# Patient Record
Sex: FEMALE | Race: WHITE | Employment: FULL TIME | URBAN - METROPOLITAN AREA
[De-identification: names, ages, dates, MRNs, and addresses within clinical notes are randomized per-mention and may not be internally consistent; named-entity substitution may affect disease eponyms.]

---

## 2017-01-10 ENCOUNTER — TELEPHONE (OUTPATIENT)
Dept: OBGYN CLINIC | Age: 42
End: 2017-01-10

## 2017-01-10 NOTE — TELEPHONE ENCOUNTER
Patient notified and verbalized understanding    Patient is interested in the LEEP and IUD procedure. She was under the impression it was outpatient. I advised we normally do it in the office. Pt voiced understanding. Will call back once she is bale to schedule.

## 2017-01-13 NOTE — TELEPHONE ENCOUNTER
Patient called back and was advised that LEEP and IUD insertion need to be done in the OR. Patient states she has to check her schedule and will contact the surgery scheduler Ronald Nunez to schedule the surgery.  Patient verbalized understanding

## 2017-01-30 DIAGNOSIS — N93.9 ABNORMAL UTERINE BLEEDING: ICD-10-CM

## 2017-01-30 DIAGNOSIS — N87.1 CERVICAL INTRAEPITHELIAL NEOPLASIA II: Primary | ICD-10-CM

## 2017-02-16 ENCOUNTER — OFFICE VISIT (OUTPATIENT)
Dept: OBGYN CLINIC | Age: 42
End: 2017-02-16

## 2017-02-16 ENCOUNTER — HOSPITAL ENCOUNTER (OUTPATIENT)
Dept: PREADMISSION TESTING | Age: 42
Discharge: HOME | End: 2017-02-16
Payer: COMMERCIAL

## 2017-02-16 VITALS
HEART RATE: 81 BPM | OXYGEN SATURATION: 96 % | BODY MASS INDEX: 43.4 KG/M2 | TEMPERATURE: 97.9 F | WEIGHT: 293 LBS | HEIGHT: 69 IN | RESPIRATION RATE: 18 BRPM | SYSTOLIC BLOOD PRESSURE: 120 MMHG | DIASTOLIC BLOOD PRESSURE: 57 MMHG

## 2017-02-16 VITALS
DIASTOLIC BLOOD PRESSURE: 62 MMHG | SYSTOLIC BLOOD PRESSURE: 120 MMHG | HEIGHT: 69 IN | WEIGHT: 293 LBS | BODY MASS INDEX: 43.4 KG/M2

## 2017-02-16 DIAGNOSIS — N93.9 ABNORMAL UTERINE BLEEDING: ICD-10-CM

## 2017-02-16 DIAGNOSIS — N87.1 CERVICAL INTRAEPITHELIAL NEOPLASIA II: ICD-10-CM

## 2017-02-16 DIAGNOSIS — N93.9 ABNORMAL UTERINE BLEEDING (AUB): ICD-10-CM

## 2017-02-16 DIAGNOSIS — D06.9 SEVERE DYSPLASIA OF CERVIX (CIN III): Primary | ICD-10-CM

## 2017-02-16 LAB
ABO + RH BLD: NORMAL
ANION GAP BLD CALC-SCNC: 8 MMOL/L (ref 5–15)
ATRIAL RATE: 69 BPM
BLOOD GROUP ANTIBODIES SERPL: NORMAL
BUN SERPL-MCNC: 13 MG/DL (ref 6–20)
BUN/CREAT SERPL: 18 (ref 12–20)
CALCIUM SERPL-MCNC: 8.3 MG/DL (ref 8.5–10.1)
CALCULATED P AXIS, ECG09: 37 DEGREES
CALCULATED R AXIS, ECG10: 35 DEGREES
CALCULATED T AXIS, ECG11: 95 DEGREES
CHLORIDE SERPL-SCNC: 107 MMOL/L (ref 97–108)
CO2 SERPL-SCNC: 28 MMOL/L (ref 21–32)
CREAT SERPL-MCNC: 0.72 MG/DL (ref 0.55–1.02)
DIAGNOSIS, 93000: NORMAL
ERYTHROCYTE [DISTWIDTH] IN BLOOD BY AUTOMATED COUNT: 16.1 % (ref 11.5–14.5)
GLUCOSE SERPL-MCNC: 96 MG/DL (ref 65–100)
HCG SERPL QL: NEGATIVE
HCT VFR BLD AUTO: 40.2 % (ref 35–47)
HGB BLD-MCNC: 12.3 G/DL (ref 11.5–16)
MCH RBC QN AUTO: 25.6 PG (ref 26–34)
MCHC RBC AUTO-ENTMCNC: 30.6 G/DL (ref 30–36.5)
MCV RBC AUTO: 83.6 FL (ref 80–99)
P-R INTERVAL, ECG05: 134 MS
PLATELET # BLD AUTO: 247 K/UL (ref 150–400)
POTASSIUM SERPL-SCNC: 4.1 MMOL/L (ref 3.5–5.1)
Q-T INTERVAL, ECG07: 446 MS
QRS DURATION, ECG06: 94 MS
QTC CALCULATION (BEZET), ECG08: 477 MS
RBC # BLD AUTO: 4.81 M/UL (ref 3.8–5.2)
SODIUM SERPL-SCNC: 143 MMOL/L (ref 136–145)
SPECIMEN EXP DATE BLD: NORMAL
VENTRICULAR RATE, ECG03: 69 BPM
WBC # BLD AUTO: 7.5 K/UL (ref 3.6–11)

## 2017-02-16 PROCEDURE — 93005 ELECTROCARDIOGRAM TRACING: CPT

## 2017-02-16 PROCEDURE — 84703 CHORIONIC GONADOTROPIN ASSAY: CPT | Performed by: OBSTETRICS & GYNECOLOGY

## 2017-02-16 PROCEDURE — 86900 BLOOD TYPING SEROLOGIC ABO: CPT | Performed by: OBSTETRICS & GYNECOLOGY

## 2017-02-16 PROCEDURE — 80048 BASIC METABOLIC PNL TOTAL CA: CPT | Performed by: OBSTETRICS & GYNECOLOGY

## 2017-02-16 PROCEDURE — 85027 COMPLETE CBC AUTOMATED: CPT | Performed by: OBSTETRICS & GYNECOLOGY

## 2017-02-16 PROCEDURE — 36415 COLL VENOUS BLD VENIPUNCTURE: CPT | Performed by: OBSTETRICS & GYNECOLOGY

## 2017-02-16 RX ORDER — NAPROXEN SODIUM 220 MG
220 TABLET ORAL AS NEEDED
COMMUNITY
End: 2018-01-24

## 2017-02-16 NOTE — PERIOP NOTES
[facility]  PREOPERATIVE INSTRUCTIONS    Surgery Date: Wednesday, 2/22/17  Surgery arrival time given by surgeon: NO   If no,[facility] [address] staff will call you between 4 PM- 8 PM the day before surgery with your arrival time. Please call [phone] after 8 PM if you did not receive your arrival time. 1. Please report at the designated time to the [address] [place] [address] [facility]. Bring your insurance card, photo identification, and any copayment ( if applicable). 2. You must have a responsible adult to drive you home. You need to have a responsible adult to stay with you the first 24 hours after surgery if you are going home the same day of your surgery and you should not drive a car for 24 hours following your surgery. 3. Nothing to eat or drink after midnight the night before surgery. This includes no water, gum, mints, coffee, juice, etc.  Please note special instructions, if applicable, below for medications. 4. MEDICATIONS TO TAKE THE MORNING OF SURGERY WITH A SIP OF WATER: NONE  5. No alcoholic beverages 24 hours before or after your surgery. 6. If you are being admitted to the hospital,please leave personal belongings/luggage in your car until you have an assigned hospital room number. 7. Stop Aspirin and/or any non-steroidal anti-inflammatory drugs (i.e. Ibuprofen, Naproxen, Advil, Aleve) as directed by your surgeon. You may take Tylenol. Stop herbal supplements 1 week prior to  surgery. 8. If you are currently taking Plavix, Coumadin,or any other blood-thinning/anticoagulant medication contact your surgeon for instructions. 9. Please wear comfortable clothes. Wear your glasses instead of contacts. We ask that all money, jewelry and valuables be left at home. Wear no make up, particularly mascara, the day of surgery. 10.  All body piercings, rings,and jewelry need to be removed and left at home. Please wear your hair loose or down. Please no pony-tails, buns, or any metal hair accessories.  If you shower the morning of surgery, please do not apply any lotions, powders, or deodorants afterwards. Do not shave any body area within 24 hours of your surgery. 11. Please follow all instructions to avoid any potential surgical cancellation. 12.  Should your physical condition change, (i.e. fever, cold, flu, etc.) please notify your surgeon as soon as possible. 13. It is important to be on time. If a situation occurs where you may be delayed, please call:  [phone] / [phone] on the day of surgery. 14. The [facility] staff can be reached at [phone]. .  15. Special instructions: pain scale, free  parking 7-5 pm, BRING medication list with last dose taken    The patient was contacted  in person. She  verbalize  understanding of all instructions does not  need reinforcement.
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
## 2017-02-16 NOTE — PATIENT INSTRUCTIONS
Intrauterine Device (IUD) Insertion: Care Instructions  Your Care Instructions    The intrauterine device (IUD) is a very effective method of birth control. It is a small, plastic, T-shaped device that contains copper or hormones. The doctor inserts the IUD into your uterus. A plastic string tied to the end of the IUD hangs down through the cervix into the vagina. There are two types of IUDs. The copper IUD is effective for up to 10 years. The hormonal IUD is effective for either 3 years or 5 years, depending on which IUD is used. The hormonal IUD also reduces menstrual bleeding and cramping. Both types of IUD damage or kill the man's sperm. This means that the woman's egg does not join with the sperm. IUDs also change the lining of the uterus so that the egg does not lodge there. The IUD is most likely to work well for women who have been pregnant before. Some women who have never been pregnant have more trouble keeping the IUD in the uterus. They also may have more pain and cramping after insertion. Follow-up care is a key part of your treatment and safety. Be sure to make and go to all appointments, and call your doctor if you are having problems. It's also a good idea to know your test results and keep a list of the medicines you take. How can you care for yourself at home? · You may experience some mild cramping and light bleeding (spotting) for 1 or 2 days. Use a hot water bottle or a heating pad set on low on your belly for pain. · Take an over-the-counter pain medicine, such as acetaminophen (Tylenol), ibuprofen (Advil, Motrin), and naproxen (Aleve) if needed. Read and follow all instructions on the label. · Do not take two or more pain medicines at the same time unless the doctor told you to. Many pain medicines have acetaminophen, which is Tylenol. Too much acetaminophen (Tylenol) can be harmful. · Check the string of your IUD after every period.  To do this, insert a finger into your vagina and feel for the cervix, which is at the top of the vagina and feels harder than the rest of your vagina. You should be able to feel the thin, plastic string coming out of the opening of your cervix. If you cannot feel the string, use another form of birth control and make an appointment with your doctor to have the string checked. · If the IUD comes out, save it and call your doctor. Be sure to use another form of birth control while the IUD is out. · Use latex condoms to protect against sexually transmitted infections (STIs), such as gonorrhea and chlamydia. An IUD does not protect you from STIs. Having one sex partner (who does not have STIs and does not have sex with anyone else) is a good way to avoid STIs. When should you call for help? Call 911 anytime you think you may need emergency care. For example, call if:  · You passed out (lost consciousness). · You have sudden, severe pain in your belly or pelvis. Call your doctor now or seek immediate medical care if:  · You have new belly or pelvic pain. · You have severe vaginal bleeding. This means that you are soaking through your usual pads or tampons each hour for 2 or more hours. · You are dizzy or lightheaded, or you feel like you may faint. · You have a fever and pelvic pain or vaginal discharge. · You have pelvic pain that is getting worse. Watch closely for changes in your health, and be sure to contact your doctor if:  · You cannot feel the string, or the IUD comes out. · You feel sick to your stomach, or you vomit. · You think you may be pregnant. Where can you learn more? Go to http://arsh-gosia.info/. Enter W199 in the search box to learn more about \"Intrauterine Device (IUD) Insertion: Care Instructions. \"  Current as of: May 30, 2016  Content Version: 11.1  © 2512-8266 [employer].  Care instructions adapted under license by [employer] (which disclaims liability or warranty for this information). If you have questions about a medical condition or this instruction, always ask your healthcare professional. [address] any warranty or liability for your use of this information. Loop Electrosurgical Excision Procedure (LEEP): Before Your Procedure  What is LEEP? Loop electrosurgical excision procedure (LEEP) removes tissue from the cervix. You may have this done if you've had a Pap test that shows tissue that isn't normal.  During LEEP, your doctor will put a tool called a speculum into your vagina. It gently spreads apart the sides of your vagina. This lets your doctor see the cervix and inside the vagina. A special fluid is sometimes put on your cervix to make certain areas easier to see. You may get a shot of medicine to numb the cervix. You may feel a cramp when you have the shot. You may also get pain medicine. Your doctor will put a device with a fine wire loop into your vagina. The doctor uses the heated wire to cut out tissue. You may have mild cramps for several hours after LEEP. A dark brown discharge during the first week is normal. You may have some spotting for about 3 weeks. You should be able to go back to your normal routine in 1 to 3 days. How long it takes you to recover will depend on how much was done. Follow-up care is a key part of your treatment and safety. Be sure to make and go to all appointments, and call your doctor if you are having problems. It's also a good idea to know your test results and keep a list of the medicines you take. What happens before the procedure? Procedures can be stressful. This information will help you understand what you can expect. And it will help you safely prepare for your procedure. Preparing for the procedure  · Tell your doctor if:  Niru Solorzano are having your menstrual period. ¨ You are or might be pregnant. A blood or urine test may be done to see if you are pregnant.   · Do not douche, use tampons, have sexual intercourse, or use vaginal medicines for 24 hours before the test.  · Understand exactly what procedure is planned, along with the risks, benefits, and other options. · Tell your doctors ALL the medicines, vitamins, supplements, and herbal remedies you take. Some of these can increase the risk of bleeding or interact with anesthesia. · If you take blood thinners, such as warfarin (Coumadin), clopidogrel (Plavix), or aspirin, be sure to talk to your doctor. He or she will tell you if you should stop taking these medicines before your procedure. Make sure that you understand exactly what your doctor wants you to do. · Your doctor will tell you which medicines to take or stop before your procedure. You may need to stop taking certain medicines a week or more before the procedure. So talk to your doctor as soon as you can. What happens on the day of the procedure? · You may eat or drink as you normally do. · Take a bath or shower before you come in for your procedure. Do not apply lotions, perfumes, deodorants, or nail polish. · You may want to take a pain reliever, such as ibuprofen (Advil or Motrin), 30 to 60 minutes before you have the procedure. This can help reduce any cramping pain. · Take off all jewelry and piercings. And take out contact lenses, if you wear them. At the [address] [place] [address] or [facility]  · Bring a picture ID. · You will be kept comfortable and safe by your anesthesia provider. You may get medicine that relaxes you or puts you in a light sleep. The area being worked on will be numb. · The procedure will take about 15 to 30 minutes. Going home  · You will be given more specific instructions about recovering from your procedure. They will cover things like diet, wound care, follow-up care, driving, and getting back to your normal routine. When should you call your doctor? · You have questions or concerns. · You don't understand how to prepare for your procedure.   · You become ill before the procedure (such as fever, flu, or a cold). · You need to reschedule or have changed your mind about having the procedure. Where can you learn more? Go to http://arsh-gosia.info/. Enter U254 in the search box to learn more about \"Loop Electrosurgical Excision Procedure (LEEP): Before Your Procedure. \"  Current as of: July 26, 2016  Content Version: 11.1  © 4112-3976 [employer]. Care instructions adapted under license by [employer] (which disclaims liability or warranty for this information). If you have questions about a medical condition or this instruction, always ask your healthcare professional. [place] [address] any warranty or liability for your use of this information.

## 2017-02-16 NOTE — MR AVS SNAPSHOT
Visit Information Date & Time Provider Department Dept. Phone Encounter #  
 2/16/2017  2:20 PM Zachery S Karsten Guzmán, [facility] [phone] [id number] Your Appointments 2/22/2017 10:00 AM  
PROCEDURE with Zachery [address], MD  
Martin Lopez ([address]) Appt Note: LEEP in OR/Mirena insertion [address] [facility] [address] [facility] [id number]  
[facility] [address] Upcoming Health Maintenance Date Due INFLUENZA AGE 9 TO ADULT 8/1/2016 PAP AKA CERVICAL CYTOLOGY 9/28/2019 Allergies as of 2/16/2017  Review Complete On: 2/16/2017 By: Carlos Nicholas No Known Allergies Current Immunizations  Never Reviewed Name Date  
 TD Vaccine 6/8/2011 10:48 PM  
  
 Not reviewed this visit Vitals BP Height(growth percentile) Weight(growth percentile) LMP BMI OB Status 120/62 5' 9\" (1.753 m) (!) 364 lb (165.1 kg) 02/03/2017 53.75 kg/m2 Having regular periods Smoking Status Never Smoker BMI and BSA Data Body Mass Index Body Surface Area 53.75 kg/m 2 2.84 m 2 Preferred Pharmacy Pharmacy Name Phone [facility] [address] [phone] Your Updated Medication List  
  
   
This list is accurate as of: 2/16/17  2:25 PM.  Always use your most recent med list.  
  
  
  
  
 ALEVE 220 mg tablet Generic drug:  naproxen sodium Take 220 mg by mouth as needed. citalopram 40 mg tablet Commonly known as:  CELEXA  
TAKE ONE TABLET BY MOUTH DAILY  
  
 metFORMIN  mg tablet Commonly known as:  GLUCOPHAGE XR  
TAKE ONE TABLET BY MOUTH DAILY WITH DINNER  
  
 traZODone 100 mg tablet Commonly known as:  DESYREL  
TAKE ONE TABLET BY MOUTH ONCE NIGHTLY Patient Instructions Intrauterine Device (IUD) Insertion: Care Instructions Your Care Instructions The intrauterine device (IUD) is a very effective method of birth control. It is a small, plastic, T-shaped device that contains copper or hormones. The doctor inserts the IUD into your uterus. A plastic string tied to the end of the IUD hangs down through the cervix into the vagina. There are two types of IUDs. The copper IUD is effective for up to 10 years. The hormonal IUD is effective for either 3 years or 5 years, depending on which IUD is used. The hormonal IUD also reduces menstrual bleeding and cramping. Both types of IUD damage or kill the man's sperm. This means that the woman's egg does not join with the sperm. IUDs also change the lining of the uterus so that the egg does not lodge there. The IUD is most likely to work well for women who have been pregnant before. Some women who have never been pregnant have more trouble keeping the IUD in the uterus. They also may have more pain and cramping after insertion. Follow-up care is a key part of your treatment and safety. Be sure to make and go to all appointments, and call your doctor if you are having problems. It's also a good idea to know your test results and keep a list of the medicines you take. How can you care for yourself at home? · You may experience some mild cramping and light bleeding (spotting) for 1 or 2 days. Use a hot water bottle or a heating pad set on low on your belly for pain. · Take an over-the-counter pain medicine, such as acetaminophen (Tylenol), ibuprofen (Advil, Motrin), and naproxen (Aleve) if needed. Read and follow all instructions on the label. · Do not take two or more pain medicines at the same time unless the doctor told you to. Many pain medicines have acetaminophen, which is Tylenol. Too much acetaminophen (Tylenol) can be harmful. · Check the string of your IUD after every period.  To do this, insert a finger into your vagina and feel for the cervix, which is at the top of the vagina and feels harder than the rest of your vagina. You should be able to feel the thin, plastic string coming out of the opening of your cervix. If you cannot feel the string, use another form of birth control and make an appointment with your doctor to have the string checked. · If the IUD comes out, save it and call your doctor. Be sure to use another form of birth control while the IUD is out. · Use latex condoms to protect against sexually transmitted infections (STIs), such as gonorrhea and chlamydia. An IUD does not protect you from STIs. Having one sex partner (who does not have STIs and does not have sex with anyone else) is a good way to avoid STIs. When should you call for help? Call 911 anytime you think you may need emergency care. For example, call if: 
· You passed out (lost consciousness). · You have sudden, severe pain in your belly or pelvis. Call your doctor now or seek immediate medical care if: 
· You have new belly or pelvic pain. · You have severe vaginal bleeding. This means that you are soaking through your usual pads or tampons each hour for 2 or more hours. · You are dizzy or lightheaded, or you feel like you may faint. · You have a fever and pelvic pain or vaginal discharge. · You have pelvic pain that is getting worse. Watch closely for changes in your health, and be sure to contact your doctor if: 
· You cannot feel the string, or the IUD comes out. · You feel sick to your stomach, or you vomit. · You think you may be pregnant. Where can you learn more? Go to http://arsh-gosia.info/. Enter U552 in the search box to learn more about \"Intrauterine Device (IUD) Insertion: Care Instructions. \" Current as of: May 30, 2016 Content Version: 11.1 © 5366-9799 [employer].  Care instructions adapted under license by [employer] (which disclaims liability or warranty for this information). If you have questions about a medical condition or this instruction, always ask your healthcare professional. [address] any warranty or liability for your use of this information. Loop Electrosurgical Excision Procedure (LEEP): Before Your Procedure What is LEEP? Loop electrosurgical excision procedure (LEEP) removes tissue from the cervix. You may have this done if you've had a Pap test that shows tissue that isn't normal. 
During LEEP, your doctor will put a tool called a speculum into your vagina. It gently spreads apart the sides of your vagina. This lets your doctor see the cervix and inside the vagina. A special fluid is sometimes put on your cervix to make certain areas easier to see. You may get a shot of medicine to numb the cervix. You may feel a cramp when you have the shot. You may also get pain medicine. Your doctor will put a device with a fine wire loop into your vagina. The doctor uses the heated wire to cut out tissue. You may have mild cramps for several hours after LEEP. A dark brown discharge during the first week is normal. You may have some spotting for about 3 weeks. You should be able to go back to your normal routine in 1 to 3 days. How long it takes you to recover will depend on how much was done. Follow-up care is a key part of your treatment and safety. Be sure to make and go to all appointments, and call your doctor if you are having problems. It's also a good idea to know your test results and keep a list of the medicines you take. What happens before the procedure? Procedures can be stressful. This information will help you understand what you can expect. And it will help you safely prepare for your procedure. Preparing for the procedure · Tell your doctor if: 
Meg Ware are having your menstrual period. ¨ You are or might be pregnant. A blood or urine test may be done to see if you are pregnant. · Do not douche, use tampons, have sexual intercourse, or use vaginal medicines for 24 hours before the test. 
· Understand exactly what procedure is planned, along with the risks, benefits, and other options. · Tell your doctors ALL the medicines, vitamins, supplements, and herbal remedies you take. Some of these can increase the risk of bleeding or interact with anesthesia. · If you take blood thinners, such as warfarin (Coumadin), clopidogrel (Plavix), or aspirin, be sure to talk to your doctor. He or she will tell you if you should stop taking these medicines before your procedure. Make sure that you understand exactly what your doctor wants you to do. · Your doctor will tell you which medicines to take or stop before your procedure. You may need to stop taking certain medicines a week or more before the procedure. So talk to your doctor as soon as you can. What happens on the day of the procedure? · You may eat or drink as you normally do. · Take a bath or shower before you come in for your procedure. Do not apply lotions, perfumes, deodorants, or nail polish. · You may want to take a pain reliever, such as ibuprofen (Advil or Motrin), 30 to 60 minutes before you have the procedure. This can help reduce any cramping pain. · Take off all jewelry and piercings. And take out contact lenses, if you wear them. At the [address] [place] [address] or hospital 
· Bring a picture ID. · You will be kept comfortable and safe by your anesthesia provider. You may get medicine that relaxes you or puts you in a light sleep. The area being worked on will be numb. · The procedure will take about 15 to 30 minutes. Going home · You will be given more specific instructions about recovering from your procedure. They will cover things like diet, wound care, follow-up care, driving, and getting back to your normal routine. When should you call your doctor? · You have questions or concerns. · You don't understand how to prepare for your procedure. · You become ill before the procedure (such as fever, flu, or a cold). · You need to reschedule or have changed your mind about having the procedure. Where can you learn more? Go to http://arsh-gosia.info/. Enter B197 in the search box to learn more about \"Loop Electrosurgical Excision Procedure (LEEP): Before Your Procedure. \" Current as of: July 26, 2016 Content Version: 11.1 © 1166-7834 [employer]. Care instructions adapted under license by [employer] (which disclaims liability or warranty for this information). If you have questions about a medical condition or this instruction, always ask your healthcare professional. [address] any warranty or liability for your use of this information. Please provide this summary of care documentation to your next provider. Your primary care clinician is listed as Abiel Henry. If you have any questions after today's visit, please call [phone].

## 2017-02-21 ENCOUNTER — ANESTHESIA EVENT (OUTPATIENT)
Dept: SURGERY | Age: 42
End: 2017-02-21
Payer: COMMERCIAL
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
## 2017-02-22 ENCOUNTER — ANESTHESIA (OUTPATIENT)
Dept: SURGERY | Age: 42
End: 2017-02-22
Payer: COMMERCIAL

## 2017-02-22 ENCOUNTER — HOSPITAL ENCOUNTER (OUTPATIENT)
Age: 42
Setting detail: OUTPATIENT SURGERY
Discharge: HOME | End: 2017-02-22
Attending: OBSTETRICS & GYNECOLOGY | Admitting: OBSTETRICS & GYNECOLOGY
Payer: COMMERCIAL

## 2017-02-22 VITALS
HEART RATE: 81 BPM | SYSTOLIC BLOOD PRESSURE: 134 MMHG | OXYGEN SATURATION: 92 % | RESPIRATION RATE: 14 BRPM | TEMPERATURE: 97.5 F | DIASTOLIC BLOOD PRESSURE: 75 MMHG

## 2017-02-22 DIAGNOSIS — N87.1 CERVICAL INTRAEPITHELIAL NEOPLASIA II: ICD-10-CM

## 2017-02-22 DIAGNOSIS — N93.9 ABNORMAL UTERINE BLEEDING: ICD-10-CM

## 2017-02-22 LAB
GLUCOSE BLD STRIP.AUTO-MCNC: 113 MG/DL (ref 65–100)
GLUCOSE BLD STRIP.AUTO-MCNC: 138 MG/DL (ref 65–100)
HCG UR QL: NEGATIVE
SERVICE CMNT-IMP: ABNORMAL
SERVICE CMNT-IMP: ABNORMAL

## 2017-02-22 PROCEDURE — 74011250636 HC RX REV CODE- 250/636: Performed by: ANESTHESIOLOGY

## 2017-02-22 PROCEDURE — 82962 GLUCOSE BLOOD TEST: CPT

## 2017-02-22 PROCEDURE — 77030003666 HC NDL SPINAL BD -A: Performed by: OBSTETRICS & GYNECOLOGY

## 2017-02-22 PROCEDURE — 76060000032 HC ANESTHESIA 0.5 TO 1 HR: Performed by: OBSTETRICS & GYNECOLOGY

## 2017-02-22 PROCEDURE — 77030026438 HC STYL ET INTUB CARD -A: Performed by: ANESTHESIOLOGY

## 2017-02-22 PROCEDURE — 77030011640 HC PAD GRND REM COVD -A: Performed by: OBSTETRICS & GYNECOLOGY

## 2017-02-22 PROCEDURE — 74011250636 HC RX REV CODE- 250/636

## 2017-02-22 PROCEDURE — 88307 TISSUE EXAM BY PATHOLOGIST: CPT | Performed by: OBSTETRICS & GYNECOLOGY

## 2017-02-22 PROCEDURE — 74011000250 HC RX REV CODE- 250

## 2017-02-22 PROCEDURE — 76010000138 HC OR TIME 0.5 TO 1 HR: Performed by: OBSTETRICS & GYNECOLOGY

## 2017-02-22 PROCEDURE — 77030007304 HC ELECTRD LP RND MEGA -A: Performed by: OBSTETRICS & GYNECOLOGY

## 2017-02-22 PROCEDURE — 77030018722 HC ELCTRD BALL LEEP UTMD -B: Performed by: OBSTETRICS & GYNECOLOGY

## 2017-02-22 PROCEDURE — 81025 URINE PREGNANCY TEST: CPT

## 2017-02-22 PROCEDURE — 88305 TISSUE EXAM BY PATHOLOGIST: CPT | Performed by: OBSTETRICS & GYNECOLOGY

## 2017-02-22 PROCEDURE — 76210000016 HC OR PH I REC 1 TO 1.5 HR: Performed by: OBSTETRICS & GYNECOLOGY

## 2017-02-22 PROCEDURE — 77030008684 HC TU ET CUF COVD -B: Performed by: ANESTHESIOLOGY

## 2017-02-22 PROCEDURE — 77030020782 HC GWN BAIR PAWS FLX 3M -B

## 2017-02-22 PROCEDURE — 77030002996 HC SUT SLK J&J -A: Performed by: OBSTETRICS & GYNECOLOGY

## 2017-02-22 PROCEDURE — 74011250637 HC RX REV CODE- 250/637: Performed by: OBSTETRICS & GYNECOLOGY

## 2017-02-22 PROCEDURE — 76210000021 HC REC RM PH II 0.5 TO 1 HR: Performed by: OBSTETRICS & GYNECOLOGY

## 2017-02-22 PROCEDURE — 74011000250 HC RX REV CODE- 250: Performed by: ANESTHESIOLOGY

## 2017-02-22 RX ORDER — ONDANSETRON 2 MG/ML
INJECTION INTRAMUSCULAR; INTRAVENOUS AS NEEDED
Status: DISCONTINUED | OUTPATIENT
Start: 2017-02-22 | End: 2017-02-22 | Stop reason: HOSPADM

## 2017-02-22 RX ORDER — PROPOFOL 10 MG/ML
INJECTION, EMULSION INTRAVENOUS AS NEEDED
Status: DISCONTINUED | OUTPATIENT
Start: 2017-02-22 | End: 2017-02-22 | Stop reason: HOSPADM

## 2017-02-22 RX ORDER — MIDAZOLAM HYDROCHLORIDE 1 MG/ML
INJECTION, SOLUTION INTRAMUSCULAR; INTRAVENOUS AS NEEDED
Status: DISCONTINUED | OUTPATIENT
Start: 2017-02-22 | End: 2017-02-22 | Stop reason: HOSPADM

## 2017-02-22 RX ORDER — SODIUM CHLORIDE 0.9 % (FLUSH) 0.9 %
5-10 SYRINGE (ML) INJECTION AS NEEDED
Status: DISCONTINUED | OUTPATIENT
Start: 2017-02-22 | End: 2017-02-22 | Stop reason: HOSPADM

## 2017-02-22 RX ORDER — LIDOCAINE HYDROCHLORIDE 20 MG/ML
INJECTION, SOLUTION EPIDURAL; INFILTRATION; INTRACAUDAL; PERINEURAL AS NEEDED
Status: DISCONTINUED | OUTPATIENT
Start: 2017-02-22 | End: 2017-02-22 | Stop reason: HOSPADM

## 2017-02-22 RX ORDER — FERRIC SUBSULFATE 20-22G/100
SOLUTION, NON-ORAL MISCELLANEOUS ONCE
Status: DISCONTINUED | OUTPATIENT
Start: 2017-02-22 | End: 2017-02-22 | Stop reason: HOSPADM

## 2017-02-22 RX ORDER — SODIUM CHLORIDE 0.9 % (FLUSH) 0.9 %
5-10 SYRINGE (ML) INJECTION EVERY 8 HOURS
Status: DISCONTINUED | OUTPATIENT
Start: 2017-02-22 | End: 2017-02-22 | Stop reason: HOSPADM

## 2017-02-22 RX ORDER — ACETIC ACID 5 %
30 LIQUID (ML) MISCELLANEOUS ONCE
Status: COMPLETED | OUTPATIENT
Start: 2017-02-22 | End: 2017-02-22

## 2017-02-22 RX ORDER — DOXYCYCLINE HYCLATE 100 MG
100 TABLET ORAL
Status: COMPLETED | OUTPATIENT
Start: 2017-02-22 | End: 2017-02-22

## 2017-02-22 RX ORDER — FENTANYL CITRATE 50 UG/ML
INJECTION, SOLUTION INTRAMUSCULAR; INTRAVENOUS AS NEEDED
Status: DISCONTINUED | OUTPATIENT
Start: 2017-02-22 | End: 2017-02-22 | Stop reason: HOSPADM

## 2017-02-22 RX ORDER — OXYCODONE AND ACETAMINOPHEN 5; 325 MG/1; MG/1
1-2 TABLET ORAL
Qty: 10 TAB | Refills: 0 | Status: SHIPPED | OUTPATIENT
Start: 2017-02-22 | End: 2017-10-05

## 2017-02-22 RX ORDER — SODIUM CHLORIDE, SODIUM LACTATE, POTASSIUM CHLORIDE, CALCIUM CHLORIDE 600; 310; 30; 20 MG/100ML; MG/100ML; MG/100ML; MG/100ML
125 INJECTION, SOLUTION INTRAVENOUS CONTINUOUS
Status: DISCONTINUED | OUTPATIENT
Start: 2017-02-22 | End: 2017-02-22 | Stop reason: HOSPADM

## 2017-02-22 RX ORDER — SODIUM CHLORIDE, SODIUM LACTATE, POTASSIUM CHLORIDE, CALCIUM CHLORIDE 600; 310; 30; 20 MG/100ML; MG/100ML; MG/100ML; MG/100ML
100 INJECTION, SOLUTION INTRAVENOUS CONTINUOUS
Status: DISCONTINUED | OUTPATIENT
Start: 2017-02-22 | End: 2017-02-22 | Stop reason: HOSPADM

## 2017-02-22 RX ORDER — HYDROMORPHONE HYDROCHLORIDE 1 MG/ML
.25-1 INJECTION, SOLUTION INTRAMUSCULAR; INTRAVENOUS; SUBCUTANEOUS
Status: DISCONTINUED | OUTPATIENT
Start: 2017-02-22 | End: 2017-02-22 | Stop reason: HOSPADM

## 2017-02-22 RX ORDER — LIDOCAINE HYDROCHLORIDE 10 MG/ML
0.1 INJECTION, SOLUTION EPIDURAL; INFILTRATION; INTRACAUDAL; PERINEURAL AS NEEDED
Status: DISCONTINUED | OUTPATIENT
Start: 2017-02-22 | End: 2017-02-22 | Stop reason: HOSPADM

## 2017-02-22 RX ORDER — ROCURONIUM BROMIDE 10 MG/ML
INJECTION, SOLUTION INTRAVENOUS AS NEEDED
Status: DISCONTINUED | OUTPATIENT
Start: 2017-02-22 | End: 2017-02-22 | Stop reason: HOSPADM

## 2017-02-22 RX ORDER — DEXAMETHASONE SODIUM PHOSPHATE 4 MG/ML
INJECTION, SOLUTION INTRA-ARTICULAR; INTRALESIONAL; INTRAMUSCULAR; INTRAVENOUS; SOFT TISSUE AS NEEDED
Status: DISCONTINUED | OUTPATIENT
Start: 2017-02-22 | End: 2017-02-22 | Stop reason: HOSPADM

## 2017-02-22 RX ORDER — SUCCINYLCHOLINE CHLORIDE 20 MG/ML
INJECTION INTRAMUSCULAR; INTRAVENOUS AS NEEDED
Status: DISCONTINUED | OUTPATIENT
Start: 2017-02-22 | End: 2017-02-22 | Stop reason: HOSPADM

## 2017-02-22 RX ORDER — KETOROLAC TROMETHAMINE 30 MG/ML
15 INJECTION, SOLUTION INTRAMUSCULAR; INTRAVENOUS
Status: COMPLETED | OUTPATIENT
Start: 2017-02-22 | End: 2017-02-22

## 2017-02-22 RX ORDER — OXYCODONE AND ACETAMINOPHEN 5; 325 MG/1; MG/1
1 TABLET ORAL
Status: DISCONTINUED | OUTPATIENT
Start: 2017-02-22 | End: 2017-02-22 | Stop reason: HOSPADM

## 2017-02-22 RX ORDER — DOXYCYCLINE 100 MG/1
100 CAPSULE ORAL 2 TIMES DAILY
Qty: 10 CAP | Refills: 0 | Status: SHIPPED | OUTPATIENT
Start: 2017-02-22 | End: 2017-02-27

## 2017-02-22 RX ADMIN — DEXAMETHASONE SODIUM PHOSPHATE 8 MG: 4 INJECTION, SOLUTION INTRA-ARTICULAR; INTRALESIONAL; INTRAMUSCULAR; INTRAVENOUS; SOFT TISSUE at 10:13

## 2017-02-22 RX ADMIN — SODIUM CHLORIDE, SODIUM LACTATE, POTASSIUM CHLORIDE, AND CALCIUM CHLORIDE 100 ML/HR: 600; 310; 30; 20 INJECTION, SOLUTION INTRAVENOUS at 08:53

## 2017-02-22 RX ADMIN — PROPOFOL 230 MG: 10 INJECTION, EMULSION INTRAVENOUS at 10:23

## 2017-02-22 RX ADMIN — ROCURONIUM BROMIDE 10 MG: 10 INJECTION, SOLUTION INTRAVENOUS at 10:23

## 2017-02-22 RX ADMIN — ONDANSETRON 4 MG: 2 INJECTION INTRAMUSCULAR; INTRAVENOUS at 10:13

## 2017-02-22 RX ADMIN — MIDAZOLAM HYDROCHLORIDE 2 MG: 1 INJECTION, SOLUTION INTRAMUSCULAR; INTRAVENOUS at 10:13

## 2017-02-22 RX ADMIN — DOXYCYCLINE HYCLATE 100 MG: 100 TABLET, COATED ORAL at 12:52

## 2017-02-22 RX ADMIN — SUCCINYLCHOLINE CHLORIDE 140 MG: 20 INJECTION INTRAMUSCULAR; INTRAVENOUS at 10:23

## 2017-02-22 RX ADMIN — SODIUM CHLORIDE 6.25 MG: 9 INJECTION INTRAMUSCULAR; INTRAVENOUS; SUBCUTANEOUS at 12:15

## 2017-02-22 RX ADMIN — FENTANYL CITRATE 100 MCG: 50 INJECTION, SOLUTION INTRAMUSCULAR; INTRAVENOUS at 10:23

## 2017-02-22 RX ADMIN — KETOROLAC TROMETHAMINE 15 MG: 30 INJECTION, SOLUTION INTRAMUSCULAR at 11:45

## 2017-02-22 RX ADMIN — LIDOCAINE HYDROCHLORIDE 100 MG: 20 INJECTION, SOLUTION EPIDURAL; INFILTRATION; INTRACAUDAL; PERINEURAL at 10:23

## 2017-02-22 NOTE — IP AVS SNAPSHOT
Current Discharge Medication List  
  
Take these medications at their scheduled times Dose & Instructions Dispensing Information Comments Morning Noon Evening Bedtime  
 doxycycline 100 mg capsule Commonly known as:  Merilee Sides Your next dose is: Today, Tomorrow Other:  ____________ Dose:  100 mg Take 1 Cap by mouth two (2) times a day for 5 days. Quantity:  10 Cap Refills:  0 Take these medications as needed Dose & Instructions Dispensing Information Comments Morning Noon Evening Bedtime ALEVE 220 mg tablet Generic drug:  naproxen sodium Your next dose is: Today, Tomorrow Other:  ____________ Dose:  220 mg Take 220 mg by mouth as needed. Refills:  0  
     
   
   
   
  
 oxyCODONE-acetaminophen 5-325 mg per tablet Commonly known as:  PERCOCET Your next dose is: Today, Tomorrow Other:  ____________ Dose:  1-2 Tab Take 1-2 Tabs by mouth every four (4) hours as needed for Pain. Max Daily Amount: 12 Tabs. Quantity:  10 Tab Refills:  0 Take these medications as directed Dose & Instructions Dispensing Information Comments Morning Noon Evening Bedtime  
 citalopram 40 mg tablet Commonly known as:  [place] Your next dose is: Today, Tomorrow Other:  ____________ TAKE ONE TABLET BY MOUTH DAILY Quantity:  30 Tab Refills:  5  
     
   
   
   
  
 metFORMIN  mg tablet Commonly known as:  GLUCOPHAGE XR Your next dose is: Today, Tomorrow Other:  ____________ TAKE ONE TABLET BY MOUTH DAILY WITH DINNER Quantity:  30 Tab Refills:  5  
     
   
   
   
  
 traZODone 100 mg tablet Commonly known as:  Orma Paddy Your next dose is: Today, Tomorrow Other:  ____________ TAKE ONE TABLET BY MOUTH ONCE NIGHTLY Quantity:  30 Tab Refills:  11  
     
   
   
 Where to Get Your Medications These medications were sent to [address], [address] [place] [address] Phone:  [phone]  
  doxycycline 100 mg capsule Information about where to get these medications is not yet available ! Ask your nurse or doctor about these medications  
  oxyCODONE-acetaminophen 5-325 mg per tablet

## 2017-02-22 NOTE — PROGRESS NOTES
S- no new c/o  O - lungs CTAB        CV RRR  A&P - Pt seen and examined. There has been no interval change from H&P. Patient is ready for surgery today as planned.

## 2017-02-22 NOTE — IP AVS SNAPSHOT
Dominguez Arevalo 
 
 
 [address] [facility] [address] [facility] 
[phone] Patient: Alfonso Hoang MRN: [id number] You are allergic to the following No active allergies Recent Documentation OB Status Having regular periods Emergency Contacts Name Discharge Info Relation Home Work Mobile Austen Malloy DISCHARGE CAREGIVER [3] Boyfriend [17] [phone] About your hospitalization You were admitted on:  February 22, 2017 You last received care in the:  [facility] PACU You were discharged on:  February 22, 2017 Unit phone number:  [phone] Why you were hospitalized Your primary diagnosis was:  Not on File Providers Seen During Your Hospitalizations Provider Role Specialty Primary office phone Zachery S Karsten Guzmán MD Attending Provider Obstetrics & Gynecology [phone] Your Primary Care Physician (PCP) Primary Care Physician Office Phone Office Fax 140 Proof [phone] Follow-up Information Follow up With Details Comments Contact Info Molly Ladd MD   [address] [place] [address] [address] [place] [address] 
[phone] Your Appointments Friday March 24, 2017  8:30 AM EDT UL[facility] [address] [place] [address] [address] [place] [address]  
[phone] Friday March 24, 2017  9:00 AM EDT  
ESTABLISHED PATIENT with [address], MD  
[facility] [address] [place] [address] [address] [place] [address]  
[phone] Current Discharge Medication List  
  
START taking these medications Dose & Instructions Dispensing Information Comments Morning Noon Evening Bedtime  
 doxycycline 100 mg capsule Commonly known as:  Maeve Polanco Your next dose is: Today, Tomorrow Other:  _________ Dose:  100 mg Take 1 Cap by mouth two (2) times a day for 5 days. Quantity:  10 Cap Refills:  0  
     
   
   
   
  
 oxyCODONE-acetaminophen 5-325 mg per tablet Commonly known as:  PERCOCET Your next dose is: Today, Tomorrow Other:  _________ Dose:  1-2 Tab Take 1-2 Tabs by mouth every four (4) hours as needed for Pain. Max Daily Amount: 12 Tabs. Quantity:  10 Tab Refills:  0 CONTINUE these medications which have CHANGED Dose & Instructions Dispensing Information Comments Morning Noon Evening Bedtime  
 citalopram 40 mg tablet Commonly known as:  Rashad Ladd What changed:  See the new instructions. Your next dose is: Today, Tomorrow Other:  _________ TAKE ONE TABLET BY MOUTH DAILY Quantity:  30 Tab Refills:  5 CONTINUE these medications which have NOT CHANGED Dose & Instructions Dispensing Information Comments Morning Noon Evening Bedtime ALEVE 220 mg tablet Generic drug:  naproxen sodium Your next dose is: Today, Tomorrow Other:  _________ Dose:  220 mg Take 220 mg by mouth as needed. Refills:  0  
     
   
   
   
  
 metFORMIN  mg tablet Commonly known as:  GLUCOPHAGE XR Your next dose is: Today, Tomorrow Other:  _________ TAKE ONE TABLET BY MOUTH DAILY WITH DINNER Quantity:  30 Tab Refills:  5  
     
   
   
   
  
 traZODone 100 mg tablet Commonly known as:  Saint Rumps Your next dose is: Today, Tomorrow Other:  _________ TAKE ONE TABLET BY MOUTH ONCE NIGHTLY Quantity:  30 Tab Refills:  11 Where to Get Your Medications These medications were sent to [address] [place], [address] [facility] [address] Phone:  [phone]  
  doxycycline 100 mg capsule Information on where to get these meds will be given to you by the nurse or doctor. ! Ask your nurse or doctor about these medications  
  oxyCODONE-acetaminophen 5-325 mg per tablet Discharge Instructions POSTOPERATIVE INSTRUCTION 
FOR CERVICAL LEEP Upon discharge from the hospital, there are a few things to consider: 1. Absolutely no intercourse, douching or tampon use until cleared by your doctor. This precaution helps reduce complication of infection. 2. If you have a vaginal pack in, please remove this the morning after surgery. 3. You may expect some bleeding and bloody vaginal discharge for 2-4 weeks  
following surgery. This will gradually turn yellow. 4. You may return to normal everyday activities in 1-3 days. No strenuous activities, such as high impact exercise or heavy lifting until cleared by your doctor. 5. Notify us if you experience:  
  a.  heavy vaginal bleeding (soaking one pad per hour) b.  temperature of 101°  or greater    
  c.  severe pelvic or vaginal pain 
  d.  foul odor 6. Please call the office today at [phone] to schedule your checkup appointment   
 in 4 weeks. Above all, please notify us of a problem if it arises before we see you again. In an emergency, you may contact a doctor 24 hours a day at [phone]. [address] [facility] [place] [address] Phone [phone] Fax [phone] 
 
     [email or web address] DISCHARGE SUMMARY from your Nurse PATIENT INSTRUCTIONS: 
 
After general anesthesia or intravenous sedation, for 24 hours or while taking prescription Narcotics: · Limit your activities · Do not drive and operate hazardous machinery · Do not make important personal or business decisions · Do  not drink alcoholic beverages · If you have not urinated within 8 hours after discharge, please contact your surgeon on call. Report the following to your surgeon: 
· Excessive pain, swelling, redness or odor of or around the surgical area · Temperature over 100.5 · Nausea and vomiting lasting longer than 4 hours or if unable to take medications · Any signs of decreased circulation or nerve impairment to extremity: change in color, persistent  numbness, tingling, coldness or increase pain · Any questions [address] Breathing deep and coughing are very important exercises to do after surgery. Deep breathing and coughing open the little air tubes and air sacks in your lungs. You take deep breaths every day. You may not even notice - it is just something you do when you sigh or yawn. It is a natural exercise you do to keep these air passages open. After surgery, take deep breaths and cough, on purpose. Coughing and deep breathing help prevent bronchitis and pneumonia after surgery. If you had chest or belly surgery, use a pillow as a \"hug buddy\" and hold it tightly to your chest or belly when you cough. DIRECTIONS: 
6. Take 10 to 15 slow deep breaths every hour while awake. 7. Breathe in deeply, and hold it for 2 seconds. 8. Exhale slowly through puckered lips, like blowing up a balloon. 9. After every 4th or 5th deep breath, hug your pillow to your chest or belly and give a hard, deep cough. Yes, it will probably hurt. But doing this exercise is very important part of healing after surgery. Take your pain medicine to help you do this exercise without too much pain. IF YOU HAVE BEEN DIAGNOSED WITH SLEEP APNEA, PLEASE USE YOUR SLEEIFP APNEA DEVICE OR CPAP MACHINE WHEN YOU INTEND TO NAP AFTER TAKING PAIN MEDICATION. Ankle Pumps Ankle pumps increase the circulation of oxygenated blood to your lower extremities and decrease your risk for circulation problems such as blood clots. They also stretch the muscles, tendons and ligaments in your foot and ankle, and prevent joint contracture in the ankle and foot, especially after surgeries on the legs. It is important to do ankle pump exercises regularly after surgery because immobility increases your risk for developing a blood clot. Your doctor may also have you take an Aspirin for the next few days as well. If your doctor did not ask you to take an Aspirin, consult with him before starting Aspirin therapy on your own. Slowly point your foot forward, feeling the muscles on the top of your lower leg stretch, and hold this position for 5 seconds. Next, pull your foot back toward you as far as possible, stretching the calf muscles, and hold that position for 5 seconds. Repeat with the other foot. Perform 10 repetitions every hour while awake for both ankles if possible (down and then up with the foot once is one repetition). You should feel gentle stretching of the muscles in your lower leg when doing this exercise. If you feel pain, or your range of motion is limited, don't  Push too hard. Only go the limit your joint and muscles will let you go. If you have increasing pain, progressively worsening leg warmth or swelling, STOP the exercise and call your doctor. These are general instructions for a healthy lifestyle: *  Please give a list of your current medications to your Primary Care Provider. *  Please update this list whenever your medications are discontinued, doses are 
    changed, or new medications (including over-the-counter products) are added. *  Please carry medication information at all times in case of emergency situations. About Smoking No smoking / No tobacco products / Avoid exposure to second hand smoke Surgeon General's Warning:  Quitting smoking now greatly reduces serious risk to your health. Obesity, smoking, and sedentary lifestyle greatly increases your risk for illness and disease. A healthy diet, regular physical exercise & weight monitoring are important for maintaining a healthy lifestyle. Congestive Heart Failure You may be retaining fluid if you have a history of heart failure or if you experience any of the following symptoms:  Weight gain of 3 pounds or more overnight or 5 pounds in a week, increased swelling in our hands or feet or shortness of breath while lying flat in bed. Please call your doctor as soon as you notice any of these symptoms; do not wait until your next office visit. Recognize signs and symptoms of STROKE: 
F - face looks uneven A - arms unable to move or move even S - speech slurred or non-existent T - time-call 911 as soon as signs and symptoms begin-DO NOT go Back to bed or wait to see if you get better-TIME IS BRAIN. Warning signs of HEART ATTACK Call 911 if you have these symptoms · Chest discomfort. Most heart attacks involve discomfort in the center of the chest that lasts more than a few minutes, or that goes away and comes back. It can feel like uncomfortable pressure, squeezing, fullness, or pain. · Discomfort in other areas of the upper body. Symptoms can include pain or discomfort in one or both Arms, the back, neck, jaw, or stomach. ·  Shortness of breath with or without chest discomfort. · Other signs may include breaking out in a cold sweat, nausea, or lightheadedness Don't wait more than five minutes to call 911 - MINUTES MATTER! Fast action can save your life. Calling 911 is almost always the fastest way to get lifesaving treatment. Emergency Medical Services staff can begin treatment when they arrive - up to an hour sooner than if someone gets to the hospital by car. ÁNGELA [facility] MEDICATION AND SIDE EFFECT GUIDE The [address] [facility] EFFECT GUIDE was provided to the PATIENT AND CARE PROVIDER. Information provided includes instruction about drug purpose and common side effects for the following medications: · Discharge Orders None General Information Please provide this summary of care documentation to your next provider. Patient Signature:  ____________________________________________________________ Date:  ____________________________________________________________  
  
Hu Naas Provider Signature:  ____________________________________________________________ Date:  ____________________________________________________________

## 2017-02-22 NOTE — ANESTHESIA PREPROCEDURE EVALUATION
Anesthetic History   No history of anesthetic complications  PONV          Review of Systems / Medical History  Patient summary reviewed, nursing notes reviewed and pertinent labs reviewed    Pulmonary  Within defined limits                 Neuro/Psych   Within defined limits           Cardiovascular  Within defined limits          Dysrhythmias : PVC           GI/Hepatic/Renal  Within defined limits   GERD           Endo/Other  Within defined limits      Morbid obesity     Other Findings              Physical Exam    Airway  Mallampati: II  TM Distance: 4 - 6 cm  Neck ROM: normal range of motion   Mouth opening: Normal     Cardiovascular    Rhythm: regular  Rate: normal         Dental  No notable dental hx       Pulmonary  Breath sounds clear to auscultation               Abdominal         Other Findings            Anesthetic Plan    ASA: 2  Anesthesia type: general            Anesthetic plan and risks discussed with: Patient

## 2017-02-22 NOTE — DISCHARGE INSTRUCTIONS
POSTOPERATIVE INSTRUCTION  FOR CERVICAL LEEP    Upon discharge from the hospital, there are a few things to consider:       1. Absolutely no intercourse, douching or tampon use until cleared by your doctor. This precaution helps reduce complication of infection. 2. If you have a vaginal pack in, please remove this the morning after surgery. 3. You may expect some bleeding and bloody vaginal discharge for 2-4 weeks   following surgery. This will gradually turn yellow. 4. You may return to normal everyday activities in 1-3 days. No strenuous activities, such as high impact exercise or heavy lifting until cleared by your doctor. 5. Notify us if you experience:     a.  heavy vaginal bleeding (soaking one pad per hour)    b.  temperature of 101°  or greater       c.  severe pelvic or vaginal pain    d.  foul odor      6. Please call the office today at [phone] to schedule your checkup appointment     in 4 weeks. Above all, please notify us of a problem if it arises before we see you again. In an emergency, you may contact a doctor 24 hours a day at [phone]                    Phone [phone]                        Fax [phone]         [email or web address]         DISCHARGE SUMMARY from your Nurse    PATIENT INSTRUCTIONS:    After general anesthesia or intravenous sedation, for 24 hours or while taking prescription Narcotics:  · Limit your activities  · Do not drive and operate hazardous machinery  · Do not make important personal or business decisions  · Do  not drink alcoholic beverages  · If you have not urinated within 8 hours after discharge, please contact your surgeon on call.     Report the following to your surgeon:  · Excessive pain, swelling, redness or odor of or around the surgical area  · Temperature over 100.5  · Nausea and vomiting lasting longer than 4 hours or if unable to take medications  · Any signs of decreased circulation or nerve impairment to extremity: change in color, persistent  numbness, tingling, coldness or increase pain  · Any questions    COUGH AND DEEP BREATHE    Breathing deep and coughing are very important exercises to do after surgery. Deep breathing and coughing open the little air tubes and air sacks in your lungs. You take deep breaths every day. You may not even notice - it is just something you do when you sigh or yawn. It is a natural exercise you do to keep these air passages open. After surgery, take deep breaths and cough, on purpose. Coughing and deep breathing help prevent bronchitis and pneumonia after surgery. If you had chest or belly surgery, use a pillow as a \"hug julia\" and hold it tightly to your chest or belly when you cough. DIRECTIONS:  6. Take 10 to 15 slow deep breaths every hour while awake. 7. Breathe in deeply, and hold it for 2 seconds. 8. Exhale slowly through puckered lips, like blowing up a balloon. 9. After every 4th or 5th deep breath, hug your pillow to your chest or belly and give a hard, deep cough. Yes, it will probably hurt. But doing this exercise is very important part of healing after surgery. Take your pain medicine to help you do this exercise without too much pain. IF YOU HAVE BEEN DIAGNOSED WITH SLEEP APNEA, PLEASE USE YOUR SLEEIFP APNEA DEVICE OR CPAP MACHINE WHEN YOU INTEND TO NAP AFTER TAKING PAIN MEDICATION. Ankle Pumps    Ankle pumps increase the circulation of oxygenated blood to your lower extremities and decrease your risk for circulation problems such as blood clots. They also stretch the muscles, tendons and ligaments in your foot and ankle, and prevent joint contracture in the ankle and foot, especially after surgeries on the legs.     It is important to do ankle pump exercises regularly after surgery because immobility increases your risk for developing a blood clot. Your doctor may also have you take an Aspirin for the next few days as well. If your doctor did not ask you to take an Aspirin, consult with him before starting Aspirin therapy on your own. Slowly point your foot forward, feeling the muscles on the top of your lower leg stretch, and hold this position for 5 seconds. Next, pull your foot back toward you as far as possible, stretching the calf muscles, and hold that position for 5 seconds. Repeat with the other foot. Perform 10 repetitions every hour while awake for both ankles if possible (down and then up with the foot once is one repetition). You should feel gentle stretching of the muscles in your lower leg when doing this exercise. If you feel pain, or your range of motion is limited, don't  Push too hard. Only go the limit your joint and muscles will let you go. If you have increasing pain, progressively worsening leg warmth or swelling, STOP the exercise and call your doctor. These are general instructions for a healthy lifestyle:    *  Please give a list of your current medications to your Primary Care Provider. *  Please update this list whenever your medications are discontinued, doses are      changed, or new medications (including over-the-counter products) are added. *  Please carry medication information at all times in case of emergency situations. About Smoking  No smoking / No tobacco products / Avoid exposure to second hand smoke    Surgeon General's Warning:  Quitting smoking now greatly reduces serious risk to your health. Obesity, smoking, and sedentary lifestyle greatly increases your risk for illness and disease. A healthy diet, regular physical exercise & weight monitoring are important for maintaining a healthy lifestyle.     Congestive Heart Failure  You may be retaining fluid if you have a history of heart failure or if you experience any of the following symptoms:  Weight gain of 3 pounds or more overnight or 5 pounds in a week, increased swelling in our hands or feet or shortness of breath while lying flat in bed. Please call your doctor as soon as you notice any of these symptoms; do not wait until your next office visit. Recognize signs and symptoms of STROKE:  F - face looks uneven  A - arms unable to move or move even  S - speech slurred or non-existent  T - time-call 911 as soon as signs and symptoms begin-DO NOT go         Back to bed or wait to see if you get better-TIME IS BRAIN. Warning signs of HEART ATTACK  Call 911 if you have these symptoms    · Chest discomfort. Most heart attacks involve discomfort in the center of the chest that lasts more than a few minutes, or that goes away and comes back. It can feel like uncomfortable pressure, squeezing, fullness, or pain. · Discomfort in other areas of the upper body. Symptoms can include pain or discomfort in one or both        Arms, the back, neck, jaw, or stomach. ·  Shortness of breath with or without chest discomfort. · Other signs may include breaking out in a cold sweat, nausea, or lightheadedness    Don't wait more than five minutes to call 911 - MINUTES MATTER! Fast action can save your life. Calling 911 is almost always the fastest way to get lifesaving treatment. Emergency Medical Services staff can begin treatment when they arrive - up to an hour sooner than if someone gets to the hospital by car. ÁNGELA YOO MEDICATION AND SIDE EFFECT GUIDE    The [facility] MEDICATION AND SIDE EFFECT GUIDE was provided to the PATIENT AND CARE PROVIDER.   Information provided includes instruction about drug purpose and common side effects for the following medications:    ·

## 2017-02-22 NOTE — BRIEF OP NOTE
BRIEF OPERATIVE NOTE    Date of Procedure: 2/22/2017   Preoperative Diagnosis: CARMEN II-III, ABNORMAL UTERINE BLEEDING  Postoperative Diagnosis: CARMEN II-III, ABNORMAL UTERINE BLEEDING    Procedure(s):  LOOP ELECTRODE EXCISION PROCEDURE OF CERVIX LEEP, Scottie Lank IUD INSERTION  Surgeon(s) and Role:     * Char Canchola MD - Primary            Surgical Staff:  Circ-1: Vero Saxena RN  Circ-Intern: Katarzyna Ricks RN  Scrub Tech-1: [facility]  Event Time In   Incision Start 1041   Incision Close 1106     Anesthesia: General   Estimated Blood Loss: 50cc  Specimens:   ID Type Source Tests Collected by Time Destination   1 : ECTOCERVIX Preservative Cervix  Char Canchola MD 2/22/2017 1044 Pathology   2 : Barnetta Needles Cervix  Char Canchola MD 2/22/2017 1048 Pathology      Findings: AWE @ 6:00; uterus sounds to 7cm   Complications: none  Implants: * No implants in log *

## 2017-02-22 NOTE — ANESTHESIA POSTPROCEDURE EVALUATION
Post-Anesthesia Evaluation and Assessment    Patient: Jamaal Garcia MRN: [id number]  SSN: [id number]    YOB: 1975  Age: 46 y.o. Sex: female       Cardiovascular Function/Vital Signs  Visit Vitals    /79    Pulse 85    Temp 36.2 °C (97.2 °F)    Resp 18    SpO2 92%       Patient is status post general anesthesia for Procedure(s):  LOOP ELECTRODE EXCISION PROCEDURE OF CERVIX LEEP, MIRENA IUD INSERTION. Nausea/Vomiting: None    Postoperative hydration reviewed and adequate. Pain:  Pain Scale 1: Numeric (0 - 10) (02/22/17 1145)  Pain Intensity 1: 3 (02/22/17 1145)   Managed    Neurological Status:   Neuro (WDL): Within Defined Limits (02/22/17 1120)   At baseline    Mental Status and Level of Consciousness: Arousable    Pulmonary Status:   O2 Device: Nasal cannula (02/22/17 1120)   Adequate oxygenation and airway patent    Complications related to anesthesia: None    Post-anesthesia assessment completed.  No concerns    Signed By: Thomas Coates MD     February 22, 2017

## 2017-02-24 NOTE — OP NOTES
Brennon Alvarado [facility] [address]   [address] [facility], [address]   OP NOTE       Name:  Tessy Griffith   MR#:  [id number]   :  1975   Account #:  [de-identified]    Surgery Date:  2017   Date of Adm:  2017       PREOPERATIVE DIAGNOSES   1. Moderate to severe cervical dysplasia. (CARMEN 2-3). 2. Abnormal uterine bleeding with intermittent oligomenorrhea and   menometrorrhagia, need for endometrial protection. POSTOPERATIVE DIAGNOSES   1. Moderate to severe cervical dysplasia. (CARMEN 2-3). 2. Abnormal uterine bleeding with intermittent oligomenorrhea and   menometrorrhagia, need for endometrial protection. PROCEDURES PERFORMED   1. Loop electrocautery excision procedure. 2. Insertion of a Kyleena intrauterine device. SURGEON: Reena Erickson MD    ANESTHESIA: General endotracheal.    ESTIMATED BLOOD LOSS: 50 mL. FINDINGS: Acetowhite epithelium at 6 o'clock. Uterus sounds to 7 cm. SPECIMENS REMOVED   1. LEEP ectocervical specimen. 2. LEEP endocervical \"hat\". COMPLICATIONS: None. DRAINS: None. COUNTS: Correct x3. DISPOSITION: To recovery room in stable condition. OPERATIVE INDICATIONS: This is a 71-year-old female with a history   of cervical dysplasia. Colposcopy with biopsies revealed CARMEN 2-3 at the   6 o'clock position. LEEP was recommended for treatment. Because of   the patient's habitus decision was made to proceed with this in the   operating room to provide adequate visualization. The patient also has   a history of abnormal uterine bleeding with alternating oligomenorrhea   and menometrorrhagia. She is morbidly obese and would benefit from   endometrial protection with a progestin containing IUD. Description,   risks, benefits, alternatives were discussed with the patient and   informed consent obtained. DESCRIPTION OF PROCEDURE: The patient was identified in the   holding area and again in the operating room.  General endotracheal anesthesia was established. She was placed in the dorsal lithotomy   position in [address] [place] [address] and prepped and draped in the usual sterile   fashion with perineal prep and an in and out catheterization of the   bladder. A surgical time-out was performed. LEEP coated speculum was used to visualize the cervix. Acetic acid   was applied with acetowhite epithelium noted. The LEEP was then   performed using a 20 x 10 mm loop to obtain an ectocervical   specimen, this was tagged at 12 o'clock with a suture. An endocervical   hat was then performed with a 10 x 10 loop. Bovie cautery was used to obtain hemostasis of the LEEP bed. The   cervix was then grasped with a single tooth tenaculum and the uterus   sounded to 7 cm. The Jnaet Shock was placed without difficulty and the   strings trimmed to approximately 4 cm. The tenaculum was removed. Monsel solution was applied to the LEEP bed. All sponge, needle and   instrument counts were correct. The patient tolerated the procedure   well, was awakened in the operating room and went to the recovery   room in stable condition.         MD KOBI Hernandes / OLE   D:  02/23/2017   16:31   T:  02/23/2017   22:08   Job #:  [id number]

## 2017-03-21 ENCOUNTER — OFFICE VISIT (OUTPATIENT)
Dept: OBGYN CLINIC | Age: 42
End: 2017-03-21

## 2017-03-21 VITALS
HEIGHT: 69 IN | SYSTOLIC BLOOD PRESSURE: 116 MMHG | WEIGHT: 293 LBS | BODY MASS INDEX: 43.4 KG/M2 | DIASTOLIC BLOOD PRESSURE: 68 MMHG | HEART RATE: 72 BPM

## 2017-03-21 DIAGNOSIS — Z09 POSTOP CHECK: Primary | ICD-10-CM

## 2017-03-21 DIAGNOSIS — Z30.431 SURVEILLANCE OF (INTRAUTERINE) CONTRACEPTIVE DEVICE: ICD-10-CM

## 2017-03-21 NOTE — PATIENT INSTRUCTIONS
Learning About Birth Control: Intrauterine Device (IUD)  What is an intrauterine device (IUD)? The intrauterine device (IUD) is used to prevent pregnancy. It's a small, plastic, T-shaped device. Your doctor places the IUD in your uterus. You have a choice between a hormonal IUD and a copper IUD. The hormonal IUD prevents pregnancy by damaging or killing sperm. It also releases a type of the hormone progestin. Progestin prevents pregnancy in these ways: It thickens the mucus in the cervix. This makes it hard for sperm to travel into the uterus. It also thins the lining of the uterus, which makes it harder for a fertilized egg to attach to the uterus. Progestin can sometimes stop the ovaries from releasing an egg each month (ovulation). Hormonal IUDs prevent pregnancy for 3 to 5 years, depending on which IUD is used. Once you have it, you don't have to do anything else to prevent pregnancy. The copper IUD is wrapped in copper wire. Copper IUDs prevent pregnancy by making the uterus and fallopian tubes produce a fluid that kills sperm. The copper IUD prevents pregnancy for 10 years. Once you have it, you don't have to do anything else to prevent pregnancy. A string tied to the end of the IUD hangs down through the opening of the uterus (called the cervix) into the vagina. You can check that the IUD is in place by feeling for the string. The IUD usually stays in the uterus until your doctor removes it. How well does it work? In the first year of use:  · When the hormonal IUD is used exactly as directed, fewer than 1 woman out of 100 has an unplanned pregnancy. · When the copper IUD is used exactly as directed, fewer than 1 woman out of 100 has an unplanned pregnancy. Be sure to tell your doctor about any health problems you have or medicines you take. He or she can help you choose the birth control method that is right for you. What are the advantages of an IUD?   · An IUD is one of the most effective methods of birth control. · It prevents pregnancy for 3 to 10 years, depending on the type. You don't have to worry about birth control during this time. · It's safe to use while breastfeeding. · IUDs don't contain estrogen. So you can use an IUD if you don't want to take estrogen or can't take estrogen because you have certain health problems or concerns. · An IUD is convenient. It is always providing birth control. You don't need to remember to take a pill or get a shot. You don't have to interrupt sex to protect against pregnancy. · A hormonal IUD may reduce heavy bleeding and cramping. What are the disadvantages of an IUD? · An IUD doesn't protect against sexually transmitted infections (STIs), such as herpes or HIV/AIDS. If you aren't sure if your sex partner might have an STI, use a condom to protect against disease. · A copper IUD may cause periods with more bleeding and cramping. · You have to see a doctor to have an IUD inserted and removed. · You have to check to see if the string is in place. Where can you learn more? Go to http://arsh-gosia.info/. Enter X978 in the search box to learn more about \"Learning About Birth Control: Intrauterine Device (IUD). \"  Current as of: May 30, 2016  Content Version: 11.1  © 9766-7506 [employer]. Care instructions adapted under license by [employer] (which disclaims liability or warranty for this information). If you have questions about a medical condition or this instruction, always ask your healthcare professional. [place] [address] any warranty or liability for your use of this information.
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
## 2017-08-24 ENCOUNTER — TELEPHONE (OUTPATIENT)
Dept: OBGYN CLINIC | Age: 42
End: 2017-08-24

## 2017-08-24 NOTE — TELEPHONE ENCOUNTER
Patient calling stating that she was seen at [facility] last night for abdominal pain and was advised based on CT scan - 4.5 cm fluid mass around uterus/ovary/pelvis and the IUD was not seen. Patient states she was advised to admitted for surgery. PAtient wanted to know should she stay with [facility] or come to [facility]. Spoke with HW - advised to choose which hospital she would like to go to and follow their direction to be treated. Patient advised on MD recommendation.

## 2017-10-03 ENCOUNTER — TELEPHONE (OUTPATIENT)
Dept: OBGYN CLINIC | Age: 42
End: 2017-10-03

## 2017-10-03 NOTE — TELEPHONE ENCOUNTER
Definitely will want records. If pain/fever is severe then can return to ER. Per telephone call 8/24:  \"Patient calling stating that she was seen at [facility] last night for abdominal pain and was advised based on CT scan - 4.5 cm fluid mass around uterus/ovary/pelvis and the IUD was not seen. Patient states she was advised to admitted for surgery. \"    Did she have surgery? Any other imaging? IUD found? Will probably need US with appt (not sure since no previous records available).

## 2017-10-03 NOTE — TELEPHONE ENCOUNTER
Call received at 12:23PM      38year old patient last seen in the office on 3/21/17 for post op check . Patient calling to say that she was seen in at [facility] back in late August 2017( had CT scan) and then at [facility] that same day for PID. Patient reports she was given 5 different antibiotics for and abcess. Patient calling to say that starting on Jose Ramon 10/1/17 on and off fever, today fever 100.7 and abdominal pain that comes and goes starting today . Patient reports the pain is a 4-5 on the pain scale. Patient reports urinary frequency and feels spasms after urination, denies burning and reports symptoms starting on Monday. This nurse advised patient [facility]          ? Ov ?ultrasound    Please advise

## 2017-10-03 NOTE — TELEPHONE ENCOUNTER
This nurse contacted the patient. Patient states she will get us the records, fax number provided. Patient reports she only had a CT scan and vaginal exam and the IUD was not found. .    Patient did not have surgery was only treated with antibiotics    ?  Shall I scheduled ultrasound with follow up    Please advise

## 2017-10-04 NOTE — TELEPHONE ENCOUNTER
Patient called and stated she had a temperature of 103 and was still in severe pain. Advised patient to go to the ER. Patient stated she could not get up to go to the ER. Advised patient to call 911. Patient verbalized understanding. Advised patient to try to go to [address] [place] [address] so we could see the records in [address] [place] [address]. If she could not go to a [facility] advised patient to get a copy of her notes/records from the ER visit to bring with her. She did not schedule an appt with us at this time.

## 2017-10-04 NOTE — TELEPHONE ENCOUNTER
Patient called back and said her fever has now broke. Advised patient there is no US availability today but we could get her an appt for tomorrow 10/05/17. Advised patient that if her fever was 103 less than a hour ago and still in severe pain she should still go to the ER. Patient verbalized understanding and will go to ER.

## 2017-10-05 ENCOUNTER — APPOINTMENT (OUTPATIENT)
Dept: CT IMAGING | Age: 42
End: 2017-10-05
Attending: EMERGENCY MEDICINE
Payer: COMMERCIAL

## 2017-10-05 ENCOUNTER — HOSPITAL ENCOUNTER (EMERGENCY)
Age: 42
Discharge: HOME | End: 2017-10-05
Attending: EMERGENCY MEDICINE
Payer: COMMERCIAL

## 2017-10-05 ENCOUNTER — APPOINTMENT (OUTPATIENT)
Dept: GENERAL RADIOLOGY | Age: 42
End: 2017-10-05
Attending: EMERGENCY MEDICINE
Payer: COMMERCIAL

## 2017-10-05 VITALS
DIASTOLIC BLOOD PRESSURE: 80 MMHG | TEMPERATURE: 98.6 F | HEIGHT: 69 IN | OXYGEN SATURATION: 93 % | SYSTOLIC BLOOD PRESSURE: 123 MMHG | BODY MASS INDEX: 43.4 KG/M2 | WEIGHT: 293 LBS | RESPIRATION RATE: 19 BRPM | HEART RATE: 120 BPM

## 2017-10-05 DIAGNOSIS — K57.32 DIVERTICULITIS OF LARGE INTESTINE WITHOUT PERFORATION OR ABSCESS WITHOUT BLEEDING: Primary | ICD-10-CM

## 2017-10-05 DIAGNOSIS — N30.00 ACUTE CYSTITIS WITHOUT HEMATURIA: ICD-10-CM

## 2017-10-05 LAB
ALBUMIN SERPL-MCNC: 3.5 G/DL (ref 3.5–5)
ALBUMIN/GLOB SERPL: 0.8 {RATIO} (ref 1.1–2.2)
ALP SERPL-CCNC: 85 U/L (ref 45–117)
ALT SERPL-CCNC: 32 U/L (ref 12–78)
ANION GAP SERPL CALC-SCNC: 10 MMOL/L (ref 5–15)
APPEARANCE UR: CLEAR
AST SERPL-CCNC: 25 U/L (ref 15–37)
BACTERIA URNS QL MICRO: ABNORMAL /HPF
BASOPHILS # BLD: 0 K/UL
BASOPHILS NFR BLD: 0 %
BILIRUB SERPL-MCNC: 1 MG/DL (ref 0.2–1)
BILIRUB UR QL: NEGATIVE
BUN SERPL-MCNC: 14 MG/DL (ref 6–20)
BUN/CREAT SERPL: 13 (ref 12–20)
CALCIUM SERPL-MCNC: 8.8 MG/DL (ref 8.5–10.1)
CHLORIDE SERPL-SCNC: 102 MMOL/L (ref 97–108)
CO2 SERPL-SCNC: 26 MMOL/L (ref 21–32)
COLOR UR: ABNORMAL
CREAT SERPL-MCNC: 1.07 MG/DL (ref 0.55–1.02)
DIFFERENTIAL METHOD BLD: ABNORMAL
EOSINOPHIL # BLD: 0.2 K/UL
EOSINOPHIL NFR BLD: 1 %
EPITH CASTS URNS QL MICRO: ABNORMAL /LPF
ERYTHROCYTE [DISTWIDTH] IN BLOOD BY AUTOMATED COUNT: 14.8 % (ref 11.5–14.5)
GLOBULIN SER CALC-MCNC: 4.4 G/DL (ref 2–4)
GLUCOSE SERPL-MCNC: 154 MG/DL (ref 65–100)
GLUCOSE UR STRIP.AUTO-MCNC: NEGATIVE MG/DL
HCG SERPL QL: NEGATIVE
HCT VFR BLD AUTO: 40.8 % (ref 35–47)
HGB BLD-MCNC: 13.3 G/DL (ref 11.5–16)
HGB UR QL STRIP: ABNORMAL
KETONES UR QL STRIP.AUTO: NEGATIVE MG/DL
LACTATE SERPL-SCNC: 1.8 MMOL/L (ref 0.4–2)
LEUKOCYTE ESTERASE UR QL STRIP.AUTO: ABNORMAL
LYMPHOCYTES # BLD: 0.9 K/UL
LYMPHOCYTES NFR BLD: 6 %
MCH RBC QN AUTO: 27.3 PG (ref 26–34)
MCHC RBC AUTO-ENTMCNC: 32.6 G/DL (ref 30–36.5)
MCV RBC AUTO: 83.8 FL (ref 80–99)
MONOCYTES # BLD: 0.9 K/UL
MONOCYTES NFR BLD: 6 %
MUCOUS THREADS URNS QL MICRO: ABNORMAL /LPF
NEUTS SEG # BLD: 13.5 K/UL
NEUTS SEG NFR BLD: 87 %
NITRITE UR QL STRIP.AUTO: POSITIVE
PH UR STRIP: 5.5 [PH] (ref 5–8)
PLATELET # BLD AUTO: 220 K/UL (ref 150–400)
POTASSIUM SERPL-SCNC: 3.5 MMOL/L (ref 3.5–5.1)
PROT SERPL-MCNC: 7.9 G/DL (ref 6.4–8.2)
PROT UR STRIP-MCNC: 30 MG/DL
RBC # BLD AUTO: 4.87 M/UL (ref 3.8–5.2)
RBC #/AREA URNS HPF: ABNORMAL /HPF (ref 0–5)
RBC MORPH BLD: ABNORMAL
SODIUM SERPL-SCNC: 138 MMOL/L (ref 136–145)
SP GR UR REFRACTOMETRY: 1.01 (ref 1–1.03)
UROBILINOGEN UR QL STRIP.AUTO: 1 EU/DL (ref 0.2–1)
WBC # BLD AUTO: 15.5 K/UL (ref 3.6–11)
WBC URNS QL MICRO: ABNORMAL /HPF (ref 0–4)

## 2017-10-05 PROCEDURE — 85025 COMPLETE CBC W/AUTO DIFF WBC: CPT | Performed by: EMERGENCY MEDICINE

## 2017-10-05 PROCEDURE — 99285 EMERGENCY DEPT VISIT HI MDM: CPT

## 2017-10-05 PROCEDURE — 36415 COLL VENOUS BLD VENIPUNCTURE: CPT | Performed by: EMERGENCY MEDICINE

## 2017-10-05 PROCEDURE — 71020 XR CHEST PA LAT: CPT

## 2017-10-05 PROCEDURE — 80053 COMPREHEN METABOLIC PANEL: CPT | Performed by: EMERGENCY MEDICINE

## 2017-10-05 PROCEDURE — 74011636320 HC RX REV CODE- 636/320: Performed by: EMERGENCY MEDICINE

## 2017-10-05 PROCEDURE — 74011250636 HC RX REV CODE- 250/636: Performed by: EMERGENCY MEDICINE

## 2017-10-05 PROCEDURE — 81001 URINALYSIS AUTO W/SCOPE: CPT | Performed by: EMERGENCY MEDICINE

## 2017-10-05 PROCEDURE — 83605 ASSAY OF LACTIC ACID: CPT | Performed by: EMERGENCY MEDICINE

## 2017-10-05 PROCEDURE — 87040 BLOOD CULTURE FOR BACTERIA: CPT | Performed by: EMERGENCY MEDICINE

## 2017-10-05 PROCEDURE — 96374 THER/PROPH/DIAG INJ IV PUSH: CPT

## 2017-10-05 PROCEDURE — 84703 CHORIONIC GONADOTROPIN ASSAY: CPT | Performed by: EMERGENCY MEDICINE

## 2017-10-05 PROCEDURE — 74011250637 HC RX REV CODE- 250/637: Performed by: EMERGENCY MEDICINE

## 2017-10-05 PROCEDURE — 74177 CT ABD & PELVIS W/CONTRAST: CPT

## 2017-10-05 PROCEDURE — 87186 SC STD MICRODIL/AGAR DIL: CPT | Performed by: EMERGENCY MEDICINE

## 2017-10-05 PROCEDURE — 87077 CULTURE AEROBIC IDENTIFY: CPT | Performed by: EMERGENCY MEDICINE

## 2017-10-05 RX ORDER — SODIUM CHLORIDE 0.9 % (FLUSH) 0.9 %
10 SYRINGE (ML) INJECTION
Status: COMPLETED | OUTPATIENT
Start: 2017-10-05 | End: 2017-10-05

## 2017-10-05 RX ORDER — METRONIDAZOLE 500 MG/1
500 TABLET ORAL 2 TIMES DAILY
Qty: 14 TAB | Refills: 0 | Status: SHIPPED | OUTPATIENT
Start: 2017-10-05 | End: 2017-10-12

## 2017-10-05 RX ORDER — ONDANSETRON 2 MG/ML
4 INJECTION INTRAMUSCULAR; INTRAVENOUS
Status: COMPLETED | OUTPATIENT
Start: 2017-10-05 | End: 2017-10-05

## 2017-10-05 RX ORDER — METRONIDAZOLE 250 MG/1
500 TABLET ORAL
Status: COMPLETED | OUTPATIENT
Start: 2017-10-05 | End: 2017-10-05

## 2017-10-05 RX ORDER — SODIUM CHLORIDE 9 MG/ML
50 INJECTION, SOLUTION INTRAVENOUS
Status: COMPLETED | OUTPATIENT
Start: 2017-10-05 | End: 2017-10-05

## 2017-10-05 RX ORDER — SODIUM CHLORIDE 0.9 % (FLUSH) 0.9 %
5-10 SYRINGE (ML) INJECTION AS NEEDED
Status: DISCONTINUED | OUTPATIENT
Start: 2017-10-05 | End: 2017-10-05 | Stop reason: HOSPADM

## 2017-10-05 RX ORDER — OXYCODONE AND ACETAMINOPHEN 5; 325 MG/1; MG/1
1 TABLET ORAL
Qty: 20 TAB | Refills: 0 | Status: SHIPPED | OUTPATIENT
Start: 2017-10-05 | End: 2018-01-24

## 2017-10-05 RX ORDER — CIPROFLOXACIN 500 MG/1
500 TABLET ORAL 2 TIMES DAILY
Qty: 20 TAB | Refills: 0 | Status: SHIPPED | OUTPATIENT
Start: 2017-10-05 | End: 2017-10-15

## 2017-10-05 RX ORDER — CIPROFLOXACIN 500 MG/1
500 TABLET ORAL
Status: COMPLETED | OUTPATIENT
Start: 2017-10-05 | End: 2017-10-05

## 2017-10-05 RX ADMIN — Medication 10 ML: at 09:08

## 2017-10-05 RX ADMIN — SODIUM CHLORIDE 50 ML/HR: 900 INJECTION, SOLUTION INTRAVENOUS at 07:25

## 2017-10-05 RX ADMIN — CIPROFLOXACIN HYDROCHLORIDE 500 MG: 500 TABLET, FILM COATED ORAL at 11:21

## 2017-10-05 RX ADMIN — METRONIDAZOLE 500 MG: 250 TABLET ORAL at 11:21

## 2017-10-05 RX ADMIN — Medication 10 ML: at 07:29

## 2017-10-05 RX ADMIN — ONDANSETRON 4 MG: 2 INJECTION INTRAMUSCULAR; INTRAVENOUS at 07:30

## 2017-10-05 RX ADMIN — IOPAMIDOL 100 ML: 755 INJECTION, SOLUTION INTRAVENOUS at 09:08

## 2017-10-05 NOTE — ED NOTES
Dr. Beto Bro reviewed discharge instructions with the patient. The patient verbalized understanding. Patient ready for discharge. Waiting for her ride.

## 2017-10-05 NOTE — ED NOTES
Pt will not allow staff to attempt IV access in certain places.   Pt states that she has been stuck 15 times in the past.

## 2017-10-05 NOTE — DISCHARGE INSTRUCTIONS
Diverticulitis: Care Instructions  Your Care Instructions    Diverticulitis occurs when pouches form in the wall of the colon and become inflamed or infected. It can be very painful. Doctors aren't sure what causes diverticulitis. There is no proof that foods such as nuts, seeds, or berries cause it or make it worse. A low-fiber diet may cause the colon to work harder to push stool forward. Pouches may form because of this extra work. It may be hard to think about healthy eating while you're in pain. But as you recover, you might think about how you can use healthy eating for overall better health. Healthy eating may help you avoid future attacks. Follow-up care is a key part of your treatment and safety. Be sure to make and go to all appointments, and call your doctor if you are having problems. It's also a good idea to know your test results and keep a list of the medicines you take. How can you care for yourself at home? · Drink plenty of fluids, enough so that your urine is light yellow or clear like water. If you have kidney, heart, or liver disease and have to limit fluids, talk with your doctor before you increase the amount of fluids you drink. · Stick to liquids or a bland diet (plain rice, bananas, dry toast or crackers, applesauce) until you are feeling better. Then you can return to regular foods and gradually increase the amount of fiber in your diet. · Use a heating pad set on low on your belly to relieve mild cramps and pain. · Get extra rest until you are feeling better. · Be safe with medicines. Read and follow all instructions on the label. ¨ If the doctor gave you a prescription medicine for pain, take it as prescribed. ¨ If you are not taking a prescription pain medicine, ask your doctor if you can take an over-the-counter medicine. · If your doctor prescribed antibiotics, take them as directed. Do not stop taking them just because you feel better.  You need to take the full course of antibiotics. To prevent future attacks of diverticulitis  · Avoid constipation:  ¨ Include fruits, vegetables, beans, and whole grains in your diet each day. These foods are high in fiber. ¨ Drink plenty of fluids, enough so that your urine is light yellow or clear like water. If you have kidney, heart, or liver disease and have to limit fluids, talk with your doctor before you increase the amount of fluids you drink. ¨ Get some exercise every day. Build up slowly to 30 to 60 minutes a day on 5 or more days of the week. ¨ Take a fiber supplement, such as Citrucel or Metamucil, every day if needed. Read and follow all instructions on the label. ¨ Schedule time each day for a bowel movement. Having a daily routine may help. Take your time and do not strain when having a bowel movement. When should you call for help? Call 911 anytime you think you may need emergency care. For example, call if:  · You passed out (lost consciousness). · You vomit blood or what looks like coffee grounds. · You pass maroon or very bloody stools. Call your doctor now or seek immediate medical care if:  · You have severe pain or swelling in your belly. · You have a new or higher fever. · You cannot keep down fluids or medicines. · You have new pain that gets worse when you move or cough. Watch closely for changes in your health, and be sure to contact your doctor if:  · The symptoms you had when you first started feeling sick come back. · You do not get better as expected. Where can you learn more? Go to http://arsh-gosia.info/. Enter H901 in the search box to learn more about \"Diverticulitis: Care Instructions. \"  Current as of: August 9, 2016  Content Version: 11.3  © 8808-3380 [employer]. Care instructions adapted under license by [employer] (which disclaims liability or warranty for this information).  If you have questions about a medical condition or this instruction, always ask your healthcare professional. [place] [address] any warranty or liability for your use of this information. Urinary Tract Infection in Women: Care Instructions  Your Care Instructions    A urinary tract infection, or UTI, is a general term for an infection anywhere between the kidneys and the urethra (where urine comes out). Most UTIs are bladder infections. They often cause pain or burning when you urinate. UTIs are caused by bacteria and can be cured with antibiotics. Be sure to complete your treatment so that the infection goes away. Follow-up care is a key part of your treatment and safety. Be sure to make and go to all appointments, and call your doctor if you are having problems. It's also a good idea to know your test results and keep a list of the medicines you take. How can you care for yourself at home? · Take your antibiotics as directed. Do not stop taking them just because you feel better. You need to take the full course of antibiotics. · Drink extra water and other fluids for the next day or two. This may help wash out the bacteria that are causing the infection. (If you have kidney, heart, or liver disease and have to limit fluids, talk with your doctor before you increase your fluid intake.)  · Avoid drinks that are carbonated or have caffeine. They can irritate the bladder. · Urinate often. Try to empty your bladder each time. · To relieve pain, take a hot bath or lay a heating pad set on low over your lower belly or genital area. Never go to sleep with a heating pad in place. To prevent UTIs  · Drink plenty of water each day. This helps you urinate often, which clears bacteria from your system. (If you have kidney, heart, or liver disease and have to limit fluids, talk with your doctor before you increase your fluid intake.)  · Urinate when you need to. · Urinate right after you have sex. · Change sanitary pads often.   · Avoid douches, bubble baths, feminine hygiene sprays, and other feminine hygiene products that have deodorants. · After going to the bathroom, wipe from front to back. When should you call for help? Call your doctor now or seek immediate medical care if:  · Symptoms such as fever, chills, nausea, or vomiting get worse or appear for the first time. · You have new pain in your back just below your rib cage. This is called flank pain. · There is new blood or pus in your urine. · You have any problems with your antibiotic medicine. Watch closely for changes in your health, and be sure to contact your doctor if:  · You are not getting better after taking an antibiotic for 2 days. · Your symptoms go away but then come back. Where can you learn more? Go to http://arsh-gosia.info/. Enter O728 in the search box to learn more about \"Urinary Tract Infection in Women: Care Instructions. \"  Current as of: November 28, 2016  Content Version: 11.3  © 5864-2955 [employer]. Care instructions adapted under license by [employer] (which disclaims liability or warranty for this information). If you have questions about a medical condition or this instruction, always ask your healthcare professional. [place] [address] any warranty or liability for your use of this information. To whom it may concern: This letter is to certify that Pratik Brown was seen, evaluated and   treated in the Emergency Department on 10/5/2017 at [place] [address]..     Thank you,    Padmini Fagan MD    [place] [address].  [address] [place] [address], [address] [place] [address]    [phone]
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
## 2017-10-05 NOTE — ED NOTES
Bedside shift change report given to me (oncoming nurse) by Kelly Blackburn (offgoing nurse). Report included the following information SBAR, ED Summary, Procedure Summary, MAR and Recent Results.

## 2017-10-06 NOTE — PROGRESS NOTES
Pt discharged on Cipro and Flagyl. Only growing in 1 bottle so far.   Final results and sensitivities pending

## 2017-10-11 LAB
BACTERIA SPEC CULT: ABNORMAL
BACTERIA SPEC CULT: ABNORMAL
BACTERIA SPEC CULT: NORMAL
SERVICE CMNT-IMP: ABNORMAL
SERVICE CMNT-IMP: NORMAL

## 2017-11-17 LAB
Lab: NORMAL
REFERENCE LAB,REFLB: NORMAL
TEST DESCRIPTION:,ATST: NORMAL

## 2017-11-30 ENCOUNTER — OFFICE VISIT (OUTPATIENT)
Dept: OBGYN CLINIC | Age: 42
End: 2017-11-30

## 2017-11-30 VITALS — BODY MASS INDEX: 43.4 KG/M2 | WEIGHT: 293 LBS | HEIGHT: 69 IN

## 2017-11-30 DIAGNOSIS — Z87.42 HISTORY OF PID: ICD-10-CM

## 2017-11-30 DIAGNOSIS — N93.9 ABNORMAL UTERINE BLEEDING (AUB): Primary | ICD-10-CM

## 2017-11-30 DIAGNOSIS — D06.9 SEVERE DYSPLASIA OF CERVIX (CIN III): ICD-10-CM

## 2017-11-30 DIAGNOSIS — Z87.19 HISTORY OF DIVERTICULITIS: ICD-10-CM

## 2017-11-30 RX ORDER — MEDROXYPROGESTERONE ACETATE 10 MG/1
TABLET ORAL
Qty: 30 TAB | Refills: 0 | Status: SHIPPED | OUTPATIENT
Start: 2017-11-30 | End: 2018-01-11 | Stop reason: SDUPTHER
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
## 2017-11-30 NOTE — PATIENT INSTRUCTIONS
Abnormal Uterine Bleeding: Care Instructions  Your Care Instructions    Abnormal uterine bleeding (AUB) is irregular bleeding from the uterus that is longer or heavier than usual or does not occur at your regular time. Sometimes it is caused by changes in hormone levels. It can also be caused by growths in the uterus, such as fibroids or polyps. Sometimes a cause cannot be found. You may have heavy bleeding when you are not expecting your period. Your doctor may suggest a pregnancy test, if you think you are pregnant. Follow-up care is a key part of your treatment and safety. Be sure to make and go to all appointments, and call your doctor if you are having problems. It's also a good idea to know your test results and keep a list of the medicines you take. How can you care for yourself at home? · Be safe with medicines. Take pain medicines exactly as directed. ¨ If the doctor gave you a prescription medicine for pain, take it as prescribed. ¨ If you are not taking a prescription pain medicine, ask your doctor if you can take an over-the-counter medicine. · You may be low in iron because of blood loss. Eat a balanced diet that is high in iron and vitamin C. Foods rich in iron include red meat, shellfish, eggs, beans, and leafy green vegetables. Talk to your doctor about whether you need to take iron pills or a multivitamin. When should you call for help? Call 911 anytime you think you may need emergency care. For example, call if:  ? · You passed out (lost consciousness). ?Call your doctor now or seek immediate medical care if:  ? · You have new or worse belly or pelvic pain. ? · You have severe vaginal bleeding. ? · You feel dizzy or lightheaded, or you feel like you may faint. ? Watch closely for changes in your health, and be sure to contact your doctor if:  ? · You think you may be pregnant. ? · Your bleeding gets worse. ? · You do not get better as expected.    Where can you learn more?  Go to http://arsh-gosia.info/. Enter O060 in the search box to learn more about \"Abnormal Uterine Bleeding: Care Instructions. \"  Current as of: October 13, 2016  Content Version: 11.4  © 0688-2620 [employer]. Care instructions adapted under license by [employer] (which disclaims liability or warranty for this information). If you have questions about a medical condition or this instruction, always ask your healthcare professional. [place] [address] any warranty or liability for your use of this information.

## 2017-11-30 NOTE — MR AVS SNAPSHOT
Visit Information Date & Time Provider Department Dept. Phone Encounter #  
 11/30/2017  9:40 AM [phone] [id number] Upcoming Health Maintenance Date Due Influenza Age 5 to Adult 8/1/2017 PAP AKA CERVICAL CYTOLOGY 9/28/2019 Allergies as of 11/30/2017  Review Complete On: 11/30/2017 By: Shelley Jones No Known Allergies Current Immunizations  Never Reviewed Name Date  
 TD Vaccine 6/8/2011 10:48 PM  
  
 Not reviewed this visit Vitals Height(growth percentile) Weight(growth percentile) BMI OB Status Smoking Status 5' 9\" (1.753 m) (!) 365 lb (165.6 kg) 53.9 kg/m2 Unknown Never Smoker Vitals History BMI and BSA Data Body Mass Index Body Surface Area 53.9 kg/m 2 2.84 m 2 Preferred Pharmacy Pharmacy Name Phone [facility], [address] [place] [address]. [phone] Your Updated Medication List  
  
   
This list is accurate as of: 11/30/17 10:08 AM.  Always use your most recent med list.  
  
  
  
  
 ALEVE 220 mg tablet Generic drug:  naproxen sodium Take 220 mg by mouth as needed. citalopram 40 mg tablet Commonly known as:  CELEXA  
TAKE ONE TABLET BY MOUTH DAILY  
  
 KYLEENA 17.5 mcg/24 hr (5 years) Iud IUD Generic drug:  levonorgestrel  
by IntraUTERine route. metFORMIN  mg tablet Commonly known as:  GLUCOPHAGE XR  
TAKE ONE TABLET BY MOUTH DAILY WITH DINNER  
  
 oxyCODONE-acetaminophen 5-325 mg per tablet Commonly known as:  PERCOCET Take 1 Tab by mouth every four (4) hours as needed for Pain for up to 10 doses. Max Daily Amount: 6 Tabs. traZODone 100 mg tablet Commonly known as:  DESYREL  
TAKE ONE TABLET BY MOUTH ONCE NIGHTLY Patient Instructions Abnormal Uterine Bleeding: Care Instructions Your Care Instructions Abnormal uterine bleeding (AUB) is irregular bleeding from the uterus that is longer or heavier than usual or does not occur at your regular time. Sometimes it is caused by changes in hormone levels. It can also be caused by growths in the uterus, such as fibroids or polyps. Sometimes a cause cannot be found. You may have heavy bleeding when you are not expecting your period. Your doctor may suggest a pregnancy test, if you think you are pregnant. Follow-up care is a key part of your treatment and safety. Be sure to make and go to all appointments, and call your doctor if you are having problems. It's also a good idea to know your test results and keep a list of the medicines you take. How can you care for yourself at home? · Be safe with medicines. Take pain medicines exactly as directed. ¨ If the doctor gave you a prescription medicine for pain, take it as prescribed. ¨ If you are not taking a prescription pain medicine, ask your doctor if you can take an over-the-counter medicine. · You may be low in iron because of blood loss. Eat a balanced diet that is high in iron and vitamin C. Foods rich in iron include red meat, shellfish, eggs, beans, and leafy green vegetables. Talk to your doctor about whether you need to take iron pills or a multivitamin. When should you call for help? Call 911 anytime you think you may need emergency care. For example, call if: 
? · You passed out (lost consciousness). ?Call your doctor now or seek immediate medical care if: 
? · You have new or worse belly or pelvic pain. ? · You have severe vaginal bleeding. ? · You feel dizzy or lightheaded, or you feel like you may faint. ? Watch closely for changes in your health, and be sure to contact your doctor if: 
? · You think you may be pregnant. ? · Your bleeding gets worse. ? · You do not get better as expected. Where can you learn more? Go to http://arsh-gosia.info/. Enter Y748 in the search box to learn more about \"Abnormal Uterine Bleeding: Care Instructions. \" Current as of: October 13, 2016 Content Version: 11.4 © 0278-2093 [employer]. Care instructions adapted under license by [employer] (which disclaims liability or warranty for this information). If you have questions about a medical condition or this instruction, always ask your healthcare professional. [address] any warranty or liability for your use of this information. Introducing [facility]! Dong Esquivel introduces Faraday patient portal. Now you can access parts of your medical record, email your doctor's office, and request medication refills online. 1. In your internet browser, go to [email or web address] 2. Click on the First Time User? Click Here link in the Sign In box. You will see the New Member Sign Up page. 3. Enter your Faraday Access Code exactly as it appears below. You will not need to use this code after youve completed the sign-up process. If you do not sign up before the expiration date, you must request a new code. · Faraday Access Code: [email or web address] Expires: 2/3/2018 12:30 PM 
 
4. Enter the last four digits of your Social Security Number (xxxx) and Date of Birth (mm/dd/yyyy) as indicated and click Submit. You will be taken to the next sign-up page. 5. Create a Faraday ID. This will be your Faraday login ID and cannot be changed, so think of one that is secure and easy to remember. 6. Create a Faraday password. You can change your password at any time. 7. Enter your Password Reset Question and Answer. This can be used at a later time if you forget your password. 8. Enter your e-mail address. You will receive e-mail notification when new information is available in [address] 9. Click Sign Up. You can now view and download portions of your medical record. 10. Click the Download Summary menu link to download a portable copy of your medical information. If you have questions, please visit the Frequently Asked Questions section of the [employer] website. Remember, [employer] is NOT to be used for urgent needs. For medical emergencies, dial 911. Now available from your iPhone and Android! Please provide this summary of care documentation to your next provider. Your primary care clinician is listed as Donnie Yoder. If you have any questions after today's visit, please call [phone].

## 2017-12-20 ENCOUNTER — TELEPHONE (OUTPATIENT)
Dept: OBGYN CLINIC | Age: 42
End: 2017-12-20

## 2017-12-20 RX ORDER — MEDROXYPROGESTERONE ACETATE 10 MG/1
10 TABLET ORAL DAILY
Qty: 20 TAB | Refills: 0 | Status: SHIPPED | OUTPATIENT
Start: 2017-12-20 | End: 2018-01-09

## 2017-12-20 NOTE — TELEPHONE ENCOUNTER
Patient called stating that she started the Provera 10mg on December 1st, took it for 10 days as directed as patient was having vaginal bleeding when she started the provera and now reports that the bleeding has gotten worse. She states that this past Sunday, she went from a spotting/light flow to heavy flow changing her super tampon q 2hours with possible clotting or tissue as she has not inspected the tissue that was passed. Patient is concerned and wanted to know what to do next since her bleeding has not stopped. Patient was instructed to contact the office if the bleeding did not stop or increased. Please advise.

## 2017-12-20 NOTE — TELEPHONE ENCOUNTER
This is the withdrawal bleed from the Provera that she just took. (will start anywhere from a couple of days to a couple of weeks after finishing). This episode will be heavier because she hasn't been cycling regularly. I would expect it to last like a normal period, 5-7 days. If heavier bleeding is continuing, then can try a longer course of Provera. OK to send in [facility] for Provera 10mg daily x20d so that she has that available to her over the holiday weekend.

## 2017-12-20 NOTE — TELEPHONE ENCOUNTER
Patient aware of MD recommendations and aware that rx was sent to pharmacy in case she needs the medication.

## 2018-01-11 ENCOUNTER — OFFICE VISIT (OUTPATIENT)
Dept: OBGYN CLINIC | Age: 43
End: 2018-01-11

## 2018-01-11 VITALS
SYSTOLIC BLOOD PRESSURE: 118 MMHG | WEIGHT: 293 LBS | HEIGHT: 69 IN | HEART RATE: 83 BPM | DIASTOLIC BLOOD PRESSURE: 80 MMHG | BODY MASS INDEX: 43.4 KG/M2

## 2018-01-11 DIAGNOSIS — D06.9 SEVERE DYSPLASIA OF CERVIX (CIN III): ICD-10-CM

## 2018-01-11 DIAGNOSIS — Z01.419 ENCOUNTER FOR WELL WOMAN EXAM WITH ROUTINE GYNECOLOGICAL EXAM: Primary | ICD-10-CM

## 2018-01-11 DIAGNOSIS — N93.9 ABNORMAL UTERINE BLEEDING (AUB): ICD-10-CM

## 2018-01-11 RX ORDER — MEDROXYPROGESTERONE ACETATE 10 MG/1
TABLET ORAL
Qty: 30 TAB | Refills: 4 | Status: SHIPPED | OUTPATIENT
Start: 2018-01-11 | End: 2018-02-22
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
## 2018-01-11 NOTE — PATIENT INSTRUCTIONS
[employer] Help Desk: [phone]       Well Visit, Ages 25 to 48: Care Instructions  Your Care Instructions    Physical exams can help you stay healthy. Your doctor has checked your overall health and may have suggested ways to take good care of yourself. He or she also may have recommended tests. At home, you can help prevent illness with healthy eating, regular exercise, and other steps. Follow-up care is a key part of your treatment and safety. Be sure to make and go to all appointments, and call your doctor if you are having problems. It's also a good idea to know your test results and keep a list of the medicines you take. How can you care for yourself at home? · Reach and stay at a healthy weight. This will lower your risk for many problems, such as obesity, diabetes, heart disease, and high blood pressure. · Get at least 30 minutes of physical activity on most days of the week. Walking is a good choice. You also may want to do other activities, such as running, swimming, cycling, or playing tennis or team sports. Discuss any changes in your exercise program with your doctor. · Do not smoke or allow others to smoke around you. If you need help quitting, talk to your doctor about stop-smoking programs and medicines. These can increase your chances of quitting for good. · Talk to your doctor about whether you have any risk factors for sexually transmitted infections (STIs). Having one sex partner (who does not have STIs and does not have sex with anyone else) is a good way to avoid these infections. · Use birth control if you do not want to have children at this time. Talk with your doctor about the choices available and what might be best for you. · Protect your skin from too much sun. When you're outdoors from 10 a.m. to 4 p.m., stay in the shade or cover up with clothing and a hat with a wide brim. Wear sunglasses that block UV rays.  Even when it's cloudy, put broad-spectrum sunscreen (SPF 30 or higher) on any exposed skin. · See a dentist one or two times a year for checkups and to have your teeth cleaned. · Wear a seat belt in the car. · Drink alcohol in moderation, if at all. That means no more than 2 drinks a day for men and 1 drink a day for women. Follow your doctor's advice about when to have certain tests. These tests can spot problems early. For everyone  · Cholesterol. Have the fat (cholesterol) in your blood tested after age 21. Your doctor will tell you how often to have this done based on your age, family history, or other things that can increase your risk for heart disease. · Blood pressure. Have your blood pressure checked during a routine doctor visit. Your doctor will tell you how often to check your blood pressure based on your age, your blood pressure results, and other factors. · Vision. Talk with your doctor about how often to have a glaucoma test.  · Diabetes. Ask your doctor whether you should have tests for diabetes. · Colon cancer. Have a test for colon cancer at age 48. You may have one of several tests. If you are younger than 48, you may need a test earlier if you have any risk factors. Risk factors include whether you already had a precancerous polyp removed from your colon or whether your parent, brother, sister, or child has had colon cancer. For women  · Breast exam and mammogram. Talk to your doctor about when you should have a clinical breast exam and a mammogram. Medical experts differ on whether and how often women under 50 should have these tests. Your doctor can help you decide what is right for you. · Pap test and pelvic exam. Begin Pap tests at age 24. A Pap test is the best way to find cervical cancer. The test often is part of a pelvic exam. Ask how often to have this test.  · Tests for sexually transmitted infections (STIs). Ask whether you should have tests for STIs.  You may be at risk if you have sex with more than one person, especially if your partners do not wear condoms. For men  · Tests for sexually transmitted infections (STIs). Ask whether you should have tests for STIs. You may be at risk if you have sex with more than one person, especially if you do not wear a condom. · Testicular cancer exam. Ask your doctor whether you should check your testicles regularly. · Prostate exam. Talk to your doctor about whether you should have a blood test (called a PSA test) for prostate cancer. Experts differ on whether and when men should have this test. Some experts suggest it if you are older than 39 and are -American or have a father or brother who got prostate cancer when he was younger than 72. When should you call for help? Watch closely for changes in your health, and be sure to contact your doctor if you have any problems or symptoms that concern you. Where can you learn more? Go to http://arsh-gosia.info/. Enter P072 in the search box to learn more about \"Well Visit, Ages 25 to 48: Care Instructions. \"  Current as of: May 12, 2017  Content Version: 11.4  © 3148-3993 [employer]. Care instructions adapted under license by [employer] (which disclaims liability or warranty for this information). If you have questions about a medical condition or this instruction, always ask your healthcare professional. [address] any warranty or liability for your use of this information.

## 2018-01-11 NOTE — MR AVS SNAPSHOT
Visit Information Date & Time Provider Department Dept. Phone Encounter #  
 1/11/2018 10:20  S Karsten Guzmán, Ying More chelo [phone] [id number] Upcoming Health Maintenance Date Due Influenza Age 5 to Adult 8/1/2017 PAP AKA CERVICAL CYTOLOGY 9/28/2019 Allergies as of 1/11/2018  Review Complete On: 1/11/2018 By: Sigrid Landau No Known Allergies Current Immunizations  Never Reviewed Name Date  
 TD Vaccine 6/8/2011 10:48 PM  
  
 Not reviewed this visit You Were Diagnosed With   
  
 Codes Comments Encounter for well woman exam with routine gynecological exam    -  Primary ICD-10-CM: F10.025 ICD-9-CM: V72.31 Vitals BP Pulse Height(growth percentile) Weight(growth percentile) BMI OB Status 118/80 83 5' 9\" (1.753 m) (!) 367 lb (166.5 kg) 54.2 kg/m2 Unknown Smoking Status Never Smoker BMI and BSA Data Body Mass Index Body Surface Area  
 54.2 kg/m 2 2.85 m 2 Preferred Pharmacy Pharmacy Name Phone [address]. [phone] Your Updated Medication List  
  
   
This list is accurate as of: 1/11/18 11:04 AM.  Always use your most recent med list.  
  
  
  
  
 ALEVE 220 mg tablet Generic drug:  naproxen sodium Take 220 mg by mouth as needed. citalopram 40 mg tablet Commonly known as:  CELEXA  
TAKE ONE TABLET BY MOUTH DAILY  
  
 KYLEENA 17.5 mcg/24 hr (5 years) Iud IUD Generic drug:  levonorgestrel  
by IntraUTERine route. medroxyPROGESTERone 10 mg tablet Commonly known as:  PROVERA Take 1 tab daily 10d each month.  
  
 metFORMIN  mg tablet Commonly known as:  GLUCOPHAGE XR  
TAKE ONE TABLET BY MOUTH DAILY WITH DINNER  
  
 oxyCODONE-acetaminophen 5-325 mg per tablet Commonly known as:  PERCOCET Take 1 Tab by mouth every four (4) hours as needed for Pain for up to 10 doses. Max Daily Amount: 6 Tabs. traZODone 100 mg tablet Commonly known as:  DESYREL  
TAKE ONE TABLET BY MOUTH ONCE NIGHTLY Patient Instructions [facility]: [phone] Well Visit, Ages 25 to 48: Care Instructions Your Care Instructions Physical exams can help you stay healthy. Your doctor has checked your overall health and may have suggested ways to take good care of yourself. He or she also may have recommended tests. At home, you can help prevent illness with healthy eating, regular exercise, and other steps. Follow-up care is a key part of your treatment and safety. Be sure to make and go to all appointments, and call your doctor if you are having problems. It's also a good idea to know your test results and keep a list of the medicines you take. How can you care for yourself at home? · Reach and stay at a healthy weight. This will lower your risk for many problems, such as obesity, diabetes, heart disease, and high blood pressure. · Get at least 30 minutes of physical activity on most days of the week. Walking is a good choice. You also may want to do other activities, such as running, swimming, cycling, or playing tennis or team sports. Discuss any changes in your exercise program with your doctor. · Do not smoke or allow others to smoke around you. If you need help quitting, talk to your doctor about stop-smoking programs and medicines. These can increase your chances of quitting for good. · Talk to your doctor about whether you have any risk factors for sexually transmitted infections (STIs). Having one sex partner (who does not have STIs and does not have sex with anyone else) is a good way to avoid these infections. · Use birth control if you do not want to have children at this time. Talk with your doctor about the choices available and what might be best for you. · Protect your skin from too much sun. When you're outdoors from 10 a.m. to 4 p.m., stay in the shade or cover up with clothing and a hat with a wide brim. Wear sunglasses that block UV rays. Even when it's cloudy, put broad-spectrum sunscreen (SPF 30 or higher) on any exposed skin. · See a dentist one or two times a year for checkups and to have your teeth cleaned. · Wear a seat belt in the car. · Drink alcohol in moderation, if at all. That means no more than 2 drinks a day for men and 1 drink a day for women. Follow your doctor's advice about when to have certain tests. These tests can spot problems early. For everyone · Cholesterol. Have the fat (cholesterol) in your blood tested after age 21. Your doctor will tell you how often to have this done based on your age, family history, or other things that can increase your risk for heart disease. · Blood pressure. Have your blood pressure checked during a routine doctor visit. Your doctor will tell you how often to check your blood pressure based on your age, your blood pressure results, and other factors. · Vision. Talk with your doctor about how often to have a glaucoma test. 
· Diabetes. Ask your doctor whether you should have tests for diabetes. · Colon cancer. Have a test for colon cancer at age 48. You may have one of several tests. If you are younger than 48, you may need a test earlier if you have any risk factors. Risk factors include whether you already had a precancerous polyp removed from your colon or whether your parent, brother, sister, or child has had colon cancer. For women · Breast exam and mammogram. Talk to your doctor about when you should have a clinical breast exam and a mammogram. Medical experts differ on whether and how often women under 50 should have these tests. Your doctor can help you decide what is right for you. · Pap test and pelvic exam. Begin Pap tests at age 24. A Pap test is the best way to find cervical cancer.  The test often is part of a pelvic exam. Ask how often to have this test. 
 · Tests for sexually transmitted infections (STIs). Ask whether you should have tests for STIs. You may be at risk if you have sex with more than one person, especially if your partners do not wear condoms. For men · Tests for sexually transmitted infections (STIs). Ask whether you should have tests for STIs. You may be at risk if you have sex with more than one person, especially if you do not wear a condom. · Testicular cancer exam. Ask your doctor whether you should check your testicles regularly. · Prostate exam. Talk to your doctor about whether you should have a blood test (called a PSA test) for prostate cancer. Experts differ on whether and when men should have this test. Some experts suggest it if you are older than 39 and are -American or have a father or brother who got prostate cancer when he was younger than 72. When should you call for help? Watch closely for changes in your health, and be sure to contact your doctor if you have any problems or symptoms that concern you. Where can you learn more? Go to http://arsh-gosia.info/. Enter P072 in the search box to learn more about \"Well Visit, Ages 25 to 48: Care Instructions. \" Current as of: May 12, 2017 Content Version: 11.4 © 3605-0662 Healthwise, Incorporated. Care instructions adapted under license by [employer] (which disclaims liability or warranty for this information). If you have questions about a medical condition or this instruction, always ask your healthcare professional. [place] [address] any warranty or liability for your use of this information. Introducing [facility] [place] [facility] SERVICES! Sue Monsivais introduces [employer] patient portal. Now you can access parts of your medical record, email your doctor's office, and request medication refills online. 1. In your internet browser, go to [email or web address] 2. Click on the First Time User? Click Here link in the Sign In box. You will see the New Member Sign Up page. 3. Enter your [employer] Access Code exactly as it appears below. You will not need to use this code after youve completed the sign-up process. If you do not sign up before the expiration date, you must request a new code. · [employer] Access Code: [email or web address] Expires: 2/3/2018 12:30 PM 
 
4. Enter the last four digits of your Social Security Number (xxxx) and Date of Birth (mm/dd/yyyy) as indicated and click Submit. You will be taken to the next sign-up page. 5. Create a [employer] ID. This will be your [employer] login ID and cannot be changed, so think of one that is secure and easy to remember. 6. Create a [employer] password. You can change your password at any time. 7. Enter your Password Reset Question and Answer. This can be used at a later time if you forget your password. 8. Enter your e-mail address. You will receive e-mail notification when new information is available in [address] 9. Click Sign Up. You can now view and download portions of your medical record. 10. Click the Download Summary menu link to download a portable copy of your medical information. If you have questions, please visit the Frequently Asked Questions section of the [employer] website. Remember, [employer] is NOT to be used for urgent needs. For medical emergencies, dial 911. Now available from your iPhone and Android! Please provide this summary of care documentation to your next provider. Your primary care clinician is listed as Linda Young. If you have any questions after today's visit, please call [phone].

## 2018-01-13 LAB
CYTOLOGIST CVX/VAG CYTO: ABNORMAL
CYTOLOGY CVX/VAG DOC THIN PREP: ABNORMAL
CYTOLOGY HISTORY:: ABNORMAL
DX ICD CODE: ABNORMAL
DX ICD CODE: ABNORMAL
HPV I/H RISK 1 DNA CVX QL PROBE+SIG AMP: NEGATIVE
Lab: ABNORMAL
OTHER STN SPEC: ABNORMAL
PATH REPORT.FINAL DX SPEC: ABNORMAL
PATHOLOGIST CVX/VAG CYTO: ABNORMAL
STAT OF ADQ CVX/VAG CYTO-IMP: ABNORMAL

## 2018-01-17 ENCOUNTER — TELEPHONE (OUTPATIENT)
Dept: OBGYN CLINIC | Age: 43
End: 2018-01-17

## 2018-01-17 NOTE — TELEPHONE ENCOUNTER
Patient returned my call, I advised as indicated and she prefers to have colpo/leep in OR. Does not want to go through any further pain. I advised I would check with DM and will call her back, she voice understanding.

## 2018-01-17 NOTE — TELEPHONE ENCOUNTER
----- Message from Moi Henry MD sent at 1/15/2018 10:14 AM EST -----  ASCUS with neg HPV. Will need colpo. Please add to FS, notify pt, tickle.

## 2018-01-24 ENCOUNTER — TELEPHONE (OUTPATIENT)
Dept: FAMILY MEDICINE CLINIC | Age: 43
End: 2018-01-24

## 2018-01-24 ENCOUNTER — OFFICE VISIT (OUTPATIENT)
Dept: FAMILY MEDICINE CLINIC | Age: 43
End: 2018-01-24

## 2018-01-24 VITALS
HEIGHT: 69 IN | RESPIRATION RATE: 18 BRPM | HEART RATE: 83 BPM | BODY MASS INDEX: 43.4 KG/M2 | TEMPERATURE: 98.8 F | DIASTOLIC BLOOD PRESSURE: 82 MMHG | WEIGHT: 293 LBS | SYSTOLIC BLOOD PRESSURE: 146 MMHG

## 2018-01-24 DIAGNOSIS — R45.851 SUICIDAL IDEATION: ICD-10-CM

## 2018-01-24 DIAGNOSIS — F33.2 SEVERE RECURRENT MAJOR DEPRESSION WITHOUT PSYCHOTIC FEATURES (HCC): Primary | ICD-10-CM

## 2018-01-24 DIAGNOSIS — G47.01 INSOMNIA DUE TO MEDICAL CONDITION: ICD-10-CM

## 2018-01-24 RX ORDER — CITALOPRAM 20 MG/1
20 TABLET, FILM COATED ORAL DAILY
Qty: 15 TAB | Refills: 0 | Status: SHIPPED | OUTPATIENT
Start: 2018-01-24 | End: 2018-01-30 | Stop reason: SDUPTHER

## 2018-01-24 RX ORDER — TRAZODONE HYDROCHLORIDE 100 MG/1
TABLET ORAL
Qty: 15 TAB | Refills: 0 | Status: SHIPPED | OUTPATIENT
Start: 2018-01-24 | End: 2018-02-22 | Stop reason: SDUPTHER

## 2018-01-24 NOTE — PROGRESS NOTES
Chief Complaint   Patient presents with    Depression     suicidal ideations; x 1 mo; went to ER [facility] this am due to this and has safety plan   Not taking Celexa and Trazadone due to unable to afford medication. 1. Have you been to the ER, urgent care clinic since your last visit? Yes 01/24/18 Darcy Palumbo for suicide ideation Hospitalized since your last visit? No     2. Have you seen or consulted any other health care providers outside of the [address] [place] [address] since your last visit? Include any pap smears or colon screening.   No

## 2018-01-24 NOTE — TELEPHONE ENCOUNTER
Seamus Cary, with [address],  called the office in regard to pt. Advised pt had come in to see them and spoke of suicidal thoughts, but no actions. Seamus Cary wanted to know if Dr. Nader Jo would re prescribe pt's Celexa and Trazodone, stating pt had not been taking any medications, and she is trying to provide a safety plan for pt. When I asked Seamus Cary if they were planning to admit pt, she states they are trying to keep pt out of the hospital. And would like pt to see Dr. Nader Jo this week, as part of safety plan, and get pt back on her medications. Spoke with Dr. Nader Jo, and per Dr. Laly Recinos orders, pt has been scheduled to be seen today, at 18 and advised to present to office by [phone], due to not being seen by Dr. Nader Jo since 09/2016. Dr. Nader Jo will advised and treat at that time, and Seamus Cary was also asked to fax a copy of pt's safety plan to our office, fax number has been provided. Seamus Cary will advise pt of her appointment.

## 2018-01-24 NOTE — PROGRESS NOTES
HISTORY OF PRESENT ILLNESS  Tiffanie Fairchild is a 38 y.o. female. HPI Comments: Manjula Boss after being seen at the [facility] ER this am for suicidal ideation. After losing it and threatening to hang herself while at work today, her supervisor called the police, who took her to the ER. Admits to biting herself and punching her hand into glass. She has major depression, she has not been able to come in because she lost her job in July and could not afford her medication. The ER discharged her with a verbal safety agreement, which she intends to keep. I have received this and reviewed it. She plans to stay with a friend tonight and tomorrow. Evidently, [address] saw her in the ER and they are working to schedule her with Psychiatry. Also, they are to give her a follow up phone call tomorrow. Depression   The history is provided by the patient. This is a recurrent problem. Episode onset: this time in October. The problem occurs constantly. The problem has been gradually worsening. Associated symptoms include shortness of breath. Pertinent negatives include no chest pain. Exacerbated by: changing jobs. \"I can't handle change. \"  Their expectations that she be in a pleasant mood and not cuss is not her. Relieved by: not having to work. The treatment provided moderate relief. Review of Systems   Respiratory: Positive for shortness of breath. Cardiovascular: Positive for palpitations. Negative for chest pain. Psychiatric/Behavioral: Positive for depression and suicidal ideas. Negative for substance abuse. The patient is nervous/anxious and has insomnia. Denies suicidal ideation right now.        Visit Vitals    /82    Pulse 83    Temp 98.8 °F (37.1 °C) (Oral)    Resp 18    Ht 5' 9\" (1.753 m)    Wt (!) 362 lb (164.2 kg)    BMI 53.46 kg/m2     BP Readings from Last 3 Encounters:   01/24/18 146/82   01/11/18 118/80   10/05/17 123/80     Physical Exam   Constitutional: She is oriented to person, place, and time. She appears well-developed and well-nourished. No distress. Cardiovascular: Normal rate, regular rhythm and normal heart sounds. Exam reveals no gallop and no friction rub. No murmur heard. Pulmonary/Chest: Effort normal and breath sounds normal. No respiratory distress. She has no wheezes. She has no rales. Neurological: She is alert and oriented to person, place, and time. She displays no tremor. Skin: Skin is warm and dry. She is not diaphoretic. Psychiatric: Her speech is normal and behavior is normal. Thought content normal. She exhibits a depressed mood. Nursing note and vitals reviewed. ASSESSMENT and PLAN    ICD-10-CM ICD-9-CM    1. Severe recurrent major depression without psychotic features ([facility]) F33.2 296.33 citalopram (CELEXA) 20 mg tablet   2. Suicidal ideation R45. 851 V62.84    3. Insomnia due to medical condition G47.01 327.01 traZODone (DESYREL) 100 mg tablet        Patient has verbally contracted for safety with me and mental health  Restart Celexa  Restart trazodone    Follow-up Disposition:  Return in about 1 week (around 1/31/2018) for depression. Reviewed plan of care. Patient has provided input and agrees with goals.

## 2018-01-24 NOTE — MR AVS SNAPSHOT
[address] [phone] Patient: Leo Chavarria MRN: [id number] GMM:[birth year] Visit Information Date & Time Provider Department Dept. Phone Encounter #  
 1/24/2018  1:15 PM Franca Zambrano 34 [id number] Upcoming Health Maintenance Date Due DTaP/Tdap/Td series (1 - Tdap) 6/9/2011 PAP AKA CERVICAL CYTOLOGY 1/11/2021 Allergies as of 1/24/2018  Review Complete On: 1/24/2018 By: Faustino Ruth MD  
 No Known Allergies Current Immunizations  Never Reviewed Name Date  
 TD Vaccine 6/8/2011 10:48 PM  
  
 Not reviewed this visit You Were Diagnosed With   
  
 Codes Comments Severe recurrent major depression without [facility] features [facility]    -  Primary ICD-10-CM: F33.2 ICD-9-CM: 296.33 Insomnia due to medical condition     ICD-10-CM: G47.01 
ICD-9-CM: 327.01 Vitals BP Pulse Temp Resp Height(growth percentile) Weight(growth percentile) 146/82 83 98.8 °F (37.1 °C) (Oral) 18 5' 9\" (1.753 m) (!) 362 lb (164.2 kg) BMI OB Status Smoking Status 53.46 kg/m2 Unknown Never Smoker Vitals History BMI and BSA Data Body Mass Index Body Surface Area  
 53.46 kg/m 2 2.83 m 2 Preferred Pharmacy Pharmacy Name Phone Winifred Nieves [address] [place] [address]. [phone] Your Updated Medication List  
  
   
This list is accurate as of: 1/24/18  2:38 PM.  Always use your most recent med list.  
  
  
  
  
 citalopram 20 mg tablet Commonly known as:  Benjy Bird Take 1 Tab by mouth daily. medroxyPROGESTERone 10 mg tablet Commonly known as:  PROVERA Take 1 tab daily 10d each month. traZODone 100 mg tablet Commonly known as:  DESYREL  
TAKE ONE TABLET BY MOUTH ONCE NIGHTLY Prescriptions Sent to Pharmacy Refills citalopram (CELEXA) 20 mg tablet 0 Sig: Take 1 Tab by mouth daily. Class: Normal  
 Pharmacy: [address], [address] [place] [address]. Ph #: [phone] Route: Oral  
 traZODone (DESYREL) 100 mg tablet 0 Sig: TAKE ONE TABLET BY MOUTH ONCE NIGHTLY Class: Normal  
 Pharmacy: [address], [address] [place] [address]. Ph #: [phone] Introducing [facility] & [facility] SERVICES! Hannah Pacheco introduces [employer] patient portal. Now you can access parts of your medical record, email your doctor's office, and request medication refills online. 1. In your internet browser, go to [email or web address] 2. Click on the First Time User? Click Here link in the Sign In box. You will see the New Member Sign Up page. 3. Enter your [employer] Access Code exactly as it appears below. You will not need to use this code after youve completed the sign-up process. If you do not sign up before the expiration date, you must request a new code. · [employer] Access Code: [email or web address] Expires: 2/3/2018 12:30 PM 
 
4. Enter the last four digits of your Social Security Number (xxxx) and Date of Birth (mm/dd/yyyy) as indicated and click Submit. You will be taken to the next sign-up page. 5. Create a L[employer] Software ID. This will be your [employer] login ID and cannot be changed, so think of one that is secure and easy to remember. 6. Create a [employer] password. You can change your password at any time. 7. Enter your Password Reset Question and Answer. This can be used at a later time if you forget your password. 8. Enter your e-mail address. You will receive e-mail notification when new information is available in [address] 9. Click Sign Up. You can now view and download portions of your medical record. 10. Click the Download Summary menu link to download a portable copy of your medical information.  
 
If you have questions, please visit the Frequently Asked Questions section of the [employer] Tornado. Remember, Wikidothart is NOT to be used for urgent needs. For medical emergencies, dial 911. Now available from your iPhone and Android! Please provide this summary of care documentation to your next provider. Your primary care clinician is listed as Dustin Rich. If you have any questions after today's visit, please call [phone].

## 2018-01-29 NOTE — TELEPHONE ENCOUNTER
Talked with patient & asked her how she was doing & if she was having any suicidal thoughts. She stated, not really, asked her what did not really mean, she stated, she is occasionally thinking about suicide, but if she be admitted she will lose her job & her insurance. Patient informed, she may consider being admitted if she is having suicidal thoughts. Dr. Felipe Mata was informed of this conversation with the patient, and Dr. Felipe Mata stated, she has a written contract with patient that she will not commit suicide.

## 2018-01-30 ENCOUNTER — OFFICE VISIT (OUTPATIENT)
Dept: FAMILY MEDICINE CLINIC | Age: 43
End: 2018-01-30

## 2018-01-30 VITALS
SYSTOLIC BLOOD PRESSURE: 137 MMHG | HEART RATE: 112 BPM | RESPIRATION RATE: 18 BRPM | BODY MASS INDEX: 43.4 KG/M2 | DIASTOLIC BLOOD PRESSURE: 76 MMHG | WEIGHT: 293 LBS | TEMPERATURE: 99.2 F | HEIGHT: 69 IN

## 2018-01-30 DIAGNOSIS — F33.2 SEVERE RECURRENT MAJOR DEPRESSION WITHOUT PSYCHOTIC FEATURES (HCC): Primary | ICD-10-CM

## 2018-01-30 RX ORDER — CITALOPRAM 40 MG/1
40 TABLET, FILM COATED ORAL DAILY
Qty: 30 TAB | Refills: 0 | Status: SHIPPED | OUTPATIENT
Start: 2018-01-30 | End: 2018-02-22 | Stop reason: SDUPTHER

## 2018-01-30 NOTE — PROGRESS NOTES
Chief Complaint   Patient presents with    Depression     and suicidal ideations     1. Have you been to the ER, urgent care clinic since your last visit? No Hospitalized since your last visit? No    2. Have you seen or consulted any other health care providers outside of the [address] [place] [address] since your last visit? Include any pap smears or colon screening. [employer] therapy    Pt does not feel comfortable signing permission for dr to receive medical records.

## 2018-01-30 NOTE — MR AVS SNAPSHOT
[address] 
[phone] Patient: Sadi Palumbo MRN: [id number] RGA:[birth year] Visit Information Date & Time Provider Department Dept. Phone Encounter #  
 1/30/2018  2:30 PM Franca Childers 34 [id number] Upcoming Health Maintenance Date Due DTaP/Tdap/Td series (1 - Tdap) 6/9/2011 PAP AKA CERVICAL CYTOLOGY 1/11/2021 Allergies as of 1/30/2018  Review Complete On: 1/30/2018 By: Cindy Patel MD  
 No Known Allergies Current Immunizations  Never Reviewed Name Date  
 TD Vaccine 6/8/2011 10:48 PM  
  
 Not reviewed this visit You Were Diagnosed With   
  
 Codes Comments Severe recurrent major depression without psychotic features [facility]    -  Primary ICD-10-CM: F33.2 ICD-9-CM: 296.33 Vitals BP Pulse Temp Resp Height(growth percentile) Weight(growth percentile)  
 137/76 (!) 112 99.2 °F (37.3 °C) (Oral) 18 5' 9\" (1.753 m) (!) 367 lb (166.5 kg) BMI OB Status Smoking Status 54.2 kg/m2 Unknown Never Smoker Vitals History BMI and BSA Data Body Mass Index Body Surface Area  
 54.2 kg/m 2 2.85 m 2 Preferred Pharmacy Pharmacy Name Phone Prakash [address] Delgado, [address] [place] [address]. [phone] Your Updated Medication List  
  
   
This list is accurate as of: 1/30/18  3:41 PM.  Always use your most recent med list.  
  
  
  
  
 citalopram 40 mg tablet Commonly known as:  Jeneal Bergamo Take 1 Tab by mouth daily. medroxyPROGESTERone 10 mg tablet Commonly known as:  PROVERA Take 1 tab daily 10d each month. traZODone 100 mg tablet Commonly known as:  DESYREL  
TAKE ONE TABLET BY MOUTH ONCE NIGHTLY Prescriptions Sent to Pharmacy Refills  
 citalopram (CELEXA) 40 mg tablet 0 Sig: Take 1 Tab by mouth daily.   
 Class: Normal  
 Pharmacy: Maryam Escobar [facility], [address] [place] [address].  #: [phone] Route: Oral  
  
Introducing [place] [facility]! Sea Booth introduces enrich-in patient portal. Now you can access parts of your medical record, email your doctor's office, and request medication refills online. 1. In your internet browser, go to [email or web address] Energy 2. Click on the First Time User? Click Here link in the Sign In box. You will see the New Member Sign Up page. 3. Enter your enrich-in Access Code exactly as it appears below. You will not need to use this code after youve completed the sign-up process. If you do not sign up before the expiration date, you must request a new code. · enrich-in Access Code: [email or web address] Expires: 2/3/2018 12:30 PM 
 
4. Enter the last four digits of your Social Security Number (xxxx) and Date of Birth (mm/dd/yyyy) as indicated and click Submit. You will be taken to the next sign-up page. 5. Create a enrich-in ID. This will be your enrich-in login ID and cannot be changed, so think of one that is secure and easy to remember. 6. Create a enrich-in password. You can change your password at any time. 7. Enter your Password Reset Question and Answer. This can be used at a later time if you forget your password. 8. Enter your e-mail address. You will receive e-mail notification when new information is available in [address] 9. Click Sign Up. You can now view and download portions of your medical record. 10. Click the Download Summary menu link to download a portable copy of your medical information. If you have questions, please visit the Frequently Asked Questions section of the enrich-in website. Remember, enrich-in is NOT to be used for urgent needs. For medical emergencies, dial 911. Now available from your iPhone and Android! Please provide this summary of care documentation to your next provider. Your primary care clinician is listed as Lisa Austin. If you have any questions after today's visit, please call [phone].

## 2018-01-30 NOTE — LETTER
NOTIFICATION RETURN TO WORK / SCHOOL 
 
1/30/2018 3:38 PM 
 
Ms. Carlito Ramos [address] [phone] To Whom It May Concern: 
 
Carlito Ramos is currently under the care of [address] [place] [address]. She will return to work/school on: 1/31/1918. She is to be excused for any distress that interferes with her job functioning. If there are questions or concerns please have the patient contact our office.  
 
 
 
Sincerely, 
 
 
Lacho Kennedy MD

## 2018-01-30 NOTE — PROGRESS NOTES
HISTORY OF PRESENT ILLNESS  Jeanie Schafer is a 38 y.o. female. Depression   The history is provided by the patient (she had a 2 hour therapy sessions this am). This is a chronic problem. The current episode started more than 1 week ago. The problem occurs constantly. The problem has been gradually improving. Associated symptoms include chest pain and shortness of breath. Nothing aggravates the symptoms. The symptoms are relieved by medications. Treatments tried: Celexa. The treatment provided moderate relief. Review of Systems   Constitutional: Positive for malaise/fatigue. Negative for weight loss. No weight gain. Her fatigue is improving. Respiratory: Positive for shortness of breath. Cardiovascular: Positive for chest pain. Psychiatric/Behavioral: Positive for depression. Negative for substance abuse and suicidal ideas. The patient has insomnia. The patient is not nervous/anxious. Visit Vitals    /76    Pulse (!) 112    Temp 99.2 °F (37.3 °C) (Oral)    Resp 18    Ht 5' 9\" (1.753 m)    Wt (!) 367 lb (166.5 kg)    BMI 54.2 kg/m2     Physical Exam   Constitutional: She is oriented to person, place, and time. She appears well-developed and well-nourished. No distress. Neurological: She is alert and oriented to person, place, and time. She displays no tremor. Skin: She is not diaphoretic. Psychiatric: She has a normal mood and affect. Her behavior is normal. Judgment and thought content normal.   Appears much calmer, less angry       ASSESSMENT and PLAN    ICD-10-CM ICD-9-CM    1. Severe recurrent major depression without psychotic features ([facility].) F33.2 296.33 citalopram (CELEXA) 40 mg tablet        Greatly improved, denies suicidal ideation (she mentions that she has thoughts of suicide and that they are dumb)  Increase Celexa  Continue with her therapist    Follow-up Disposition:  Return in about 3 weeks (around 2/20/2018) for depression. Reviewed plan of care. Patient has provided input and agrees with goals.

## 2018-02-21 ENCOUNTER — TELEPHONE (OUTPATIENT)
Dept: OBGYN CLINIC | Age: 43
End: 2018-02-21

## 2018-02-21 NOTE — TELEPHONE ENCOUNTER
----- Message from Mellissa Reilly MD sent at 2/19/2018 10:31 PM EST -----  Regarding: FW: colpo in OR? Can you please send Feliciano Libman a letter if you haven't done so already? Thanks    ----- Message -----     From: Shahzad Ac     Sent: 2/12/2018   9:54 AM       To: Mellissa Reilly MD  Subject: RE: colpo in OR? Left another message for her to call back. ----- Message -----     From: Mellissa Reilly MD     Sent: 2/9/2018   5:06 PM       To: Shahzad Ac  Subject: RE: colpo in OR? Do you know what she decided?  ----- Message -----     From: Shahzad Ac     Sent: 1/17/2018  10:49 AM       To: Mellissa Reilly MD  Subject: RE: colpo in OR? She can get the colpo in the OR but is she willing to pay hundreds of dollars compared to her office visit copay which is probably no more than $40. Anesthesia alone is $400.00. That doesn't include the hospital and doctor portion.  ----- Message -----     From: Mellissa Reilly MD     Sent: 1/17/2018  10:37 AM       To: Shahzad Ac  Subject: colpo in OR? Pt needs colpo. She is requesting that it be done in OR d/t discomfort. She is morbidly obese, making office procedure difficult.  (although I was able to do last colpo in the office)    Will her insurance cover colpo in OR?

## 2018-02-21 NOTE — TELEPHONE ENCOUNTER
Spoke with patient and she does not want to schedule for colposcopy. Prefers to repeat pap in one year. Made MD aware, will updated rohit.

## 2018-02-22 ENCOUNTER — OFFICE VISIT (OUTPATIENT)
Dept: FAMILY MEDICINE CLINIC | Age: 43
End: 2018-02-22

## 2018-02-22 VITALS
DIASTOLIC BLOOD PRESSURE: 76 MMHG | TEMPERATURE: 98.7 F | RESPIRATION RATE: 18 BRPM | HEIGHT: 69 IN | HEART RATE: 81 BPM | SYSTOLIC BLOOD PRESSURE: 134 MMHG | WEIGHT: 293 LBS | BODY MASS INDEX: 43.4 KG/M2

## 2018-02-22 DIAGNOSIS — G47.01 INSOMNIA DUE TO MEDICAL CONDITION: ICD-10-CM

## 2018-02-22 DIAGNOSIS — F33.2 SEVERE RECURRENT MAJOR DEPRESSION WITHOUT PSYCHOTIC FEATURES (HCC): Primary | ICD-10-CM

## 2018-02-22 RX ORDER — CITALOPRAM 40 MG/1
40 TABLET, FILM COATED ORAL DAILY
Qty: 30 TAB | Refills: 3 | Status: SHIPPED | OUTPATIENT
Start: 2018-02-22 | End: 2018-05-24 | Stop reason: ALTCHOICE

## 2018-02-22 RX ORDER — TRAZODONE HYDROCHLORIDE 100 MG/1
TABLET ORAL
Qty: 30 TAB | Refills: 11 | Status: SHIPPED | OUTPATIENT
Start: 2018-02-22 | End: 2019-08-08

## 2018-02-22 NOTE — MR AVS SNAPSHOT
[address] 
[phone] Patient: Marisol Ashraf MRN: [id number] VTE:7/3/2007 Visit Information Date & Time Provider Department Dept. Phone Encounter #  
 2/22/2018 11:15 AM Geremias Swanson MD [address] [place] [address] [id number] Follow-up Instructions Return in about 3 months (around 5/22/2018) for depression. Upcoming Health Maintenance Date Due DTaP/Tdap/Td series (1 - Tdap) 6/9/2011 PAP AKA CERVICAL CYTOLOGY 1/11/2019 Allergies as of 2/22/2018  Review Complete On: 2/22/2018 By: Geremias Swanson MD  
 No Known Allergies Current Immunizations  Never Reviewed Name Date  
 TD Vaccine 6/8/2011 10:48 PM  
  
 Not reviewed this visit You Were Diagnosed With   
  
 Codes Comments Insomnia due to medical condition     ICD-10-CM: G47.01 
ICD-9-CM: 327.01 Severe recurrent major depression without psychotic features ([facility]     ICD-10-CM: F33.2 ICD-9-CM: 296.33 Vitals BP Pulse Temp Resp Height(growth percentile) Weight(growth percentile) 134/76 81 98.7 °F (37.1 °C) (Oral) 18 5' 9\" (1.753 m) (!) 361 lb (163.7 kg) BMI OB Status Smoking Status 53.31 kg/m2 Unknown Never Smoker Vitals History BMI and BSA Data Body Mass Index Body Surface Area  
 53.31 kg/m 2 2.82 m 2 Preferred Pharmacy Pharmacy Name Phone [address] [place], [facility]. [phone] Your Updated Medication List  
  
   
This list is accurate as of 2/22/18 12:16 PM.  Always use your most recent med list.  
  
  
  
  
 citalopram 40 mg tablet Commonly known as:  Ray Chaka Take 1 Tab by mouth daily. traZODone 100 mg tablet Commonly known as:  DESYREL  
TAKE ONE TABLET BY MOUTH ONCE NIGHTLY Prescriptions Sent to Pharmacy  Refills  
 traZODone (DESYREL) 100 mg tablet 11  
 Sig: TAKE ONE TABLET BY MOUTH ONCE NIGHTLY Class: Normal  
 Pharmacy: [address] Ph #: [phone]  
 citalopram (CELEXA) 40 mg tablet 3 Sig: Take 1 Tab by mouth daily. Class: Normal  
 Pharmacy: [address] Ph #: [phone] Route: Oral  
  
Follow-up Instructions Return in about 3 months (around 5/22/2018) for depression. Patient Instructions Learning About Sleeping Well What does sleeping well mean? Sleeping well means getting enough sleep. How much sleep is enough varies among people. The number of hours you sleep is not as important as how you feel when you wake up. If you do not feel refreshed, you probably need more sleep. Another sign of not getting enough sleep is feeling tired during the day. The average total nightly sleep time is 7½ to 8 hours. Healthy adults may need a little more or a little less than this. Why is getting enough sleep important? Getting enough quality sleep is a basic part of good health. When your sleep suffers, your mood and your thoughts can suffer too. You may find yourself feeling more grumpy or stressed. Not getting enough sleep also can lead to serious problems, including injury, accidents, anxiety, and depression. What might cause poor sleeping? Many things can cause sleep problems, including: 
Stress. Stress can be caused by fear about a single event, such as giving a speech. Or you may have ongoing stress, such as worry about work or school. Depression, anxiety, and other mental or emotional conditions. Changes in your sleep habits or surroundings. This includes changes that happen where you sleep, such as noise, light, or sleeping in a different bed. It also includes changes in your sleep pattern, such as having jet lag or working a late shift. Health problems, such as pain, breathing problems, and restless legs syndrome. Lack of regular exercise. How can you help yourself? Here are some tips that may help you sleep more soundly and wake up feeling more refreshed. Your sleeping area Use your bedroom only for sleeping and sex. A bit of light reading may help you fall asleep. But if it doesn't, do your reading elsewhere in the house. Don't watch TV in bed. Be sure your bed is big enough to stretch out comfortably, especially if you have a sleep partner. Keep your bedroom quiet, dark, and cool. Use curtains, blinds, or a sleep mask to block out light. To block out noise, use earplugs, soothing music, or a \"white noise\" machine. Your evening and bedtime routine Get regular exercise, but not within 3 to 4 hours before your bedtime. Create a relaxing bedtime routine. You might want to take a warm shower or bath, listen to soothing music, or drink a cup of noncaffeinated tea. Go to bed at the same time every night. And get up at the same time every morning, even if you feel tired. What to avoid Limit caffeine (coffee, tea, caffeinated sodas) during the day, and don't have any for at least 4 to 6 hours before bedtime. Don't drink alcohol before bedtime. Alcohol can cause you to wake up more often during the night. Don't smoke or use tobacco, especially in the evening. Nicotine can keep you awake. Don't take naps during the day, especially close to bedtime. Don't lie in bed awake for too long. If you can't fall asleep, or if you wake up in the middle of the night and can't get back to sleep within 15 minutes or so, get out of bed and go to another room until you feel sleepy. Don't take medicine that may keep you awake, or make you feel hyper or energized, right before bed. Your doctor can tell you if your medicine may do this and if you can take it earlier in the day. If you can't sleep Imagine yourself in a peaceful, pleasant scene. Focus on the details and feelings of being in a place that is relaxing. Get up and do a quiet or boring activity until you feel sleepy. Don't drink any liquids after 6 p.m. if you wake up often because you have to go to the bathroom. Where can you learn more? Go to Iptune.be Enter A948 in the search box to learn more about \"Learning About Sleeping Well. \"  
© 7059-9236 Healthwise, [employer]. Care instructions adapted under license by [employer] (which disclaims liability or warranty for this information). This care instruction is for use with your licensed healthcare professional. If you have questions about a medical condition or this instruction, always ask your healthcare professional. [address] [address] any warranty or liability for your use of this information. Content Version: 9.4.61971; Last Revised: March 18, 2011 Insomnia: After Your Visit Your Care Instructions Insomnia is the inability to sleep well. It is a common problem for most people at some time. Insomnia may make it hard for you to get to sleep, stay asleep, or sleep as long as you need to. This can make you tired and grouchy during the day. It can also make you forgetful, less effective at work, and unhappy. Insomnia can be caused by conditions such as depression or anxiety. Pain can also affect your ability to sleep. When these problems are solved, the insomnia usually clears up. But sometimes bad sleep habits can cause insomnia. If insomnia is affecting your work or your enjoyment of life, you can take steps to improve your sleep. Follow-up care is a key part of your treatment and safety. Be sure to make and go to all appointments, and call your doctor if you are having problems. Its also a good idea to know your test results and keep a list of the medicines you take. How can you care for yourself at home? What to avoid Do not have drinks with caffeine, such as coffee or black tea, for 8 hours before bed. Do not smoke or use other types of tobacco near bedtime. Nicotine is a stimulant and can keep you awake. Avoid drinking alcohol late in the evening, because it can cause you to wake in the middle of the night. Do not eat a big meal close to bedtime. If you are hungry, eat a light snack. Do not drink a lot of water close to bedtime, because the need to urinate may wake you up during the night. Do not read or watch TV in bed. Use the bed only for sleeping and sexual activity. What to try Go to bed at the same time every night, and wake up at the same time every morning. Do not take naps during the day. Keep your bedroom quiet, dark, and cool. Get regular exercise, but not within 3 to 4 hours of your bedtime. Maria Teresa Melton Sleep on a comfortable pillow and mattress. If watching the clock makes you anxious, turn it facing away from you so you cannot see the time. If you worry when you lie down, start a worry book. Well before bedtime, write down your worries, and then set the book and your concerns aside. Try meditation or other relaxation techniques before you go to bed. If you cannot fall asleep after 15 minutes, get up and go to another room until you feel sleepy. Do something relaxing. Repeat your bedtime routine before you go to bed again. Repeat this as often as necessary. Make your house quiet and calm about an hour before bedtime. Turn down the lights, turn off the TV, log off the computer, and turn down the volume on music. This can help you relax after a busy day. When should you call for help? Watch closely for changes in your health, and be sure to contact your doctor if: 
Your efforts to improve your sleep do not work. Your insomnia gets worse. You fall asleep during the day. Where can you learn more? Go to [employer]e-games.be Enter P513 in the search box to learn more about \"Insomnia: After Your Visit. \"  
 © 9531-8488 Healthwise, Incorporated. Care instructions adapted under license by Irma Lee (which disclaims liability or warranty for this information). This care instruction is for use with your licensed healthcare professional. If you have questions about a medical condition or this instruction, always ask your healthcare professional. [address] any warranty or liability for your use of this information. Content Version: 7.5.69802; Last Revised: June 26, 2010 Introducing [facility] SERVICES! Irma Lee introduces [employer] patient portal. Now you can access parts of your medical record, email your doctor's office, and request medication refills online. 1. In your internet browser, go to [email or web address] 2. Click on the First Time User? Click Here link in the Sign In box. You will see the New Member Sign Up page. 3. Enter your [employer] Access Code exactly as it appears below. You will not need to use this code after youve completed the sign-up process. If you do not sign up before the expiration date, you must request a new code. · [employer] Access Code: [email or web address] Expires: 5/23/2018 12:16 PM 
 
4. Enter the last four digits of your Social Security Number (xxxx) and Date of Birth (mm/dd/yyyy) as indicated and click Submit. You will be taken to the next sign-up page. 5. Create a [employer] ID. This will be your [employer] login ID and cannot be changed, so think of one that is secure and easy to remember. 6. Create a [employer] password. You can change your password at any time. 7. Enter your Password Reset Question and Answer. This can be used at a later time if you forget your password. 8. Enter your e-mail address. You will receive e-mail notification when new information is available in [address] 9. Click Sign Up. You can now view and download portions of your medical record. 10. Click the Download Summary menu link to download a portable copy of your medical information. If you have questions, please visit the Frequently Asked Questions section of the [employer] website. Remember, [employer] is NOT to be used for urgent needs. For medical emergencies, dial 911. Now available from your iPhone and Android! Please provide this summary of care documentation to your next provider. Your primary care clinician is listed as Nahun Mathews. If you have any questions after today's visit, please call [phone].

## 2018-02-22 NOTE — PROGRESS NOTES
HISTORY OF PRESENT ILLNESS  Carmencita Conteh is a [address] y.[address] female. Depression   The history is provided by the patient (she continues with counseling). This is a chronic problem. The current episode started more than 1 week ago. The problem occurs constantly. The problem has been gradually improving. Pertinent negatives include no chest pain and no shortness of breath. Nothing aggravates the symptoms. The symptoms are relieved by medications. Treatments tried: Celexa. The treatment provided significant relief. 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 No  radiation. She is getting excellent relief with her TENS unit.). Pertinent negatives include no chest pain and no shortness of breath. Review of Systems   Constitutional: Positive for malaise/fatigue. Negative for weight loss. No weight gain   Respiratory: Negative for shortness of breath. Cardiovascular: Positive for palpitations. Negative for chest pain. Psychiatric/Behavioral: Negative for depression, substance abuse and suicidal ideas. The patient is nervous/anxious and has insomnia. Visit Vitals    /76    Pulse 81    Temp 98.7 °F (37.1 °C) (Oral)    Resp 18    Ht 5' 9\" (1.753 m)    Wt (!) 361 lb (163.7 kg)    BMI 53.31 kg/m2     Physical Exam   Constitutional: She is oriented to person, place, and time. She appears well-developed and well-nourished. No distress. Neurological: She is alert and oriented to person, place, and time. She displays no tremor. Skin: She is not diaphoretic. Psychiatric: She has a normal mood and affect. Her behavior is normal. Judgment and thought content normal.       ASSESSMENT and PLAN    ICD-10-CM ICD-9-CM    1. Severe recurrent major depression without psychotic features ([facility]) F33.2 296.33 citalopram (CELEXA) 40 mg tablet   2.  Insomnia due to medical condition G47.01 327.01 traZODone (DESYREL) 100 mg tablet        Doing much better  Continue counseling  Continue Celexa  Add trazodone    Follow-up Disposition:  Return in about 3 months (around 5/22/2018) for depression. Reviewed plan of care. Patient has provided input and agrees with goals.

## 2018-02-22 NOTE — PATIENT INSTRUCTIONS
Learning About Sleeping Well  What does sleeping well mean? Sleeping well means getting enough sleep. How much sleep is enough varies among people. The number of hours you sleep is not as important as how you feel when you wake up. If you do not feel refreshed, you probably need more sleep. Another sign of not getting enough sleep is feeling tired during the day. The average total nightly sleep time is 7½ to 8 hours. Healthy adults may need a little more or a little less than this. Why is getting enough sleep important? Getting enough quality sleep is a basic part of good health. When your sleep suffers, your mood and your thoughts can suffer too. You may find yourself feeling more grumpy or stressed. Not getting enough sleep also can lead to serious problems, including injury, accidents, anxiety, and depression. What might cause poor sleeping? Many things can cause sleep problems, including:  Stress. Stress can be caused by fear about a single event, such as giving a speech. Or you may have ongoing stress, such as worry about work or school. Depression, anxiety, and other mental or emotional conditions. Changes in your sleep habits or surroundings. This includes changes that happen where you sleep, such as noise, light, or sleeping in a different bed. It also includes changes in your sleep pattern, such as having jet lag or working a late shift. Health problems, such as pain, breathing problems, and restless legs syndrome. Lack of regular exercise. How can you help yourself? Here are some tips that may help you sleep more soundly and wake up feeling more refreshed. Your sleeping area  Use your bedroom only for sleeping and sex. A bit of light reading may help you fall asleep. But if it doesn't, do your reading elsewhere in the house. Don't watch TV in bed. Be sure your bed is big enough to stretch out comfortably, especially if you have a sleep partner. Keep your bedroom quiet, dark, and cool. Use curtains, blinds, or a sleep mask to block out light. To block out noise, use earplugs, soothing music, or a \"white noise\" machine. Your evening and bedtime routine  Get regular exercise, but not within 3 to 4 hours before your bedtime. Create a relaxing bedtime routine. You might want to take a warm shower or bath, listen to soothing music, or drink a cup of noncaffeinated tea. Go to bed at the same time every night. And get up at the same time every morning, even if you feel tired. What to avoid  Limit caffeine (coffee, tea, caffeinated sodas) during the day, and don't have any for at least 4 to 6 hours before bedtime. Don't drink alcohol before bedtime. Alcohol can cause you to wake up more often during the night. Don't smoke or use tobacco, especially in the evening. Nicotine can keep you awake. Don't take naps during the day, especially close to bedtime. Don't lie in bed awake for too long. If you can't fall asleep, or if you wake up in the middle of the night and can't get back to sleep within 15 minutes or so, get out of bed and go to another room until you feel sleepy. Don't take medicine that may keep you awake, or make you feel hyper or energized, right before bed. Your doctor can tell you if your medicine may do this and if you can take it earlier in the day. If you can't sleep  Imagine yourself in a peaceful, pleasant scene. Focus on the details and feelings of being in a place that is relaxing. Get up and do a quiet or boring activity until you feel sleepy. Don't drink any liquids after 6 p.m. if you wake up often because you have to go to the bathroom. Where can you learn more? Go to [employer]  Enter K548 in the search box to learn more about \"Learning About Sleeping Well. \"   © 1024-6322 Healthwise, Incorporated. Care instructions adapted under license by [employer] (which disclaims liability or warranty for this information).  This care instruction is for use with your licensed healthcare professional. If you have questions about a medical condition or this instruction, always ask your healthcare professional. [place] [address] any warranty or liability for your use of this information. Content Version: 4.4.39230; Last Revised: March 18, 2011          Insomnia: After Your Visit  Your Care Instructions  Insomnia is the inability to sleep well. It is a common problem for most people at some time. Insomnia may make it hard for you to get to sleep, stay asleep, or sleep as long as you need to. This can make you tired and grouchy during the day. It can also make you forgetful, less effective at work, and unhappy. Insomnia can be caused by conditions such as depression or anxiety. Pain can also affect your ability to sleep. When these problems are solved, the insomnia usually clears up. But sometimes bad sleep habits can cause insomnia. If insomnia is affecting your work or your enjoyment of life, you can take steps to improve your sleep. Follow-up care is a key part of your treatment and safety. Be sure to make and go to all appointments, and call your doctor if you are having problems. Its also a good idea to know your test results and keep a list of the medicines you take. How can you care for yourself at home? What to avoid  Do not have drinks with caffeine, such as coffee or black tea, for 8 hours before bed. Do not smoke or use other types of tobacco near bedtime. Nicotine is a stimulant and can keep you awake. Avoid drinking alcohol late in the evening, because it can cause you to wake in the middle of the night. Do not eat a big meal close to bedtime. If you are hungry, eat a light snack. Do not drink a lot of water close to bedtime, because the need to urinate may wake you up during the night. Do not read or watch TV in bed. Use the bed only for sleeping and sexual activity.   What to try  Go to bed at the same time every night, and wake up at the same time every morning. Do not take naps during the day. Keep your bedroom quiet, dark, and cool. Get regular exercise, but not within 3 to 4 hours of your bedtime. .   Sleep on a comfortable pillow and mattress. If watching the clock makes you anxious, turn it facing away from you so you cannot see the time. If you worry when you lie down, start a worry book. Well before bedtime, write down your worries, and then set the book and your concerns aside. Try meditation or other relaxation techniques before you go to bed. If you cannot fall asleep after 15 minutes, get up and go to another room until you feel sleepy. Do something relaxing. Repeat your bedtime routine before you go to bed again. Repeat this as often as necessary. Make your house quiet and calm about an hour before bedtime. Turn down the lights, turn off the TV, log off the computer, and turn down the volume on music. This can help you relax after a busy day. When should you call for help? Watch closely for changes in your health, and be sure to contact your doctor if:  Your efforts to improve your sleep do not work. Your insomnia gets worse. You fall asleep during the day. Where can you learn more? Go to [employer]be  Enter P513 in the search box to learn more about \"Insomnia: After Your Visit. \"   © 7111-7719 Healthwise, Incorporated. Care instructions adapted under license by [employer] (which disclaims liability or warranty for this information). This care instruction is for use with your licensed healthcare professional. If you have questions about a medical condition or this instruction, always ask your healthcare professional. [place] [address] any warranty or liability for your use of this information.   Content Version: 1.3.38561; Last Revised: June 26, 2010

## 2018-02-22 NOTE — PROGRESS NOTES
Chief Complaint   Patient presents with    Depression     3 wk f/u     1. Have you been to the ER, urgent care clinic since your last visit? Hospitalized since your last visit? No     2. Have you seen or consulted any other health care providers outside of the [address] [place] [address] since your last visit? Include any pap smears or colon screening.  No

## 2018-05-24 ENCOUNTER — OFFICE VISIT (OUTPATIENT)
Dept: FAMILY MEDICINE CLINIC | Age: 43
End: 2018-05-24

## 2018-05-24 VITALS
WEIGHT: 293 LBS | HEIGHT: 69 IN | DIASTOLIC BLOOD PRESSURE: 66 MMHG | TEMPERATURE: 98.1 F | BODY MASS INDEX: 43.4 KG/M2 | RESPIRATION RATE: 20 BRPM | HEART RATE: 96 BPM | SYSTOLIC BLOOD PRESSURE: 132 MMHG

## 2018-05-24 DIAGNOSIS — F33.2 SEVERE RECURRENT MAJOR DEPRESSION WITHOUT PSYCHOTIC FEATURES (HCC): Primary | ICD-10-CM

## 2018-05-24 RX ORDER — VENLAFAXINE HYDROCHLORIDE 75 MG/1
75 CAPSULE, EXTENDED RELEASE ORAL DAILY
Qty: 30 CAP | Refills: 0 | Status: SHIPPED | OUTPATIENT
Start: 2018-05-24 | End: 2019-08-08 | Stop reason: SINTOL

## 2018-05-24 NOTE — PROGRESS NOTES
HISTORY OF PRESENT ILLNESS  Cheryl Best is a 41 y.o. female. HPI Comments: Cheryl Best is here for follow up on her depression. She is taking Celexa with is working somewhat, but is unsure whether she is better or worse. Evidently, she is experiencing sudden depression, where she feels doomed for about a day about twice a week, but is able to cope. She is doing OK at work. No appetite change, but she has gained weight - \"a lot of fluid build up lately\". She is still in counseling and she is in a group session. Review of Systems   Constitutional: Positive for malaise/fatigue. Negative for weight loss. Weight gain   Psychiatric/Behavioral: Positive for depression. Negative for substance abuse and suicidal ideas. The patient is nervous/anxious and has insomnia. Visit Vitals    /66    Pulse 96    Temp 98.1 °F (36.7 °C) (Oral)    Resp 20    Ht 5' 9\" (1.753 m)    Wt (!) 374 lb (169.6 kg)    BMI 55.23 kg/m2     Physical Exam   Constitutional: She is oriented to person, place, and time. She appears well-developed and well-nourished. No distress. Neurological: She is alert and oriented to person, place, and time. She displays no tremor. Skin: She is not diaphoretic. Psychiatric: She has a normal mood and affect. Her behavior is normal. Judgment and thought content normal.   nonchalant       ASSESSMENT and PLAN    ICD-10-CM ICD-9-CM    1. Severe recurrent major depression without psychotic features ([facility] F33.2 296.33 venlafaxine-SR (EFFEXOR XR) 75 mg capsule        Needs improvement  Stop Celexa  Start Effexor    Follow-up Disposition:  Return in about 3 weeks (around 6/14/2018) for depression. Reviewed plan of care. Patient has provided input and agrees with goals.

## 2018-05-24 NOTE — MR AVS SNAPSHOT
[address] 
[phone] Patient: Deena Shepard MRN: [id number] ZSR:[id number] Visit Information Date & Time Provider Department Dept. Phone Encounter #  
 5/24/2018 11:15 AM Huy Booker MD [facility] 34 [id number] Follow-up Instructions Return in about 3 weeks (around 6/14/2018) for depression. Upcoming Health Maintenance Date Due DTaP/Tdap/Td series (1 - Tdap) 6/9/2011 Influenza Age 5 to Adult 8/1/2018 PAP AKA CERVICAL CYTOLOGY 1/11/2019 Allergies as of 5/24/2018  Review Complete On: 5/24/2018 By: Huy Booker MD  
 No Known Allergies Current Immunizations  Never Reviewed Name Date  
 TD Vaccine 6/8/2011 10:48 PM  
  
 Not reviewed this visit You Were Diagnosed With   
  
 Codes Comments Severe recurrent major depression without psychotic features ([facility] 75.)    -  Primary ICD-10-CM: F33.2 ICD-9-CM: 296.33 Vitals BP Pulse Temp Resp Height(growth percentile) Weight(growth percentile) 132/66 96 98.1 °F (36.7 °C) (Oral) 20 5' 9\" (1.753 m) (!) 374 lb (169.6 kg) BMI OB Status Smoking Status 55.23 kg/m2 Unknown Never Smoker Vitals History BMI and BSA Data Body Mass Index Body Surface Area  
 55.23 kg/m 2 2.87 m 2 Preferred Pharmacy Pharmacy Name Phone [facility] [address]. [phone] Your Updated Medication List  
  
   
This list is accurate as of 5/24/18 12:41 PM.  Always use your most recent med list.  
  
  
  
  
 traZODone 100 mg tablet Commonly known as:  DESYREL  
TAKE ONE TABLET BY MOUTH ONCE NIGHTLY  
  
 venlafaxine-SR 75 mg capsule Commonly known as:  EFFEXOR XR Take 1 Cap by mouth daily. Prescriptions Sent to Pharmacy  Refills  
 venlafaxine-SR (EFFEXOR XR) 75 mg capsule 0  
 Sig: Take 1 Cap by mouth daily. Class: Normal  
 Pharmacy: [facility] [place], [address] [place] [address]. Ph #: [phone] Route: Oral  
  
Follow-up Instructions Return in about 3 weeks (around 6/14/2018) for depression. Introducing [facility]! Kristen Cramer introduces [employer] patient portal. Now you can access parts of your medical record, email your doctor's office, and request medication refills online. 1. In your internet browser, go to [email or web address] 2. Click on the First Time User? Click Here link in the Sign In box. You will see the New Member Sign Up page. 3. Enter your [employer] Access Code exactly as it appears below. You will not need to use this code after youve completed the sign-up process. If you do not sign up before the expiration date, you must request a new code. · [employer] Access Code: [email or web address] Expires: 8/22/2018 12:41 PM 
 
4. Enter the last four digits of your Social Security Number (xxxx) and Date of Birth (mm/dd/yyyy) as indicated and click Submit. You will be taken to the next sign-up page. 5. Create a [employer] ID. This will be your [employer] login ID and cannot be changed, so think of one that is secure and easy to remember. 6. Create a [employer] password. You can change your password at any time. 7. Enter your Password Reset Question and Answer. This can be used at a later time if you forget your password. 8. Enter your e-mail address. You will receive e-mail notification when new information is available in [address] 9. Click Sign Up. You can now view and download portions of your medical record. 10. Click the Download Summary menu link to download a portable copy of your medical information. If you have questions, please visit the Frequently Asked Questions section of the [employer] website. Remember, [employer] is NOT to be used for urgent needs. For medical emergencies, dial 911. Now available from your iPhone and Android! Please provide this summary of care documentation to your next provider. Your primary care clinician is listed as Ekta Gerard. If you have any questions after today's visit, please call [phone].

## 2018-05-24 NOTE — PROGRESS NOTES
Chief Complaint   Patient presents with    Depression     follow up      Health Maintenance   Topic Date Due    DTaP/Tdap/Td series (1 - Tdap) 06/09/2011    Influenza Age 5 to Adult  08/01/2018    PAP AKA CERVICAL CYTOLOGY  01/11/2019

## 2019-08-08 ENCOUNTER — TELEPHONE (OUTPATIENT)
Dept: FAMILY MEDICINE CLINIC | Age: 44
End: 2019-08-08

## 2019-08-08 ENCOUNTER — OFFICE VISIT (OUTPATIENT)
Dept: FAMILY MEDICINE CLINIC | Age: 44
End: 2019-08-08

## 2019-08-08 VITALS
SYSTOLIC BLOOD PRESSURE: 134 MMHG | DIASTOLIC BLOOD PRESSURE: 76 MMHG | BODY MASS INDEX: 55.23 KG/M2 | HEART RATE: 82 BPM | HEIGHT: 69 IN | RESPIRATION RATE: 18 BRPM | TEMPERATURE: 98.5 F

## 2019-08-08 DIAGNOSIS — E66.01 MORBID OBESITY (HCC): ICD-10-CM

## 2019-08-08 DIAGNOSIS — F33.2 SEVERE RECURRENT MAJOR DEPRESSION WITHOUT PSYCHOTIC FEATURES (HCC): Primary | ICD-10-CM

## 2019-08-08 DIAGNOSIS — Z13.220 SCREENING FOR LIPID DISORDERS: ICD-10-CM

## 2019-08-08 DIAGNOSIS — R10.2 PELVIC PAIN: ICD-10-CM

## 2019-08-08 DIAGNOSIS — R73.03 PREDIABETES: ICD-10-CM

## 2019-08-08 DIAGNOSIS — R63.5 WEIGHT GAIN: ICD-10-CM

## 2019-08-08 DIAGNOSIS — R39.89 URINE DISCOLORATION: ICD-10-CM

## 2019-08-08 RX ORDER — CITALOPRAM 20 MG/1
20 TABLET, FILM COATED ORAL DAILY
Qty: 30 TAB | Refills: 0 | Status: SHIPPED | OUTPATIENT
Start: 2019-08-08 | End: 2019-09-05
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
## 2019-08-08 NOTE — PATIENT INSTRUCTIONS
Here today to restart depression medication, she reports that she did not tolerate Effexor and she would like to restart Celexa which she has been rationing over the past year or so  I have sent her a prescription for Celexa 20 mg, will likely increase to 40 mg ultimately and she will follow-up with us in 4 to 6 weeks  I strongly believe that she needs to be seen by psychiatry and I have given her the information below to follow-up with the THE [facility] psychiatric services that are provided through their community service Board  With regards to her weight it is very important that she work towards losing weight by eating a healthy diet and exercising regularly  She has a history of prediabetes and she needs labs done because she is having some concerns about urinary changes but also for screening for diabetes and lipid disorders  We will make sure there is no underlying endocrine or metabolic abnormality that is contributing to her mood  She recently got over having a fever for about a week, she suspected it was a diverticular flare but perhaps it could also be a urinary tract infection because of urinary discoloration and so we will check a urine as well  I would asked that she return to our office in 4 to 6 weeks for follow-up on depression and medication titration, I would strongly encourage her to make an appointment with psychiatric services    Mental Health, Drug/Alcohol & [place] ([facility]) offers services to adults who live in THE [facility], have a mental illness, a drug or alcohol problem, or developmental disabilities and who meet specific criteria.  Some of the services we offer include:  Case management - Links and referrals to resources in the community  Counseling - Offer advice and support to help deal with problems and make important decisions  Crisis/emergency services - 24/7 help for people in THE [facility] experiencing a mental health emergency.  Call [phone]  Education - Learn more about parenting and dealing with day-to-day struggles of raising children

## 2019-08-08 NOTE — TELEPHONE ENCOUNTER
Pt stopped by to let Dr Emiliano Lopez know that [address] [place] [address] does not accept her insurance. Encouraged her to find out where she can go and to call us so we can change the lab orders.

## 2019-08-08 NOTE — PROGRESS NOTES
Chief Complaint   Patient presents with    Depression     1. Have you been to the ER, urgent care clinic since your last visit? Hospitalized since your last visit? No    2. Have you seen or consulted any other health care providers outside of the [address] [place] [address] since your last visit? Include any pap smears or colon screening.  No

## 2019-09-03 NOTE — PROGRESS NOTES
HISTORY OF PRESENT ILLNESS  John Mujica is a 45 y.o. female. John Mujica is here to follow up on their depression. He/she is better. Currently, they are taking Effexor which is/are working pretty well. She still has some anxiety issues and is having panic attacks about once or twice a week. Nothing in particular makes her worse. Also, she has been having abdominal cramping associated with her menstrual cycle which has been present for two weeks. She has a history of menometrorrhagia. She sees Gynecology but she cannot afford to go due to her insurance change. Until she can get different insurance (she is looking for another job) she will need to save up for it. Review of Systems   Constitutional: Positive for malaise/fatigue. Negative for weight loss. Weight gain   Respiratory: Negative for shortness of breath. Cardiovascular: Positive for palpitations. Negative for chest pain. Psychiatric/Behavioral: Positive for depression. Negative for hallucinations, substance abuse and suicidal ideas. The patient is nervous/anxious and has insomnia. Visit Vitals  /78   Pulse 83   Temp 97 °F (36.1 °C) (Oral)   Resp 18   Ht 5' 9\" (1.753 m)   Wt (!) 374 lb (169.6 kg)   LMP 08/19/2019 (Approximate)   BMI 55.23 kg/m²     Physical Exam   Constitutional: She is oriented to person, place, and time. She appears well-developed and well-nourished. No distress. Cardiovascular: Normal rate, regular rhythm and normal heart sounds. Exam reveals no gallop and no friction rub. No murmur heard. Pulmonary/Chest: Effort normal and breath sounds normal. No respiratory distress. She has no wheezes. She has no rales. Abdominal: Soft. Normal appearance and bowel sounds are normal. She exhibits no distension. There is no hepatosplenomegaly. There is no tenderness. There is no rebound and no guarding. Neurological: She is alert and oriented to person, place, and time. She displays no tremor. Skin: Skin is warm and dry. She is not diaphoretic. Psychiatric: She has a normal mood and affect. Her behavior is normal. Judgment and thought content normal.   Nursing note and vitals reviewed. ASSESSMENT and PLAN    ICD-10-CM ICD-9-CM    1. Menometrorrhagia N92.1 626.2 HEMOGLOBIN      REFERRAL TO OBSTETRICS AND GYNECOLOGY      medroxyPROGESTERone (PROVERA) 10 mg tablet   2. Anxiety F41.9 300.00 citalopram (CELEXA) 40 mg tablet   3. Recurrent major depressive disorder, remission status unspecified (HCC) F33.9 296.30 citalopram (CELEXA) 40 mg tablet   4. Financial difficulty Z59.8 V60.2         Recurrent menometrorrhagia  Anxiety and depression, need improvement  Difficulty affording care  Check hemoglobin  Gynecology referral, advised to do this as soon as she can  Provera x 10 days  Increase Celexa    Follow-up and Dispositions    · Return in about 6 weeks (around 10/17/2019) for depression, anxiety, menometrorrhagia. Reviewed plan of care. Patient has provided input and agrees with goals.

## 2019-09-05 ENCOUNTER — OFFICE VISIT (OUTPATIENT)
Dept: FAMILY MEDICINE CLINIC | Age: 44
End: 2019-09-05

## 2019-09-05 VITALS
DIASTOLIC BLOOD PRESSURE: 78 MMHG | SYSTOLIC BLOOD PRESSURE: 131 MMHG | HEIGHT: 69 IN | RESPIRATION RATE: 18 BRPM | BODY MASS INDEX: 43.4 KG/M2 | TEMPERATURE: 97 F | HEART RATE: 83 BPM | WEIGHT: 293 LBS

## 2019-09-05 DIAGNOSIS — F33.9 RECURRENT MAJOR DEPRESSIVE DISORDER, REMISSION STATUS UNSPECIFIED (HCC): ICD-10-CM

## 2019-09-05 DIAGNOSIS — Z59.9 FINANCIAL DIFFICULTY: ICD-10-CM

## 2019-09-05 DIAGNOSIS — N92.1 MENOMETRORRHAGIA: Primary | ICD-10-CM

## 2019-09-05 DIAGNOSIS — F41.9 ANXIETY: ICD-10-CM

## 2019-09-05 RX ORDER — MEDROXYPROGESTERONE ACETATE 10 MG/1
10 TABLET ORAL DAILY
Qty: 10 TAB | Refills: 0 | Status: SHIPPED | OUTPATIENT
Start: 2019-09-05 | End: 2019-09-15

## 2019-09-05 RX ORDER — CITALOPRAM 40 MG/1
40 TABLET, FILM COATED ORAL DAILY
Qty: 30 TAB | Refills: 1 | Status: SHIPPED | OUTPATIENT
Start: 2019-09-05 | End: 2019-12-04 | Stop reason: SDUPTHER

## 2019-09-05 SDOH — ECONOMIC STABILITY - INCOME SECURITY: PROBLEM RELATED TO HOUSING AND ECONOMIC CIRCUMSTANCES, UNSPECIFIED: Z59.9

## 2019-09-05 NOTE — PROGRESS NOTES
Chief Complaint   Patient presents with    Depression     meds 4 wk f/u    Abdominal Pain     been on period for 2 wks; discuss provera, cramping pain x 2 wks   Pt started psychiatrist at [facility] and was prescribed Celexa but pt has not started taking it yet    Pt declines to release record of new psychiatrist.    1. Have you been to the ER, urgent care clinic since your last visit? Hospitalized since your last visit? No     2. Have you seen or consulted any other health care providers outside of the [address] [place] [address] since your last visit? Include any pap smears or colon screening.   Yes psychiatrist [facility]

## 2019-09-06 LAB — HGB BLD-MCNC: 11.7 G/DL (ref 11.1–15.9)

## 2019-11-07 ENCOUNTER — HOSPITAL ENCOUNTER (OUTPATIENT)
Dept: LAB | Age: 44
Discharge: HOME | End: 2019-11-07

## 2019-11-07 ENCOUNTER — OFFICE VISIT (OUTPATIENT)
Dept: FAMILY MEDICINE CLINIC | Age: 44
End: 2019-11-07

## 2019-11-07 VITALS
DIASTOLIC BLOOD PRESSURE: 81 MMHG | TEMPERATURE: 98.2 F | HEIGHT: 69 IN | HEART RATE: 88 BPM | RESPIRATION RATE: 20 BRPM | BODY MASS INDEX: 55.23 KG/M2 | SYSTOLIC BLOOD PRESSURE: 122 MMHG

## 2019-11-07 DIAGNOSIS — F41.0 PANIC ATTACKS: ICD-10-CM

## 2019-11-07 DIAGNOSIS — N92.1 MENOMETRORRHAGIA: ICD-10-CM

## 2019-11-07 DIAGNOSIS — F41.9 ANXIETY: ICD-10-CM

## 2019-11-07 DIAGNOSIS — R45.86 MOOD SWINGS: ICD-10-CM

## 2019-11-07 DIAGNOSIS — F33.9 RECURRENT MAJOR DEPRESSIVE DISORDER, REMISSION STATUS UNSPECIFIED (HCC): Primary | ICD-10-CM

## 2019-11-07 LAB
ALBUMIN SERPL-MCNC: 4.1 G/DL (ref 3.5–5)
ALBUMIN/GLOB SERPL: 1 {RATIO} (ref 1.1–2.2)
ALP SERPL-CCNC: 117 U/L (ref 45–117)
ALT SERPL-CCNC: 43 U/L (ref 12–78)
ANION GAP SERPL CALC-SCNC: 6 MMOL/L (ref 5–15)
AST SERPL-CCNC: 21 U/L (ref 15–37)
BASOPHILS # BLD: 0 K/UL (ref 0–0.1)
BASOPHILS NFR BLD: 0 % (ref 0–1)
BILIRUB SERPL-MCNC: 0.4 MG/DL (ref 0.2–1)
BUN SERPL-MCNC: 12 MG/DL (ref 6–20)
BUN/CREAT SERPL: 14 (ref 12–20)
CALCIUM SERPL-MCNC: 9.3 MG/DL (ref 8.5–10.1)
CHLORIDE SERPL-SCNC: 101 MMOL/L (ref 97–108)
CO2 SERPL-SCNC: 28 MMOL/L (ref 21–32)
CREAT SERPL-MCNC: 0.84 MG/DL (ref 0.55–1.02)
DIFFERENTIAL METHOD BLD: ABNORMAL
EOSINOPHIL # BLD: 0.2 K/UL (ref 0–0.4)
EOSINOPHIL NFR BLD: 2 % (ref 0–7)
ERYTHROCYTE [DISTWIDTH] IN BLOOD BY AUTOMATED COUNT: 14.4 % (ref 11.5–14.5)
GLOBULIN SER CALC-MCNC: 4 G/DL (ref 2–4)
GLUCOSE SERPL-MCNC: 237 MG/DL (ref 65–100)
HCT VFR BLD AUTO: 47.3 % (ref 35–47)
HGB BLD-MCNC: 13.9 G/DL (ref 11.5–16)
IMM GRANULOCYTES # BLD AUTO: 0 K/UL (ref 0–0.04)
IMM GRANULOCYTES NFR BLD AUTO: 0 % (ref 0–0.5)
LYMPHOCYTES # BLD: 2.5 K/UL (ref 0.8–3.5)
LYMPHOCYTES NFR BLD: 24 % (ref 12–49)
MCH RBC QN AUTO: 25.7 PG (ref 26–34)
MCHC RBC AUTO-ENTMCNC: 29.4 G/DL (ref 30–36.5)
MCV RBC AUTO: 87.4 FL (ref 80–99)
MONOCYTES # BLD: 0.6 K/UL (ref 0–1)
MONOCYTES NFR BLD: 5 % (ref 5–13)
NEUTS SEG # BLD: 7.1 K/UL (ref 1.8–8)
NEUTS SEG NFR BLD: 69 % (ref 32–75)
NRBC # BLD: 0 K/UL (ref 0–0.01)
NRBC BLD-RTO: 0 PER 100 WBC
PLATELET # BLD AUTO: 262 K/UL (ref 150–400)
PMV BLD AUTO: 11.7 FL (ref 8.9–12.9)
POTASSIUM SERPL-SCNC: 4.4 MMOL/L (ref 3.5–5.1)
PROT SERPL-MCNC: 8.1 G/DL (ref 6.4–8.2)
RBC # BLD AUTO: 5.41 M/UL (ref 3.8–5.2)
SODIUM SERPL-SCNC: 135 MMOL/L (ref 136–145)
WBC # BLD AUTO: 10.4 K/UL (ref 3.6–11)

## 2019-11-07 RX ORDER — DIVALPROEX SODIUM 500 MG/1
500 TABLET, DELAYED RELEASE ORAL
Qty: 30 TAB | Refills: 1 | Status: SHIPPED | OUTPATIENT
Start: 2019-11-07 | End: 2020-09-30

## 2019-11-07 NOTE — PROGRESS NOTES
HISTORY OF PRESENT ILLNESS  Ivy Mclean is a 45 y.o. female. Ivy Mclean is here to follow up on their depression and anxiety. These are chronic problems. He/she is better as far as the depression goes, but the anxiety has gotten worse for no apparent reason. She works for [employer] and can not longer drive, so she has to sit in the passenger seat, which is anxiety provoking. Evidently, her anxiety is fine at home. She plans to go back to school. Currently, they are taking Celexa which is/are working moderately - her depression is a 4/10. She is having panic attacks at least 4 times a week. They wake her up. She is working with a therapist once a week, but she does not think it helps. They have told her she has manic depression. Also, she needs to follow up on her menometrorrhagia. Her hemoglobin was 11.7. I referred her to gyn but has not gone due to her work schedule. She plans to schedule it today. Review of Systems   Constitutional: Positive for malaise/fatigue. Negative for weight loss. Weight gain   Respiratory: Positive for shortness of breath. Cardiovascular: Positive for chest pain and palpitations. Daily, right sided chest pain, lasting a minute or two, not associated with shortness of breath. She sees Cardiology about this. Neurological: Negative for dizziness. Psychiatric/Behavioral: Positive for depression. Negative for hallucinations, substance abuse and suicidal ideas. The patient is nervous/anxious and has insomnia. Visit Vitals  /81   Pulse 88   Temp 98.2 °F (36.8 °C) (Oral)   Resp 20   Ht 5' 9\" (1.753 m)   BMI 55.23 kg/m²     Physical Exam   Constitutional: She is oriented to person, place, and time. She appears well-developed and well-nourished. No distress. Neurological: She is alert and oriented to person, place, and time. She displays no tremor. Skin: She is not diaphoretic.    Psychiatric: She has a normal mood and affect. Her behavior is normal. Judgment and thought content normal.       ASSESSMENT and PLAN    ICD-10-CM ICD-9-CM    1. Recurrent major depressive disorder, remission status unspecified (HCC) F33.9 296.30    2. Anxiety F41.9 300.00    3. Panic attacks F41.0 300.01    4. Mood swings R45.86 296.99 divalproex DR (DEPAKOTE) 500 mg tablet      METABOLIC PANEL, COMPREHENSIVE      CBC WITH AUTOMATED DIFF   5. Menometrorrhagia N92.1 626.2         Depression somewhat better, but still symptomatic  Anxiety worse, having panic attacks  Possible bipolar  Has not follow up on her menometrorrhagia yet  Start Depakote  Labs per orders. Schedule with Gynecology    Follow-up and Dispositions    · Return in about 6 weeks (around 12/19/2019) for depression, anxiety, panic attacks, mood swings. Reviewed plan of care. Patient has provided input and agrees with goals.

## 2019-11-10 DIAGNOSIS — R73.09 ELEVATED GLUCOSE: Primary | ICD-10-CM

## 2019-11-19 ENCOUNTER — HOSPITAL ENCOUNTER (EMERGENCY)
Age: 44
Discharge: HOME | End: 2019-11-20
Attending: EMERGENCY MEDICINE
Payer: COMMERCIAL

## 2019-11-19 DIAGNOSIS — M54.12 CERVICAL RADICULOPATHY: ICD-10-CM

## 2019-11-19 DIAGNOSIS — S16.1XXA STRAIN OF NECK MUSCLE, INITIAL ENCOUNTER: Primary | ICD-10-CM

## 2019-11-19 PROCEDURE — 99285 EMERGENCY DEPT VISIT HI MDM: CPT

## 2019-11-19 NOTE — LETTER
[address] 
[facility] EMERGENCY DEPT 
[address] [place] [address] Sharee [facility] [id number] 
[phone] Work/School Note Date: 11/19/2019 To Whom It May concern: 
 
Chantelle Thomas was seen and treated today in the emergency room by the following provider(s): 
Attending Provider: Sparkle Kendrick MD. Chantelle Thomas should be excused from work on November 20 and November 21, 2019. Sincerely, Zen Sibley MD

## 2019-11-20 ENCOUNTER — APPOINTMENT (OUTPATIENT)
Dept: CT IMAGING | Age: 44
End: 2019-11-20
Attending: EMERGENCY MEDICINE
Payer: COMMERCIAL

## 2019-11-20 ENCOUNTER — APPOINTMENT (OUTPATIENT)
Dept: GENERAL RADIOLOGY | Age: 44
End: 2019-11-20
Attending: PHYSICIAN ASSISTANT
Payer: COMMERCIAL

## 2019-11-20 VITALS
SYSTOLIC BLOOD PRESSURE: 133 MMHG | OXYGEN SATURATION: 95 % | HEIGHT: 69 IN | BODY MASS INDEX: 43.4 KG/M2 | WEIGHT: 293 LBS | RESPIRATION RATE: 17 BRPM | HEART RATE: 83 BPM | DIASTOLIC BLOOD PRESSURE: 75 MMHG | TEMPERATURE: 97.4 F

## 2019-11-20 PROCEDURE — 70450 CT HEAD/BRAIN W/O DYE: CPT

## 2019-11-20 PROCEDURE — 74011250637 HC RX REV CODE- 250/637: Performed by: EMERGENCY MEDICINE

## 2019-11-20 PROCEDURE — 72050 X-RAY EXAM NECK SPINE 4/5VWS: CPT

## 2019-11-20 RX ORDER — FENTANYL CITRATE 50 UG/ML
INJECTION, SOLUTION INTRAMUSCULAR; INTRAVENOUS
Status: DISCONTINUED
Start: 2019-11-20 | End: 2019-11-20 | Stop reason: HOSPADM

## 2019-11-20 RX ORDER — CYCLOBENZAPRINE HCL 10 MG
10 TABLET ORAL
Qty: 15 TAB | Refills: 0 | Status: SHIPPED | OUTPATIENT
Start: 2019-11-20 | End: 2020-09-30

## 2019-11-20 RX ORDER — DICLOFENAC POTASSIUM 50 MG/1
50 TABLET, FILM COATED ORAL 3 TIMES DAILY
Qty: 15 TAB | Refills: 0 | Status: SHIPPED | OUTPATIENT
Start: 2019-11-20 | End: 2020-09-30

## 2019-11-20 RX ORDER — METHOCARBAMOL 500 MG/1
500 TABLET, FILM COATED ORAL 4 TIMES DAILY
Qty: 20 TAB | Refills: 0 | OUTPATIENT
Start: 2019-11-20 | End: 2019-11-20

## 2019-11-20 RX ORDER — DIAZEPAM 5 MG/1
5 TABLET ORAL
Status: COMPLETED | OUTPATIENT
Start: 2019-11-20 | End: 2019-11-20

## 2019-11-20 RX ORDER — METHYLPREDNISOLONE 4 MG/1
TABLET ORAL
Qty: 1 DOSE PACK | Refills: 0 | Status: SHIPPED | OUTPATIENT
Start: 2019-11-20 | End: 2020-09-30

## 2019-11-20 RX ORDER — TRAMADOL HYDROCHLORIDE 50 MG/1
50 TABLET ORAL
Status: COMPLETED | OUTPATIENT
Start: 2019-11-20 | End: 2019-11-20

## 2019-11-20 RX ORDER — HYDROCODONE BITARTRATE AND ACETAMINOPHEN 5; 325 MG/1; MG/1
1 TABLET ORAL
Qty: 12 TAB | Refills: 0 | Status: SHIPPED | OUTPATIENT
Start: 2019-11-20 | End: 2019-11-23

## 2019-11-20 RX ADMIN — DIAZEPAM 5 MG: 5 TABLET ORAL at 00:57

## 2019-11-20 RX ADMIN — TRAMADOL HYDROCHLORIDE 50 MG: 50 TABLET, FILM COATED ORAL at 00:57

## 2019-11-20 NOTE — ED NOTES
Dr. Aguila Markham has reviewed discharge instructions with the patient. The patient verbalized understanding. Patient ambulated to waiting room in no distress.
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
## 2019-11-20 NOTE — DISCHARGE INSTRUCTIONS
Patient Education      Patient Education        Pinched Nerve in the Neck: Care Instructions  Your Care Instructions  A pinched nerve in the neck happens when a vertebra or disc in the upper part of your spine is damaged. This damage can happen because of an injury. Or it can just happen with age. The changes caused by the damage may put pressure on a nearby nerve root, pinching it. This causes symptoms such as sharp pain in your neck, shoulder, arm, hand, or back. You may also have tingling or numbness. Sometimes it makes your arm weaker. The symptoms are usually worse when you turn your head or strain your neck. For many people, the symptoms get better over time and finally go away. Early treatment usually includes medicines for pain and swelling. Sometimes physical therapy and special exercises may help. Follow-up care is a key part of your treatment and safety. Be sure to make and go to all appointments, and call your doctor if you are having problems. It's also a good idea to know your test results and keep a list of the medicines you take. How can you care for yourself at home? · Be safe with medicines. Read and follow all instructions on the label. ? If the doctor gave you a prescription medicine for pain, take it as prescribed. ? If you are not taking a prescription pain medicine, ask your doctor if you can take an over-the-counter medicine. · Try using a heating pad on a low or medium setting for 15 to 20 minutes every 2 or 3 hours. Try a warm shower in place of one session with the heating pad. You can also buy single-use heat wraps that last up to 8 hours. · You can also try an ice pack for 10 to 15 minutes every 2 to 3 hours. There isn't strong evidence that either heat or ice will help. But you can try them to see if they help you. · Don't spend too long in one position. Take short breaks to move around and change positions.   · Wear a seat belt and shoulder harness when you are in a car.  · Sleep with a pillow under your head and neck that keeps your neck straight. · If you were given a neck brace (cervical collar) to limit neck motion, wear it as instructed for as many days as your doctor tells you to. Do not wear it longer than you were told to. Wearing a brace for too long can lead to neck stiffness and can weaken the neck muscles. · Follow your doctor's instructions for gentle neck-stretching exercises. · Do not smoke. Smoking can slow healing of your discs. If you need help quitting, talk to your doctor about stop-smoking programs and medicines. These can increase your chances of quitting for good. · Avoid strenuous work or exercise until your doctor says it is okay. When should you call for help? Call 911 anytime you think you may need emergency care. For example, call if:    · You are unable to move an arm or a leg at all.   [facility] your doctor now or seek immediate medical care if:    · You have new or worse symptoms in your arms, legs, chest, belly, or buttocks. Symptoms may include:  ? Numbness or tingling. ? Weakness. ? Pain.     · You lose bladder or bowel control.    Watch closely for changes in your health, and be sure to contact your doctor if:    · You are not getting better as expected. Where can you learn more? Go to http://arsh-gosia.info/. Enter B279 in the search box to learn more about \"Pinched Nerve in the Neck: Care Instructions. \"  Current as of: June 26, 2019  Content Version: 12.2  © 5639-0387 "[employer]. Care instructions adapted under license by [employer] (which disclaims liability or warranty for this information). If you have questions about a medical condition or this instruction, always ask your healthcare professional. [place] [address] any warranty or liability for your use of this information.          Neck Strain: Care Instructions  Your Care Instructions    You have strained the muscles and ligaments in your neck. A sudden, awkward movement can strain the neck. This often occurs with falls or car accidents or during certain sports. Everyday activities like working on a computer or sleeping can also cause neck strain if they force you to hold your neck in an awkward position for a long time. It is common for neck pain to get worse for a day or two after an injury, but it should start to feel better after that. You may have more pain and stiffness for several days before it gets better. This is expected. It may take a few weeks or longer for it to heal completely. Good home treatment can help you get better faster and avoid future neck problems. Follow-up care is a key part of your treatment and safety. Be sure to make and go to all appointments, and call your doctor if you are having problems. It's also a good idea to know your test results and keep a list of the medicines you take. How can you care for yourself at home? · If you were given a neck brace (cervical collar) to limit neck motion, wear it as instructed for as many days as your doctor tells you to. Do not wear it longer than you were told to. Wearing a brace for too long can make neck stiffness worse and weaken the neck muscles. · You can try using heat or ice to see if it helps. ? Try using a heating pad on a low or medium setting for 15 to 20 minutes every 2 to 3 hours. Try a warm shower in place of one session with the heating pad. You can also buy single-use heat wraps that last up to 8 hours. ? You can also try an ice pack for 10 to 15 minutes every 2 to 3 hours. · Take pain medicines exactly as directed. ? If the doctor gave you a prescription medicine for pain, take it as prescribed. ? If you are not taking a prescription pain medicine, ask your doctor if you can take an over-the-counter medicine. · Gently rub the area to relieve pain and help with blood flow. Do not massage the area if it hurts to do so.   · Do not do anything that makes the pain worse. Take it easy for a couple of days. You can do your usual activities if they do not hurt your neck or put it at risk for more stress or injury. · Try sleeping on a special neck pillow. Place it under your neck, not under your head. Placing a tightly rolled-up towel under your neck while you sleep will also work. If you use a neck pillow or rolled towel, do not use your regular pillow at the same time. · To prevent future neck pain, do exercises to stretch and strengthen your neck and back. Learn how to use good posture, safe lifting techniques, and proper body mechanics. When should you call for help? Call 911 anytime you think you may need emergency care. For example, call if:    · You are unable to move an arm or a leg at all.   [facility] your doctor now or seek immediate medical care if:    · You have new or worse symptoms in your arms, legs, chest, belly, or buttocks. Symptoms may include:  ? Numbness or tingling. ? Weakness. ? Pain.     · You lose bladder or bowel control.    Watch closely for changes in your health, and be sure to contact your doctor if:    · You are not getting better as expected. Where can you learn more? Go to http://arsh-gosia.info/. Enter M253 in the search box to learn more about \"Neck Strain: Care Instructions. \"  Current as of: June 26, 2019  Content Version: 12.2  © 5298-3686 [employer]. Care instructions adapted under license by [employer] (which disclaims liability or warranty for this information). If you have questions about a medical condition or this instruction, always ask your healthcare professional. [place] [address] any warranty or liability for your use of this information.

## 2019-12-03 ENCOUNTER — TELEPHONE (OUTPATIENT)
Dept: FAMILY MEDICINE CLINIC | Age: 44
End: 2019-12-03

## 2019-12-03 DIAGNOSIS — F33.9 RECURRENT MAJOR DEPRESSIVE DISORDER, REMISSION STATUS UNSPECIFIED (HCC): ICD-10-CM

## 2019-12-03 DIAGNOSIS — F41.9 ANXIETY: ICD-10-CM

## 2019-12-03 NOTE — TELEPHONE ENCOUNTER
Pt asking if she is to continue taking the celexa now that she is on depakote? If so, she is completely out of it. Please call [phone]    Verified Vida Collins in [place].

## 2019-12-04 RX ORDER — CITALOPRAM 40 MG/1
40 TABLET, FILM COATED ORAL DAILY
Qty: 30 TAB | Refills: 1 | Status: SHIPPED | OUTPATIENT
Start: 2019-12-04 | End: 2020-09-30

## 2019-12-04 NOTE — TELEPHONE ENCOUNTER
Called and spoke with pt, and she has been advised and states understanding that she does need to continue on the Celexa RX. Please send refill to pt's pharmacy.
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
## 2020-01-03 ENCOUNTER — OFFICE VISIT (OUTPATIENT)
Dept: OBGYN CLINIC | Age: 45
End: 2020-01-03

## 2020-01-03 VITALS — BODY MASS INDEX: 56.12 KG/M2 | WEIGHT: 293 LBS | DIASTOLIC BLOOD PRESSURE: 79 MMHG | SYSTOLIC BLOOD PRESSURE: 135 MMHG

## 2020-01-03 DIAGNOSIS — Z87.42 HISTORY OF ABNORMAL CERVICAL PAP SMEAR: ICD-10-CM

## 2020-01-03 DIAGNOSIS — Z01.419 ENCOUNTER FOR GYNECOLOGICAL EXAMINATION: Primary | ICD-10-CM

## 2020-01-03 DIAGNOSIS — Z87.42 HISTORY OF IRREGULAR MENSTRUAL BLEEDING: ICD-10-CM

## 2020-01-03 RX ORDER — MEDROXYPROGESTERONE ACETATE 10 MG/1
10 TABLET ORAL DAILY
Qty: 30 TAB | Refills: 3 | Status: SHIPPED | OUTPATIENT
Start: 2020-01-03 | End: 2020-01-13

## 2020-01-08 LAB
CYTOLOGIST CVX/VAG CYTO: NORMAL
CYTOLOGY CVX/VAG DOC CYTO: NORMAL
CYTOLOGY CVX/VAG DOC THIN PREP: NORMAL
CYTOLOGY HISTORY:: NORMAL
DX ICD CODE: NORMAL
HPV I/H RISK 1 DNA CVX QL PROBE+SIG AMP: NEGATIVE
Lab: NORMAL
OTHER STN SPEC: NORMAL
STAT OF ADQ CVX/VAG CYTO-IMP: NORMAL

## 2020-03-04 NOTE — TELEPHONE ENCOUNTER
----- Message from Agusto Lewis MD sent at 1/5/2017  3:38 PM EST -----  Hormone levels normal, not menopausal.  No urine culture results available. Notify pt. If she is interested in hysterectomy, refer to Dr. Enedelia De Leon.  If she wants LEEP and IUD, can schedule. COPD/PN Week 2 Survey      Responses   [facility] patient discharged from?  [place]   Does the patient have one of the following disease processes/diagnoses(primary or secondary)?  COPD/Pneumonia   Was the primary reason for admission:  Pneumonia   Week 2 attempt successful?  Yes   Call start time  1212   Call end time  1220   Discharge diagnosis  CAP (community acquired  pneumonia)   Is patient permission given to speak with other caregiver?  Yes   List who call center can speak with  Aiyana solorio   Person spoke with today (if not patient) and relationship  daughterAiyana   Meds reviewed with patient/caregiver?  Yes   Is the patient having any side effects they believe may be caused by any medication additions or changes?  No   Does the patient have all medications ordered at discharge?  Yes   Is the patient taking all medications as directed (includes completed medication regime)?  Yes   Does the patient have a primary care provider?   Yes   Does the patient have an appointment with their PCP or pulmonologist within 7 days of discharge?  Yes   Comments regarding PCP  Everette Rubio MD PCP   Has the patient kept scheduled appointments due by today?  N/A   What is the Home health agency?   [facility]     Has home health visited the patient within 72 hours of discharge?  Yes   Psychosocial issues?  No   Did the patient receive a copy of their discharge instructions?  Yes   Nursing interventions  Reviewed instructions with patient   What is the patient's perception of their health status since discharge?  Improving   Is the patient/caregiver able to teach back the hierarchy of who to call/visit for symptoms/problems? PCP, Specialist, Home health nurse, Urgent Care, ED, 911  Yes   Is the patient/caregiver able to teach back signs and symptoms of worsening condition:  Fever/chills, Shortness of breath, Chest pain   Is the patient/caregiver able to teach back importance of completing antibiotic course of  treatment?  Yes   Week 2 call completed?  Yes          Rosalee Ritchie RN

## 2020-04-20 ENCOUNTER — TELEPHONE (OUTPATIENT)
Dept: FAMILY MEDICINE CLINIC | Age: 45
End: 2020-04-20

## 2020-04-20 NOTE — TELEPHONE ENCOUNTER
----- Message from Tricia Huff Page sent at 2020 10:28 AM EDT -----  Regarding: Dr. Landry Chester Telephone  Appointment not available:      : 1975     ID numbers: #[id number]    Caller's first and last name and relationship to patient (if not the patient):   Best contact number: [phone]  Preferred date and time:  First available  Scheduled appointment date and time:  n/a  Reason for appointment:    Hospital follow up  Details to clarify the request:  Patient ws dc from [facility] with an extremely High A1C.  Advised to follow up with pcp,

## 2020-04-20 NOTE — TELEPHONE ENCOUNTER
Called and spoke with pt, and she has been scheduled for 04/22/20. Request for records have been faxed.

## 2020-04-22 ENCOUNTER — VIRTUAL VISIT (OUTPATIENT)
Dept: FAMILY MEDICINE CLINIC | Age: 45
End: 2020-04-22

## 2020-04-22 ENCOUNTER — TELEPHONE (OUTPATIENT)
Dept: FAMILY MEDICINE CLINIC | Age: 45
End: 2020-04-22

## 2020-04-22 VITALS — HEIGHT: 69 IN | BODY MASS INDEX: 43.4 KG/M2 | WEIGHT: 293 LBS

## 2020-04-22 DIAGNOSIS — N83.202 CYST OF LEFT OVARY: ICD-10-CM

## 2020-04-22 DIAGNOSIS — E11.9 TYPE 2 DIABETES MELLITUS WITHOUT COMPLICATION, WITHOUT LONG-TERM CURRENT USE OF INSULIN (HCC): Primary | ICD-10-CM

## 2020-04-22 RX ORDER — METFORMIN HYDROCHLORIDE 500 MG/1
500 TABLET ORAL 2 TIMES DAILY WITH MEALS
Qty: 120 TAB | Refills: 0 | Status: SHIPPED | OUTPATIENT
Start: 2020-04-22 | End: 2020-12-10

## 2020-04-22 RX ORDER — METRONIDAZOLE 500 MG/1
500 TABLET ORAL 2 TIMES DAILY
COMMUNITY
End: 2020-09-30

## 2020-04-22 NOTE — LETTER
NOTIFICATION RETURN TO WORK / SCHOOL 
 
4/22/2020 11:53 AM 
 
Ms. Maria Alejandra Sesay [facility] [address] [place] [address]. [address] [id number] To Whom It May Concern: 
 
Maria Alejandra Sesay is currently under the care of [address] [place] [address]. She will return to work/school on: 4/24/20. If there are questions or concerns please have the patient contact our office.  
 
 
 
Sincerely, 
 
 
Anabella Thomas MD

## 2020-04-22 NOTE — PROGRESS NOTES
Rakesh Santos is a 39 y.o. female who was seen by synchronous (real-time) audio-video technology on 4/22/2020. Assessment & Plan:   Diagnoses and all orders for this visit:    1. Type 2 diabetes mellitus without complication, without long-term current use of insulin (HCC)  -     metFORMIN (GLUCOPHAGE) 500 mg tablet; Take 1 Tab by mouth two (2) times daily (with meals). For 2 weeks, then increase to 2 BID.  -     REFERRAL TO DIABETIC EDUCATION; Standing    2. Cyst of left ovary  -     REFERRAL TO OBSTETRICS AND GYNECOLOGY        Newly diagnosed diabetes  Ovarian cyst, abdominal pain now minimal  Start metformin  Diabetic ed  Weight loss  She is going to call her gyn to see what can be worked out so she can have her hospital follow up  Discharge summary requested    Follow-up and Dispositions    · Return in about 6 weeks (around 6/3/2020) for diabetes. Reviewed plan of care. Patient has provided input and agrees with goals. CPT Codes 40600-66058 for Established Patients may apply to this Telehealth Visit      Subjective:   Rakesh Santos was seen for Diabetes (elevated A1C in hospital - )      Rakesh Santos is here for diabetic follow up. Evidently, she was hospitalized for due to LLQ abdominal pain and a cyst on her left ovary,  Also, she had a UTI. She was diagnosed with diabetes during her hospital stay. No diabetic education and no medication was started. Her A1C was 10.9. Their FBS have been around up to 202, usually 160 to 170. This is a chronic problem which has not changed. It is improved by nothing. She is to follow up with gyn, but she owes them money and has none. Her pain is still present, but minor. Unfortunately, I have no discharge summary. Prior to Admission medications    Medication Sig Start Date End Date Taking? Authorizing Provider   metroNIDAZOLE (FLAGYL) 500 mg tablet Take 500 mg by mouth two (2) times a day.    Yes Provider, Historical citalopram (CELEXA) 40 mg tablet Take 1 Tab by mouth daily. 12/4/19   Israel Lucio MD   diclofenac potassium (CATAFLAM) 50 mg tablet Take 1 Tab by mouth three (3) times daily. 11/20/19   Kaia Galarza MD   cyclobenzaprine (FLEXERIL) 10 mg tablet Take 1 Tab by mouth three (3) times daily as needed for Muscle Spasm(s). 11/20/19   Kaia Galarza MD   methylPREDNISolone (MEDROL, NANCI,) 4 mg tablet As directed 11/20/19   Kaia Galarza MD   divalproex DR (DEPAKOTE) 500 mg tablet Take 1 Tab by mouth nightly. 11/7/19   Israel Lucio MD     No Known Allergies      Review of Systems   Constitutional: Negative for weight loss. No weight gain   Eyes: Negative for blurred vision. Respiratory: Negative for shortness of breath. Cardiovascular: Negative for chest pain and leg swelling. Gastrointestinal: Positive for abdominal pain. Genitourinary:        Polyuria   Neurological: Positive for headaches. Negative for dizziness, sensory change, speech change and focal weakness. Endo/Heme/Allergies: Positive for polydipsia. Objective:     Visit Vitals  Ht 5' 9\" (1.753 m)   Wt (!) 380 lb (172.4 kg)   BMI 56.12 kg/m²       Physical Exam  Constitutional:       General: She is not in acute distress. Appearance: Normal appearance. Neurological:      Mental Status: She is alert and oriented to person, place, and time. Due to this being a TeleHealth evaluation, many elements of the physical examination are unable to be assessed. We discussed the expected course, resolution and complications of the diagnosis(es) in detail. Medication risks, benefits, costs, interactions, and alternatives were discussed as indicated. I advised her to contact the office if her condition worsens, changes or fails to improve as anticipated. She expressed understanding with the diagnosis(es) and plan.          Pursuant to the emergency declaration under the [facility] [place], [facility] authority and the Coronavirus Preparedness and [employer] Act, this Virtual  Visit was conducted, with patient's consent, to reduce the patient's risk of exposure to COVID-19 and provide continuity of care for an established patient. Services were provided through a video synchronous discussion virtually to substitute for in-person clinic visit.     Candace Vazquez MD

## 2020-04-22 NOTE — PROGRESS NOTES
Pt is completely virtual appointment at [place] [facility] [place]. Chief Complaint   Patient presents with    Diabetes     elevated A1C in hospital -      Pt's blood sugar this morning was 202.

## 2020-09-30 ENCOUNTER — HOSPITAL ENCOUNTER (EMERGENCY)
Age: 45
Discharge: HOME | End: 2020-09-30
Attending: EMERGENCY MEDICINE
Payer: MEDICAID

## 2020-09-30 ENCOUNTER — APPOINTMENT (OUTPATIENT)
Dept: CT IMAGING | Age: 45
End: 2020-09-30
Attending: EMERGENCY MEDICINE
Payer: MEDICAID

## 2020-09-30 VITALS
BODY MASS INDEX: 43.4 KG/M2 | TEMPERATURE: 99.9 F | DIASTOLIC BLOOD PRESSURE: 105 MMHG | HEIGHT: 69 IN | WEIGHT: 293 LBS | HEART RATE: 126 BPM | SYSTOLIC BLOOD PRESSURE: 125 MMHG | OXYGEN SATURATION: 98 % | RESPIRATION RATE: 18 BRPM

## 2020-09-30 DIAGNOSIS — K52.9 GASTROENTERITIS: Primary | ICD-10-CM

## 2020-09-30 LAB
ALBUMIN SERPL-MCNC: 3.2 G/DL (ref 3.5–5)
ALBUMIN/GLOB SERPL: 0.7 {RATIO} (ref 1.1–2.2)
ALP SERPL-CCNC: 85 U/L (ref 45–117)
ALT SERPL-CCNC: 49 U/L (ref 12–78)
ANION GAP SERPL CALC-SCNC: 10 MMOL/L (ref 5–15)
APPEARANCE UR: ABNORMAL
AST SERPL-CCNC: 36 U/L (ref 15–37)
BACTERIA URNS QL MICRO: NEGATIVE /HPF
BASOPHILS # BLD: 0 K/UL (ref 0–0.1)
BASOPHILS NFR BLD: 0 % (ref 0–1)
BILIRUB SERPL-MCNC: 1.1 MG/DL (ref 0.2–1)
BILIRUB UR QL: NEGATIVE
BUN SERPL-MCNC: 11 MG/DL (ref 6–20)
BUN/CREAT SERPL: 12 (ref 12–20)
CALCIUM SERPL-MCNC: 8.8 MG/DL (ref 8.5–10.1)
CHLORIDE SERPL-SCNC: 97 MMOL/L (ref 97–108)
CO2 SERPL-SCNC: 26 MMOL/L (ref 21–32)
COLOR UR: ABNORMAL
COMMENT, HOLDF: NORMAL
CREAT SERPL-MCNC: 0.95 MG/DL (ref 0.55–1.02)
DIFFERENTIAL METHOD BLD: ABNORMAL
EOSINOPHIL # BLD: 0.1 K/UL (ref 0–0.4)
EOSINOPHIL NFR BLD: 1 % (ref 0–7)
EPITH CASTS URNS QL MICRO: ABNORMAL /LPF
ERYTHROCYTE [DISTWIDTH] IN BLOOD BY AUTOMATED COUNT: 14 % (ref 11.5–14.5)
GLOBULIN SER CALC-MCNC: 4.4 G/DL (ref 2–4)
GLUCOSE SERPL-MCNC: 300 MG/DL (ref 65–100)
GLUCOSE UR STRIP.AUTO-MCNC: >1000 MG/DL
HCG UR QL: NEGATIVE
HCT VFR BLD AUTO: 41.8 % (ref 35–47)
HGB BLD-MCNC: 13.5 G/DL (ref 11.5–16)
HGB UR QL STRIP: ABNORMAL
IMM GRANULOCYTES # BLD AUTO: 0.1 K/UL (ref 0–0.04)
IMM GRANULOCYTES NFR BLD AUTO: 0 % (ref 0–0.5)
KETONES UR QL STRIP.AUTO: ABNORMAL MG/DL
LACTATE SERPL-SCNC: 1.8 MMOL/L (ref 0.4–2)
LEUKOCYTE ESTERASE UR QL STRIP.AUTO: NEGATIVE
LIPASE SERPL-CCNC: 42 U/L (ref 73–393)
LYMPHOCYTES # BLD: 1.7 K/UL (ref 0.8–3.5)
LYMPHOCYTES NFR BLD: 10 % (ref 12–49)
MCH RBC QN AUTO: 27.8 PG (ref 26–34)
MCHC RBC AUTO-ENTMCNC: 32.3 G/DL (ref 30–36.5)
MCV RBC AUTO: 86.2 FL (ref 80–99)
MONOCYTES # BLD: 0.7 K/UL (ref 0–1)
MONOCYTES NFR BLD: 5 % (ref 5–13)
NEUTS SEG # BLD: 13.4 K/UL (ref 1.8–8)
NEUTS SEG NFR BLD: 84 % (ref 32–75)
NITRITE UR QL STRIP.AUTO: NEGATIVE
NRBC # BLD: 0 K/UL (ref 0–0.01)
NRBC BLD-RTO: 0 PER 100 WBC
PH UR STRIP: 6 [PH] (ref 5–8)
PLATELET # BLD AUTO: 185 K/UL (ref 150–400)
PMV BLD AUTO: 11.5 FL (ref 8.9–12.9)
POTASSIUM SERPL-SCNC: 3.8 MMOL/L (ref 3.5–5.1)
PROT SERPL-MCNC: 7.6 G/DL (ref 6.4–8.2)
PROT UR STRIP-MCNC: 100 MG/DL
RBC # BLD AUTO: 4.85 M/UL (ref 3.8–5.2)
RBC #/AREA URNS HPF: >100 /HPF (ref 0–5)
SAMPLES BEING HELD,HOLD: NORMAL
SODIUM SERPL-SCNC: 133 MMOL/L (ref 136–145)
SP GR UR REFRACTOMETRY: 1.02 (ref 1–1.03)
UR CULT HOLD, URHOLD: NORMAL
UROBILINOGEN UR QL STRIP.AUTO: 0.2 EU/DL (ref 0.2–1)
WBC # BLD AUTO: 16.1 K/UL (ref 3.6–11)
WBC URNS QL MICRO: ABNORMAL /HPF (ref 0–4)

## 2020-09-30 PROCEDURE — 99285 EMERGENCY DEPT VISIT HI MDM: CPT

## 2020-09-30 PROCEDURE — 83605 ASSAY OF LACTIC ACID: CPT

## 2020-09-30 PROCEDURE — 36415 COLL VENOUS BLD VENIPUNCTURE: CPT

## 2020-09-30 PROCEDURE — 96374 THER/PROPH/DIAG INJ IV PUSH: CPT

## 2020-09-30 PROCEDURE — 74011000636 HC RX REV CODE- 636: Performed by: EMERGENCY MEDICINE

## 2020-09-30 PROCEDURE — 83690 ASSAY OF LIPASE: CPT

## 2020-09-30 PROCEDURE — 93005 ELECTROCARDIOGRAM TRACING: CPT

## 2020-09-30 PROCEDURE — 85025 COMPLETE CBC W/AUTO DIFF WBC: CPT

## 2020-09-30 PROCEDURE — 81001 URINALYSIS AUTO W/SCOPE: CPT

## 2020-09-30 PROCEDURE — 80053 COMPREHEN METABOLIC PANEL: CPT

## 2020-09-30 PROCEDURE — 96375 TX/PRO/DX INJ NEW DRUG ADDON: CPT

## 2020-09-30 PROCEDURE — 87040 BLOOD CULTURE FOR BACTERIA: CPT

## 2020-09-30 PROCEDURE — 74011250636 HC RX REV CODE- 250/636: Performed by: EMERGENCY MEDICINE

## 2020-09-30 PROCEDURE — 74177 CT ABD & PELVIS W/CONTRAST: CPT

## 2020-09-30 PROCEDURE — 81025 URINE PREGNANCY TEST: CPT

## 2020-09-30 RX ORDER — SODIUM CHLORIDE 0.9 % (FLUSH) 0.9 %
10 SYRINGE (ML) INJECTION ONCE
Status: COMPLETED | OUTPATIENT
Start: 2020-09-30 | End: 2020-09-30

## 2020-09-30 RX ORDER — METRONIDAZOLE 500 MG/1
500 TABLET ORAL 3 TIMES DAILY
Qty: 30 TAB | Refills: 0 | Status: SHIPPED | OUTPATIENT
Start: 2020-09-30 | End: 2020-10-10

## 2020-09-30 RX ORDER — SODIUM CHLORIDE 9 MG/ML
50 INJECTION, SOLUTION INTRAVENOUS ONCE
Status: COMPLETED | OUTPATIENT
Start: 2020-09-30 | End: 2020-09-30

## 2020-09-30 RX ORDER — ONDANSETRON 4 MG/1
4 TABLET, ORALLY DISINTEGRATING ORAL
Qty: 10 TAB | Refills: 0 | Status: SHIPPED | OUTPATIENT
Start: 2020-09-30 | End: 2020-12-10

## 2020-09-30 RX ORDER — KETOROLAC TROMETHAMINE 30 MG/ML
15 INJECTION, SOLUTION INTRAMUSCULAR; INTRAVENOUS
Status: COMPLETED | OUTPATIENT
Start: 2020-09-30 | End: 2020-09-30

## 2020-09-30 RX ORDER — CIPROFLOXACIN 500 MG/1
500 TABLET ORAL 2 TIMES DAILY
Qty: 20 TAB | Refills: 0 | Status: SHIPPED | OUTPATIENT
Start: 2020-09-30 | End: 2020-10-10

## 2020-09-30 RX ORDER — ONDANSETRON 2 MG/ML
4 INJECTION INTRAMUSCULAR; INTRAVENOUS
Status: COMPLETED | OUTPATIENT
Start: 2020-09-30 | End: 2020-09-30

## 2020-09-30 RX ADMIN — SODIUM CHLORIDE 50 ML/HR: 900 INJECTION, SOLUTION INTRAVENOUS at 17:05

## 2020-09-30 RX ADMIN — Medication 10 ML: at 17:05

## 2020-09-30 RX ADMIN — ONDANSETRON 4 MG: 2 INJECTION INTRAMUSCULAR; INTRAVENOUS at 15:57

## 2020-09-30 RX ADMIN — IOPAMIDOL 100 ML: 755 INJECTION, SOLUTION INTRAVENOUS at 17:03

## 2020-09-30 RX ADMIN — KETOROLAC TROMETHAMINE 15 MG: 30 INJECTION, SOLUTION INTRAMUSCULAR at 15:57

## 2020-09-30 RX ADMIN — SODIUM CHLORIDE 1000 ML: 900 INJECTION, SOLUTION INTRAVENOUS at 15:57

## 2020-09-30 RX ADMIN — SODIUM CHLORIDE 1000 ML: 900 INJECTION, SOLUTION INTRAVENOUS at 17:18

## 2020-09-30 NOTE — ED TRIAGE NOTES
Pt arrives ambulatory to ed with complaints of mid epigastric pain worse with movement x three days and nausea since today. Pt also states intermittent fevers. Hx of diverticulitis. Recently dx with diabetes. Aleve at 0600.
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
## 2020-09-30 NOTE — LETTER
[facility] EMERGENCY [address] [facility] [id number] 
[phone] Work/School Note Date: 9/30/2020 To Whom It May concern: 
 
Carlos Kennedy was seen and treated today in the emergency room by the following provider(s): 
No providers found. Carlos Kennedy may return to work on 10/1/2020.  
 
Sincerely, 
 
 
 
 
Shimon Chung MD

## 2020-09-30 NOTE — DISCHARGE INSTRUCTIONS
Patient Education        Gastroenteritis: Care Instructions  Your Care Instructions     Gastroenteritis is an illness that may cause nausea, vomiting, and diarrhea. It is sometimes called \"stomach flu. \" It can be caused by bacteria or a virus. You will probably begin to feel better in 1 to 2 days. In the meantime, get plenty of rest and make sure you do not become dehydrated. Dehydration occurs when your body loses too much fluid. Follow-up care is a key part of your treatment and safety. Be sure to make and go to all appointments, and call your doctor if you are having problems. It's also a good idea to know your test results and keep a list of the medicines you take. How can you care for yourself at home? · If your doctor prescribed antibiotics, take them as directed. Do not stop taking them just because you feel better. You need to take the full course of antibiotics. · Drink plenty of fluids to prevent dehydration, enough so that your urine is light yellow or clear like water. Choose water and other caffeine-free clear liquids until you feel better. If you have kidney, heart, or liver disease and have to limit fluids, talk with your doctor before you increase your fluid intake. · Drink fluids slowly, in frequent, small amounts, because drinking too much too fast can cause vomiting. · Begin eating mild foods, such as dry toast, yogurt, applesauce, bananas, and rice. Avoid spicy, hot, or high-fat foods, and do not drink alcohol or caffeine for a day or two. Do not drink milk or eat ice cream until you are feeling better. How to prevent gastroenteritis  · Keep hot foods hot and cold foods cold. · Do not eat meats, dressings, salads, or other foods that have been kept at room temperature for more than 2 hours. · Use a thermometer to check your refrigerator. It should be between 34°F and 40°F.  · Defrost meats in the refrigerator or microwave, not on the kitchen counter.   · Keep your hands and your kitchen clean. Wash your hands, cutting boards, and countertops with hot soapy water frequently. · Cook meat until it is well done. · Do not eat raw eggs or uncooked sauces made with raw eggs. · Do not take chances. If food looks or tastes spoiled, throw it out. When should you call for help? Call 911 anytime you think you may need emergency care. For example, call if:    · You vomit blood or what looks like coffee grounds.     · You passed out (lost consciousness).     · You pass maroon or very bloody stools. Call your doctor now or seek immediate medical care if:    · You have severe belly pain.     · You have signs of needing more fluids. You have sunken eyes, a dry mouth, and pass only a little dark urine.     · You feel like you are going to faint.     · You have increased belly pain that does not go away in 1 to 2 days.     · You have new or increased nausea, or you are vomiting.     · You have a new or higher fever.     · Your stools are black and tarlike or have streaks of blood. Watch closely for changes in your health, and be sure to contact your doctor if:    · You are dizzy or lightheaded.     · You urinate less than usual, or your urine is dark yellow or brown.     · You do not feel better with each day that goes by. Where can you learn more? Go to http://www.wells.com/  Enter N142 in the search box to learn more about \"Gastroenteritis: Care Instructions. \"  Current as of: February 11, 2020               Content Version: 12.6  © 8310-2213 Healthwise, Incorporated. Care instructions adapted under license by [employer] (which disclaims liability or warranty for this information). If you have questions about a medical condition or this instruction, always ask your healthcare professional. [place] [address] any warranty or liability for your use of this information.          Patient Education     Colitis: Care Instructions  Your Care Instructions  Colitis is the medical term for swelling (inflammation) of the intestine. It can be caused by different things, such as an infection or loss of blood flow in the intestine. Other causes are problems like Crohn's disease or ulcerative colitis. Symptoms may include fever, diarrhea that may be bloody, or belly pain. Sometimes symptoms go away without treatment. But you may need treatment or more tests, such as blood tests or a stool test. Or you may need imaging tests like a CT scan or a colonoscopy. In some cases, the doctor may want to test a sample of tissue from the intestine. This test is called a biopsy. The doctor has checked you carefully, but problems can develop later. If you notice any problems or new symptoms, get medical treatment right away. Follow-up care is a key part of your treatment and safety. Be sure to make and go to all appointments, and call your doctor if you are having problems. It's also a good idea to know your test results and keep a list of the medicines you take. How can you care for yourself at home? · Rest until you feel better. · Your doctor may recommend that you eat bland foods. These include rice, dry toast or crackers, bananas, and applesauce. · To prevent dehydration, drink plenty of fluids. Choose water and other caffeine-free clear liquids until you feel better. If you have kidney, heart, or liver disease and have to limit fluids, talk with your doctor before you increase the amount of fluids you drink. · Be safe with medicines. Take your medicines exactly as prescribed. Call your doctor if you think you are having a problem with your medicine. You will get more details on the specific medicines your doctor prescribes. When should you call for help? Call 911 anytime you think you may need emergency care. For example, call if:  · You passed out (lost consciousness). · You vomit blood or what looks like coffee grounds.   · Your stools are maroon or very bloody. Call your doctor now or seek immediate medical care if:  · You have new or worse pain. · You have a new or higher fever. · You have new or worse symptoms. · You cannot keep fluids or medicines down. Watch closely for changes in your health, and be sure to contact your doctor if:  · You do not get better as expected. Where can you learn more? Go to [employer]be  Enter [id number] in the search box to learn more about \"Colitis: Care Instructions. \"   © 5538-9212 Healthwise, Incorporated. Care instructions adapted under license by [employer] (which disclaims liability or warranty for this information). This care instruction is for use with your licensed healthcare professional. If you have questions about a medical condition or this instruction, always ask your healthcare professional. [address] any warranty or liability for your use of this information.   Content Version: 84.4.242813; Current as of: November 20, 2015

## 2020-10-01 ENCOUNTER — PATIENT OUTREACH (OUTPATIENT)
Dept: CASE MANAGEMENT | Age: 45
End: 2020-10-01

## 2020-10-01 LAB
ATRIAL RATE: 123 BPM
CALCULATED P AXIS, ECG09: 3 DEGREES
CALCULATED R AXIS, ECG10: 18 DEGREES
CALCULATED T AXIS, ECG11: 65 DEGREES
DIAGNOSIS, 93000: NORMAL
P-R INTERVAL, ECG05: 116 MS
Q-T INTERVAL, ECG07: 332 MS
QRS DURATION, ECG06: 90 MS
QTC CALCULATION (BEZET), ECG08: 475 MS
VENTRICULAR RATE, ECG03: 123 BPM

## 2020-10-01 NOTE — PROGRESS NOTES
Outbound call to patient that was referred to this ACM by [employer]. Patient says she is an employee for [employer] and declines Case management. Message sent to Romaine Oppenheim- for employee referral. Episode resolved.

## 2020-10-02 ENCOUNTER — PATIENT OUTREACH (OUTPATIENT)
Dept: OTHER | Age: 45
End: 2020-10-02

## 2020-10-02 NOTE — PROGRESS NOTES
[facility] progress note    Patient eligible for Shona Ahsan Fisher 994 care management    Two patient identifiers verified. Verified  and address for HIPAA security. Discussed the care management program.  Patient agrees to care management services at this time. Patient's primary care provider relationship reviewed with patient and modified, as applicable. Patient was seen at [facility] ER 2020. Diagnosis: Gastroenteritis    Patient has significant diagnosis of DM 2 and morbid obesity . Care management assessment completed:    Patient stated problem: \"I've been having nausea, abdominal discomfort in all 4 quads, intermittent temp, burning and pain with urination for several days now. I was diagnosed and treatment for diverticulitis in the ER because  Have a h/o of it\"    Care manager identified primary concern. Concern for possible UTI or kidney stone due to large amount of blood in UA and uncontrolled diabetes. BS - 320. ER advised to hold Metformin X 3 days due to CT with dye procedure. PCP notified on concerns. Pt was started on ABX for GI, but C/S was done on urine. Encouraged pt to call [employer] if no better over the weekend, declined due to being a previous employee with [employer]. CM provided other options. High Risk patient 63%     Emotional Status/Adjustment to Health State: not engaged or actively working with PCP for better management of care.       Readiness to Change: []  Pre-contemplative    [x]  Contemplative  []  Preparation               []  Action                  []  Maintenance    Barriers/Challenges to Care: []  Decline in memory    []  Language barrier     []  Emotional                  []  Limited mobility  [x]  Lack of motivation     [] Vision, hearing or cognitive impairment [x]  Knowledge [] Financial Barriers  []  Other     Patient stated goal - make follow-up appt with PCP ASAP and contact provider via [id number] [address] Manager/Provider identified medical goal     Goals  Attends follow-up appointments as directed. PCP - TBD  Endo - ? Be Well - didn't complete wellness check before deadline         HTN Management (pt-stated)      9/30/2020 B/P - 125/105. Pt has not rechecked. Encouraged to recheck and monitor daily       Morbid Obesity (pt-stated)      Wt - 369 lbs BMI - 54.53  Would benefit from weight loss and enrolling in Be Well Naturally Slender program       Patient verbalizes understanding of self -management goals of living with Diabetes. BS - 320 yesterday. Does not check daily. Encouraged to check before meals and bedtime and to F/U with PCP ASAP. Currently holding Metformin due to CT dye. Will restart 10/4/2020.    -  Last A1C 10.8 in [facility] per patient. Last reading in chart was 2016.    -  Encouraged to enroll in [facility] Living with Diabetes ASAP         Supportive resources in place to maintain patient in the community (ie. Home Health, DME equipment, refer to, medication assistant plan, etc.)      [facility] 24/7  Nurse Access line 19 [address] [phone]  Be Well  [address] Understands red flags post discharge. You have severe belly pain. You have signs of needing more fluids. You have sunken eyes, a dry mouth,  and pass only a little dark urine. You feel like you are going to faint. You have increased belly pain that does not go away in 1 to 2 days. You have new or increased nausea, or you are vomiting. You have a new or higher fever. Your stools are black and tarlike or have streaks of blood.     Lab Results   Component Value Date/Time    WBC 16.1 (H) 09/30/2020 03:48 PM    Hemoglobin (POC) 13.3 10/09/2014 01:27 PM    HGB 13.5 09/30/2020 03:48 PM    Hematocrit (POC) 39 10/09/2014 01:27 PM    HCT 41.8 09/30/2020 03:48 PM    PLATELET 815 43/92/8465 03:48 PM    MCV 86.2 09/30/2020 03:48 PM     Lab Results Component Value Date/Time    Sodium 133 (L) 09/30/2020 03:48 PM    Potassium 3.8 09/30/2020 03:48 PM    Chloride 97 09/30/2020 03:48 PM    CO2 26 09/30/2020 03:48 PM    Anion gap 10 09/30/2020 03:48 PM    Glucose 300 (H) 09/30/2020 03:48 PM    BUN 11 09/30/2020 03:48 PM    Creatinine 0.95 09/30/2020 03:48 PM    BUN/Creatinine ratio 12 09/30/2020 03:48 PM    GFR est AA >60 09/30/2020 03:48 PM    GFR est non-AA >60 09/30/2020 03:48 PM    Calcium 8.8 09/30/2020 03:48 PM    Bilirubin, total 1.1 (H) 09/30/2020 03:48 PM    Alk. phosphatase 85 09/30/2020 03:48 PM    Protein, total 7.6 09/30/2020 03:48 PM    Albumin 3.2 (L) 09/30/2020 03:48 PM    Globulin 4.4 (H) 09/30/2020 03:48 PM    A-G Ratio 0.7 (L) 09/30/2020 03:48 PM    ALT (SGPT) 49 09/30/2020 03:48 PM    AST (SGOT) 36 09/30/2020 03:48 PM     9/30/2020  5:21 PM - [facility]     Component  Value  Flag  Ref Range  Units  Status    Color  RED       Final    Comment:    Color Reference Range: Straw, Yellow or Dark Yellow   Macroscopic performed on spun urine due to gross blood  or mucus    Appearance  CLOUDY  Abnormal    CLEAR     Final    Specific gravity  1.025   1.003 - 1.030     Final    pH (UA)  6.0   5.0 - 8.0     Final    Protein  100  Abnormal    NEG  mg/dL  Final    Glucose  >1,000  Abnormal    NEG  mg/dL  Final    Ketone  TRACE  Abnormal    NEG  mg/dL  Final    Bilirubin  Negative   NEG     Final    Blood  LARGE  Abnormal    NEG     Final    Urobilinogen  0.2   0.2 - 1.0  EU/dL  Final    Nitrites  Negative   NEG     Final    Leukocyte Esterase  Negative   NEG     Final    WBC  10-20   0 - 4  /hpf  Final    RBC  >100  High    0 - 5  /hpf  Final    Epithelial cells  FEW   FEW  /lpf  Final    Comment:    Epithelial cell category consists of squamous cells and /or transitional urothelial cells. Renal tubular cells, if present, are separately identified as such.     Bacteria  Negative   NEG  /hpf  Final                  Support System/Resources: none discussed at this time    Advance Care Planning: not on file     ADLS/DME: no    Referrals: [facility] Living with diabetes and PCP      Upcoming appointments: No future appointments. Care Plan for Chronic Disease:    1. Diagnosis 1- DM 2    2. Diagnosis 2- Gastroenteritis    3. Morbid obesity    4. HTN    3. Focused Assessment- Gastrointestinal Condition Focused Assessment    Skin- any open wounds, incisions or appliance no  Description of wound- n/a  New or worsening pain? yes  If yes, pain rated 0-10: 3 Location/pain characteristics: all 4 quads of abdomen   New or worsening numbness or tingling? no  If yes, location of numbness and tingling: n/a  Activity level- moving several times a day, or as recommended? yes  Abnormal activity level reported: no   Nutrition- prescribed diet? yes   Diet prescribed or recommended: diabetic diet  Difficulty swallowing no  Last weight of 369 lbs on 9/30/2020  Last BM on 9/28/2020 abnormal consistency or amount yes  Abnormal labs yes     In the last 24 hour have you experienced; Fever no  Low body temperature no  Chills or shaking no  Sweating no  Fast heart rate no  Fast breathing no  Dizziness/lightheadedness no  Confusion or unusual change in mental status no  Diarrhea yes  Nausea yes   Vomiting no  Shortness of breath or difficulty breathing no  Less urine output no  Cold, clammy, and pale skin no  Skin rash or skin color changes no      4. Key pt activities to achieve better health:   [x]  Weight loss  [x]  Improved diabetic control  []  Decreased cholesterol levels - doesn't have a recent reading on file  [x]  Decreased blood pressure  []    []    Patient verbalized understanding of all information discussed. Pt has my contact information for any further questions, concerns, or needs.     Plan for next call:   10/5/2020

## 2020-10-05 ENCOUNTER — PATIENT OUTREACH (OUTPATIENT)
Dept: OTHER | Age: 45
End: 2020-10-05

## 2020-10-05 LAB
BACTERIA SPEC CULT: NORMAL
SERVICE CMNT-IMP: NORMAL

## 2020-10-05 NOTE — LETTER
10/5/2020 2:09 PM 
 
Ms. Dre Mckee [address] [place] [address] [address] [phone] [facility] Ms. Boss, 
 
I am reaching out due to concern regarding your health after our conversation on Friday and again today when you abruptly hung up. It appears we had a misunderstanding in regards to when to go to the ER and the importance of calling the Nurse Access Line ( I provided you the number since you have not received your insurance card yet) first in order for the nurse to triage your situation before going to the ER unless it's a medical emergency and to prevent you from having an increase in co-pay. We discussed your sighs and symptoms leading up to the ER and following your visit. You informed me that you continue to have abdominal pain, intermittent fevers, uncontrolled  and overall not feeling well. You also informed me that you had NOT been in touch with your PCP following your ER visit, which I strongly encouraged you to call and to send a [employer] message ASAP. I also encouraged you seek [facility] if needed that day or over the weekend, which you declined due to being a \"former employee of [employer]. \" I provided alternative options such as the [facility], [phone] or [address] [place] [address]. [facility], [address] [place] [address] [phone] all of which I provided you name and numbers. You reported again today overall not feeling well complaining of pain, temp of 102 from noon yesterday until midnight, but I don't see where you utilized any of the services we spoke about nor have you made a follow-up appointment with your PCP or called? I inquired if you had reached out to your PCP, which you stated you had not because you are busy at work and you requested for me not to call you back anymore.  
 
I'm sorry that you feel my services are not needed, but feel free to reach out to me anytime for assistance with coordination of care and support. I believe I can be a valuable resource to you during the stressful time of transition of employment in addition to not feeling well. Take Care! Janett Real RN, [address] Associate Care Manager [address] [facility]  
[address] [facility], [facility] [address] [facility]  [address] Cell [phone] Fax [email or web address]

## 2020-10-05 NOTE — PROGRESS NOTES
CCM Outreach     Call placed to pt, Verified  and address for HIPAA security. Patient reported she continues to have pain and ran a temp of 102 yesterday. Inquired if pt had contacted PCP or sought care over the weekend from any of the resources provided to pt? Pt stated, \"I just called friend to come over to help and stayed home and suffered! \" Pt was abrupt with ACM voicing that she knows when she should go to the ER and when not to go. Pt was abrupt with this ACM and requested for ACM to never call her again. It appears to be a misunderstanding from a previous conversation this ACM had with pt in regards to when to go to the ER and the importance of calling the nurse access line first, which is provided on insurance card. Pt has not received her insurance card, which this ACM provided nurse line number and customer service in order for pt to call to inquire about insurance card. This ACM at no point told pt she should never go to the ER, but did stress the importance of calling Nurse Access Line first unless it is a medical emergency. This ACM provided multiple options for pt in regards to care this past weekend when she was off from work and strongly encouraged pt to call PCP office multiple times, which pt has not done so. Pt reported she is busy at work and has not call her PCP. Follow-up email was sent to PCP updating on concerns and conversation with pt. PCP has expressed pt has had compliance issues in the past, but will attempt to contact pt to schedule a f/u appt.      This ACM sent a [employer] message and offered assistance when needed along with contact information.

## 2020-10-06 ENCOUNTER — TELEPHONE (OUTPATIENT)
Dept: FAMILY MEDICINE CLINIC | Age: 45
End: 2020-10-06

## 2020-10-06 NOTE — LETTER
10/6/2020 11:56 AM 
 
Ms. Liza Blackburn [address] [place] [address] [address] [phone] Dear Ms. Boss: 
 
We've missed you! Please call our office at [phone] and schedule a follow up appointment for your continued care.  
 
 
 
Sincerely, 
 
 
Pebbles Lopez MD

## 2020-10-06 NOTE — TELEPHONE ENCOUNTER
Called pt, did not receive an answer and voice message box not set up. A letter has been mailed to pt.

## 2020-10-06 NOTE — TELEPHONE ENCOUNTER
----- Message from Annie Mauro MD sent at 10/5/2020  5:14 PM EDT -----  Darwin Melton  Please offer the patient an appt, see note below    kandice  ----- Message -----  From: Apryl Roberts RN  Sent: 10/5/2020   2:19 PM EDT  To: Jacobo Teixeira MD, Saima Samuel RN, #    Luther Kline,    I just called the following pt for an update and she continues to have pain, ran a temp of 102 yesterday etc, but pt has requested that I not call anymore and abruptly hung up. It appears to be a misunderstanding in regards to when to go to the ER and the importance of calling the nurse access line first on insurance card, see notes in [address] [place] [address]. I provided multiple options for pt in regards to care this past weekend when she was off from work and strongly encouraged pt to call your office multiple times, which pt has not done so. Pt reported she is busy at work and has not called you today. If someone from your office can please call and possibly speak with pt and to make at least a virtual appt that will be great. Thanks  Elzbieta Mann RN, [address], [address] [place] [address]  Cell [phone] Fax [email or web address]     ----- Message -----  From: Akiko Reeder MD  Sent: 10/4/2020   2:27 PM EDT  To: Margo Chamberlain RN    Thank you. I will try, but she has been grossly noncompliant int the past.  ----- Message -----  From: Apryl Roberts RN  Sent: 10/2/2020   5:22 PM EDT  To: MD [address]    My name is PEGGY Burns with [facility] Associate Care Management. I follow [facility] employee's or family members that have [address] insurance post discharge to assist with coordination of care and to help prevent readmission. I'm reaching out regarding concerns I have for the following patient that was seen in the ER on 10/1/2020.  I spoke with patient this afternoon and patient is reporting nausea, abdominal discomfort and burning, pain and frequency with urination, but denies temp. BS was 320 this AM. Pt is currently holding Metformin X 3 days due to CT contrast dye. Patient was treated in the ER for diverticulitis, but I'm concerned about possible UTI and uncontrolled diabetes. I encouraged patient to reach out to you for a follow-up visit even if it's virtual. I encouraged the pt to become familiar with [employer] in order to better communicate with you.     Thanks  Star Kathleen RN, [address], [address] [facility]  [address]  [facility] [phone] Fax [email or web address]

## 2020-10-12 ENCOUNTER — TELEPHONE (OUTPATIENT)
Dept: FAMILY MEDICINE CLINIC | Age: 45
End: 2020-10-12

## 2020-10-12 NOTE — TELEPHONE ENCOUNTER
Pt has been made aware of need to schedule appointment. Pt declined to schedule appointment due to work hours. Pt states, next month, her schedule may change and she may schedule at that time. Asked pt, again, about scheduling an appointment, even virtual, and pt declined.

## 2020-10-19 ENCOUNTER — TELEPHONE (OUTPATIENT)
Dept: OBGYN CLINIC | Age: 45
End: 2020-10-19

## 2020-10-19 RX ORDER — NORETHINDRONE 5 MG/1
10 TABLET ORAL DAILY
Qty: 40 TAB | Refills: 0 | Status: SHIPPED | OUTPATIENT
Start: 2020-10-19 | End: 2020-10-22 | Stop reason: ALTCHOICE

## 2020-10-19 NOTE — TELEPHONE ENCOUNTER
Patient of DM    She had a normal cycle in September 2020 per patient. Then 9-28-20 pain full blown, clotting and bleeding heavy began. This was before she took Provera. She bled for 7 days and did not improve. She started the Provera 10/5/20 because she thought she was supposed to take it. She said she was told to take the Provera to help her periods stop. On 9/30/20 she went to [place] ER for severe abdominal pain, fever and she was diagnosed inconclusive. CT scan was inconclusive. Could have been a ruptured cyst, diverticulitis or endometriosis per patient. They put her on Cipro and Flagyl. She had improvement in upper abdominal pain but still complains of lower abdominal left and right side. Today the bleeding is bright red, clotting as large as quarter size, no breast tenderness or bloating. She is wearing pads. Using 4-5 pads medium absorbancy pads daily. She said she changes because it is dirty in the front more than soaked. Basic blood work was done at ER for review in chart. Last HGB 13.5 on 9/30/20. Recently diagnosed as diabetic. What to do now? She gets rid of pain with 3 ALEVE!!!  She has been advised that this beyond what is recommended dosing wise. [employer] [id number]  [address]    Patient is aware to follow bleeding and pain precaution for tonight. She denies any issues from bleeding except it just keeps going.

## 2020-10-19 NOTE — TELEPHONE ENCOUNTER
Patient okay per DM to schedule US and follow up with her for the future, not critical.  If she calls back without control of her bleeding with Aygestin, she may switch her to Megace per DM verbal.       I called patient to let her know that we could go ahead and schedule. She set up for  Nov. 9 (8:15 am arrival)  And  9:10 for follow up with DM. She opted to go out into November due to her work schedule.

## 2020-10-19 NOTE — TELEPHONE ENCOUNTER
Will send eRX for Aygestin 10mg (5mg tabx2) daily. Should see improvement in bleeding within 24-48hrs. Schedule offc visit with US. Pain and fever most likely GI process. Should f/u with PCP for this.

## 2020-10-22 RX ORDER — MEGESTROL ACETATE 40 MG/1
40 TABLET ORAL 2 TIMES DAILY
Qty: 40 TAB | Refills: 0 | Status: SHIPPED | OUTPATIENT
Start: 2020-10-22 | End: 2020-12-10

## 2020-10-22 NOTE — TELEPHONE ENCOUNTER
Can switch from aygestin to megace. eRX sent for megace 40mg BID #40. Would not expect her to have severe pain d/t amt of bleeding she is having. Rec eval in ER if having severe pain.

## 2020-10-22 NOTE — TELEPHONE ENCOUNTER
rx has been sent and spoke with her again regarding severity of a 10/10 pain score. Advised of DM's advice, this bleeding may not be causing this issue of pain and needs to go to ER. If she opts not to go, she can try the Megace and see if helps, but again, encouraged ER visit.

## 2020-10-22 NOTE — TELEPHONE ENCOUNTER
10/22/20  12:44 pm-  Patient is calling with a 10/10 score saying that the abdominal pain is so much worse than it was but laughing the entire phone call. She said that the bleeding has not yet improved with the Aygestin. She said she is still bleeding through a pad 4-5 times per day, passing quarter size clots, wiping bright red. She and I talked with a pain score of 10/10 she needs to be seen at ER. She laughed and said that the last [place] in the ER told her never to come in for bleeding or abdominal pain.     She has AUB bleeding/pelvic pain f/u with US on 11/9/20      Estelita Gilchanell
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
## 2020-11-19 ENCOUNTER — TELEPHONE (OUTPATIENT)
Dept: FAMILY MEDICINE CLINIC | Age: 45
End: 2020-11-19

## 2020-11-19 ENCOUNTER — OFFICE VISIT (OUTPATIENT)
Dept: OBGYN CLINIC | Age: 45
End: 2020-11-19
Payer: MEDICAID

## 2020-11-19 VITALS — SYSTOLIC BLOOD PRESSURE: 133 MMHG | BODY MASS INDEX: 51.1 KG/M2 | WEIGHT: 293 LBS | DIASTOLIC BLOOD PRESSURE: 78 MMHG

## 2020-11-19 DIAGNOSIS — N93.9 ABNORMAL UTERINE BLEEDING (AUB): Primary | ICD-10-CM

## 2020-11-19 DIAGNOSIS — N93.9 ABNORMAL UTERINE BLEEDING (AUB): ICD-10-CM

## 2020-11-19 DIAGNOSIS — E11.9 TYPE 2 DIABETES MELLITUS WITHOUT COMPLICATION, WITHOUT LONG-TERM CURRENT USE OF INSULIN (HCC): Primary | ICD-10-CM

## 2020-11-19 LAB
ERYTHROCYTE [DISTWIDTH] IN BLOOD BY AUTOMATED COUNT: 13 % (ref 11.5–14.5)
FERRITIN SERPL-MCNC: 13 NG/ML (ref 26–388)
HCT VFR BLD AUTO: 40.7 % (ref 35–47)
HGB BLD-MCNC: 12.9 G/DL (ref 11.5–16)
IRON SATN MFR SERPL: 5 % (ref 20–50)
IRON SERPL-MCNC: 27 UG/DL (ref 35–150)
MCH RBC QN AUTO: 26.1 PG (ref 26–34)
MCHC RBC AUTO-ENTMCNC: 31.7 G/DL (ref 30–36.5)
MCV RBC AUTO: 82.4 FL (ref 80–99)
NRBC # BLD: 0 K/UL (ref 0–0.01)
NRBC BLD-RTO: 0 PER 100 WBC
PLATELET # BLD AUTO: 328 K/UL (ref 150–400)
PMV BLD AUTO: 11.7 FL (ref 8.9–12.9)
RBC # BLD AUTO: 4.94 M/UL (ref 3.8–5.2)
TIBC SERPL-MCNC: 530 UG/DL (ref 250–450)
WBC # BLD AUTO: 10 K/UL (ref 3.6–11)

## 2020-11-19 PROCEDURE — 99214 OFFICE O/P EST MOD 30 MIN: CPT | Performed by: OBSTETRICS & GYNECOLOGY

## 2020-11-19 PROCEDURE — 76830 TRANSVAGINAL US NON-OB: CPT | Performed by: OBSTETRICS & GYNECOLOGY

## 2020-11-19 RX ORDER — MEDROXYPROGESTERONE ACETATE 10 MG/1
10 TABLET ORAL DAILY
Qty: 20 TAB | Refills: 0 | Status: SHIPPED | OUTPATIENT
Start: 2020-11-19 | End: 2020-12-09

## 2020-11-19 NOTE — TELEPHONE ENCOUNTER
Pt scheduled a DM f/u for 12/10/20 and is asking if Dr Trey Espana would order her labs prior to this appt? Pt states she cannot answer her phone while at work so she will call back to check status of labs to be ordered. She is aware that Dr Trey Espana will be off until 11/30/20.

## 2020-11-25 DIAGNOSIS — N93.9 ABNORMAL UTERINE BLEEDING (AUB): Primary | ICD-10-CM

## 2020-11-27 DIAGNOSIS — N93.9 ABNORMAL UTERINE BLEEDING (AUB): ICD-10-CM

## 2020-11-27 LAB
COMMENT, HOLDF: NORMAL
SAMPLES BEING HELD,HOLD: NORMAL

## 2020-11-29 LAB
C TRACH RRNA SPEC QL NAA+PROBE: NEGATIVE
N GONORRHOEA RRNA SPEC QL NAA+PROBE: NEGATIVE
SPECIMEN SOURCE: NORMAL
T VAGINALIS RRNA VAG QL NAA+PROBE: NEGATIVE

## 2020-11-30 DIAGNOSIS — E11.9 TYPE 2 DIABETES MELLITUS WITHOUT COMPLICATION, WITHOUT LONG-TERM CURRENT USE OF INSULIN (HCC): ICD-10-CM

## 2020-11-30 LAB
ALBUMIN SERPL-MCNC: 3.9 G/DL (ref 3.5–5)
ALBUMIN/GLOB SERPL: 1.2 {RATIO} (ref 1.1–2.2)
ALP SERPL-CCNC: 72 U/L (ref 45–117)
ALT SERPL-CCNC: 33 U/L (ref 12–78)
ANION GAP SERPL CALC-SCNC: 5 MMOL/L (ref 5–15)
AST SERPL-CCNC: 11 U/L (ref 15–37)
BILIRUB DIRECT SERPL-MCNC: 0.1 MG/DL (ref 0–0.2)
BILIRUB SERPL-MCNC: 0.4 MG/DL (ref 0.2–1)
BUN SERPL-MCNC: 11 MG/DL (ref 6–20)
BUN/CREAT SERPL: 13 (ref 12–20)
CALCIUM SERPL-MCNC: 8.8 MG/DL (ref 8.5–10.1)
CHLORIDE SERPL-SCNC: 105 MMOL/L (ref 97–108)
CHOLEST SERPL-MCNC: 160 MG/DL
CO2 SERPL-SCNC: 26 MMOL/L (ref 21–32)
CREAT SERPL-MCNC: 0.84 MG/DL (ref 0.55–1.02)
CREAT UR-MCNC: 200 MG/DL
EST. AVERAGE GLUCOSE BLD GHB EST-MCNC: 246 MG/DL
GLOBULIN SER CALC-MCNC: 3.3 G/DL (ref 2–4)
GLUCOSE SERPL-MCNC: 242 MG/DL (ref 65–100)
HBA1C MFR BLD: 10.2 % (ref 4–5.6)
HDLC SERPL-MCNC: 30 MG/DL
HDLC SERPL: 5.3 {RATIO} (ref 0–5)
LDLC SERPL CALC-MCNC: 101.2 MG/DL (ref 0–100)
LIPID PROFILE,FLP: ABNORMAL
MICROALBUMIN UR-MCNC: 9.6 MG/DL
MICROALBUMIN/CREAT UR-RTO: 48 MG/G (ref 0–30)
POTASSIUM SERPL-SCNC: 4.4 MMOL/L (ref 3.5–5.1)
PROT SERPL-MCNC: 7.2 G/DL (ref 6.4–8.2)
SODIUM SERPL-SCNC: 136 MMOL/L (ref 136–145)
TRIGL SERPL-MCNC: 144 MG/DL (ref ?–150)
VLDLC SERPL CALC-MCNC: 28.8 MG/DL

## 2020-12-08 ENCOUNTER — TELEPHONE (OUTPATIENT)
Dept: FAMILY MEDICINE CLINIC | Age: 45
End: 2020-12-08

## 2020-12-08 NOTE — TELEPHONE ENCOUNTER
Please call patient and let her know her diabetes is out of control. She has an appointment with me on the 10th and she needs to have her fasting blood sugars with her for this.

## 2020-12-10 ENCOUNTER — VIRTUAL VISIT (OUTPATIENT)
Dept: FAMILY MEDICINE CLINIC | Age: 45
End: 2020-12-10
Payer: COMMERCIAL

## 2020-12-10 DIAGNOSIS — E78.00 HYPERCHOLESTEROLEMIA: ICD-10-CM

## 2020-12-10 DIAGNOSIS — E66.01 MORBID OBESITY (HCC): ICD-10-CM

## 2020-12-10 DIAGNOSIS — E11.9 TYPE 2 DIABETES MELLITUS WITHOUT COMPLICATION, WITHOUT LONG-TERM CURRENT USE OF INSULIN (HCC): Primary | ICD-10-CM

## 2020-12-10 PROCEDURE — 99214 OFFICE O/P EST MOD 30 MIN: CPT | Performed by: FAMILY MEDICINE

## 2020-12-10 RX ORDER — GLIPIZIDE 10 MG/1
10 TABLET, FILM COATED, EXTENDED RELEASE ORAL DAILY
Qty: 30 TAB | Refills: 1 | Status: SHIPPED | OUTPATIENT
Start: 2020-12-10 | End: 2021-03-05 | Stop reason: SDUPTHER

## 2020-12-10 RX ORDER — DULAGLUTIDE 0.75 MG/.5ML
0.75 INJECTION, SOLUTION SUBCUTANEOUS
Qty: 4 PEN | Refills: 1 | Status: SHIPPED | OUTPATIENT
Start: 2020-12-10 | End: 2021-03-05

## 2020-12-10 RX ORDER — PEN NEEDLE, DIABETIC 30 GX3/16"
NEEDLE, DISPOSABLE MISCELLANEOUS
Qty: 50 PACKAGE | Refills: 4 | Status: SHIPPED
Start: 2020-12-10 | End: 2021-05-18

## 2020-12-10 RX ORDER — NABUMETONE 500 MG/1
TABLET, FILM COATED ORAL
COMMUNITY
Start: 2020-10-01 | End: 2020-12-10

## 2020-12-10 NOTE — PROGRESS NOTES
Ingrid Jones is a 39 y.o. female who was seen by synchronous (real-time) audio-video technology on 12/10/2020. Assessment & Plan:   Diagnoses and all orders for this visit:    1. Type 2 diabetes mellitus without complication, without long-term current use of insulin (HCC)  -     dulaglutide (Trulicity) 8.28 FM/0.5 mL sub-q pen; 0.5 mL by SubCUTAneous route every seven (7) days.  -     glipiZIDE SR (GLUCOTROL XL) 10 mg CR tablet; Take 1 Tab by mouth daily. TAKE BEFORE BREAKFAST    2. Hypercholesterolemia    3. Morbid obesity ([address]    Other orders  -     Insulin Needles, Disposable, 31 gauge x 5/16\" ndle; Use every 7 days or as directed        Uncontrolled diabetes  Will eventually need a statin  Glipizide  Trulicity - hopefully this will help with weight loss    Follow-up and Dispositions    · Return in about 6 weeks (around 1/21/2021) for diabetes, cholesterol. Reviewed plan of care. Patient has provided input and agrees with goals. CPT Codes 90208-65912 for Established Patients may apply to this Telehealth Visit      Subjective:   Ingrid Jones was seen for Diabetes (Follow up) and Abdominal Pain (2/10)      Patient presents with:  Diabetes: Follow up  Abdominal Pain: 2/10    Manjula Boss is here for diabetic follow up. Her recent A1C was 10.2. This is a chronic problem which has improved. It is improved by ,metformin, which is/are working mildly. She has been out of metformin for 4 days. This is causing diarrhea, abdominal pain. diaphoresis, nausea and dizziness. This has all resolved since she stopped it. Also, her recent LDL was 101.2. She was seen in the ER 9/30/12 for abdominal pain. This has improved. She was presumtivly treated for diverticulitis, but her pain never went away. Since being off the metformin, this is improved. Currently, she is working with gyn for her remaining abdominal pain.         Review of Systems   Constitutional: Negative for weight loss. No weight gain   Eyes: Negative for blurred vision. Respiratory: Negative for shortness of breath. Cardiovascular: Negative for chest pain and leg swelling. Gastrointestinal: Positive for abdominal pain. Genitourinary:        No polyuria   Neurological: Negative for dizziness, sensory change, speech change, focal weakness and headaches. Endo/Heme/Allergies: Positive for polydipsia. Objective:   /80, P 80, W 346 lbs    Physical Exam  Constitutional:       General: She is not in acute distress. Appearance: Normal appearance. She is not diaphoretic. Neurological:      Mental Status: She is alert and oriented to person, place, and time. Due to this being a TeleHealth evaluation, many elements of the physical examination are unable to be assessed. We discussed the expected course, resolution and complications of the diagnosis(es) in detail. Medication risks, benefits, costs, interactions, and alternatives were discussed as indicated. I advised her to contact the office if her condition worsens, changes or fails to improve as anticipated. She expressed understanding with the diagnosis(es) and plan. Pursuant to the emergency declaration under the [facility], [facility] waiver authority and the [employer], this Virtual  Visit was conducted, with patient's consent, to reduce the patient's risk of exposure to COVID-19 and provide continuity of care for an established patient. Services were provided through a video synchronous discussion virtually to substitute for in-person clinic visit.     Monisha Thomas MD

## 2020-12-10 NOTE — PROGRESS NOTES
Chief Complaint   Patient presents with    Diabetes     Follow up    Abdominal Pain     2/10   1. Have you been to the ER, urgent care clinic since your last visit? Hospitalized since your last visit? Abdominal pain. 2. Have you seen or consulted any other health care providers outside of the [address] [place] [address] since your last visit? Include any pap smears or colon screening.  No

## 2020-12-14 NOTE — TELEPHONE ENCOUNTER
Pt called to advise she received message from her pharmacy notifying her Trulicity is not covered under her insurance and medication needs to be changed to covered alternative. Please advise.  Aline
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
## 2020-12-17 ENCOUNTER — OFFICE VISIT (OUTPATIENT)
Dept: OBGYN CLINIC | Age: 45
End: 2020-12-17
Payer: COMMERCIAL

## 2020-12-17 DIAGNOSIS — R10.32 LLQ PAIN: ICD-10-CM

## 2020-12-17 DIAGNOSIS — N93.9 ABNORMAL UTERINE BLEEDING (AUB): Primary | ICD-10-CM

## 2020-12-17 PROCEDURE — 99212 OFFICE O/P EST SF 10 MIN: CPT | Performed by: OBSTETRICS & GYNECOLOGY

## 2020-12-17 PROCEDURE — 58100 BIOPSY OF UTERUS LINING: CPT | Performed by: OBSTETRICS & GYNECOLOGY

## 2020-12-17 NOTE — TELEPHONE ENCOUNTER
Braden HARRIS, through 's Medicaid insurance and received the following response:    Based on [facility] MEDICAID eligibility information this Patient has other primary coverage and the pharmacy will need to bill the other third party first before billing [address]

## 2020-12-17 NOTE — TELEPHONE ENCOUNTER
Pt stopped by requesting call back to advise if Dr. Stewart Toure has decided yet what medication she can take if Trulicity is not covered, stating she can't take Metformin because it makes her sick. Pt concerned because her blood sugars are starting to go up again.  Aline

## 2020-12-18 NOTE — TELEPHONE ENCOUNTER
Please find out if the patient has secondary coverage. If she does, she needs to let the pharmacy know.

## 2020-12-18 NOTE — TELEPHONE ENCOUNTER
PA has been sent to pt's primary insurance. If denied will need to change medication all together. Medicaid not covering as well.

## 2020-12-23 LAB
CPT CODES, 490044: NORMAL
CPT DISCLAIMER: NORMAL
CYTOLOGY SPEC DOC CYTO: NORMAL
DIAGNOSIS SYNOPSIS:: NORMAL
DX ICD CODE: NORMAL
PATH REPORT.GROSS SPEC: NORMAL
PATH REPORT.RELEVANT HX SPEC: NORMAL
PATHOLOGIST NAME: NORMAL
PDF IMAGE, 807507: NORMAL
SPECIMEN SOURCE: NORMAL

## 2020-12-29 ENCOUNTER — TELEPHONE (OUTPATIENT)
Dept: FAMILY MEDICINE CLINIC | Age: 45
End: 2020-12-29

## 2020-12-29 NOTE — TELEPHONE ENCOUNTER
Per [employer]. Prior Authorization for Trulicity is not required. Pharmacy can call customer service at [phone].

## 2021-03-05 ENCOUNTER — VIRTUAL VISIT (OUTPATIENT)
Dept: FAMILY MEDICINE CLINIC | Age: 46
End: 2021-03-05
Payer: MEDICAID

## 2021-03-05 DIAGNOSIS — E11.9 TYPE 2 DIABETES MELLITUS WITHOUT COMPLICATION, WITHOUT LONG-TERM CURRENT USE OF INSULIN (HCC): Primary | ICD-10-CM

## 2021-03-05 PROCEDURE — 99213 OFFICE O/P EST LOW 20 MIN: CPT | Performed by: FAMILY MEDICINE

## 2021-03-05 RX ORDER — GLIPIZIDE 10 MG/1
10 TABLET, FILM COATED, EXTENDED RELEASE ORAL DAILY
Qty: 30 TAB | Refills: 1 | Status: SHIPPED | OUTPATIENT
Start: 2021-03-05 | End: 2021-06-11 | Stop reason: SDUPTHER

## 2021-03-05 NOTE — PROGRESS NOTES
Atha Boast is a 42 y.o. female who was seen by synchronous (real-time) audio-video technology on 3/5/2021. Assessment & Plan:   Diagnoses and all orders for this visit:    1. Type 2 diabetes mellitus without complication, without long-term current use of insulin (HCC)  -     exenatide microspheres (Bydureon) 2 mg serr; 2 mg by SubCUTAneous route every seven (7) days.  -     glipiZIDE SR (GLUCOTROL XL) 10 mg CR tablet; Take 1 Tab by mouth daily. TAKE BEFORE BREAKFAST        Uncontrolled diabetes  Start Bydureon, she will let me know right away if this is not covered  She is going to contact her insurance company and find out what is covered  No glimiperide  Refills per orders    Follow-up and Dispositions    · Return in about 6 weeks (around 4/16/2021) for diabetes. Reviewed plan of care. Patient has provided input and agrees with goals. CPT Codes 84363-07843 for Established Patients may apply to this Telehealth Visit      Subjective:   Atha Boast was seen for Medication Evaluation      Atha Boast is here for diabetic follow up. Their FBS have been up to 400. It is improved by glipizide , which is/are working very little. She was unable of afford the Trulicity. Her roommate had 3 Trulicity pens, which she used, and her blood sugars went down to about 150. Then she took her roommate's glimiperide (2 mg) and her blood sugars went down to 200. ROS      Objective:   /80  BP Readings from Last 3 Encounters:   11/19/20 133/78   09/30/20 (!) 125/105   01/03/20 135/79       Physical Exam    Due to this being a TeleHealth evaluation, many elements of the physical examination are unable to be assessed. We discussed the expected course, resolution and complications of the diagnosis(es) in detail. Medication risks, benefits, costs, interactions, and alternatives were discussed as indicated.   I advised her to contact the office if her condition worsens, changes or fails to improve as anticipated. She expressed understanding with the diagnosis(es) and plan. Pursuant to the emergency declaration under the [facility], [facility] waiver authority and the [employer] Act, this Virtual  Visit was conducted, with patient's consent, to reduce the patient's risk of exposure to COVID-19 and provide continuity of care for an established patient. Services were provided through a video synchronous discussion virtually to substitute for in-person clinic visit.     Rex Chandler MD

## 2021-03-05 NOTE — PROGRESS NOTES
Chief Complaint   Patient presents with    Medication Evaluation     1. Have you been to the ER, urgent care clinic since your last visit? Hospitalized since your last visit? No    2. Have you seen or consulted any other health care providers outside of the [address] [place] [address] since your last visit? Include any pap smears or colon screening.  No

## 2021-05-14 ENCOUNTER — TELEPHONE (OUTPATIENT)
Dept: FAMILY MEDICINE CLINIC | Age: 46
End: 2021-05-14

## 2021-05-14 NOTE — TELEPHONE ENCOUNTER
Pt called to advise Dr. Ye Linn she's not been able to take Bydureon due to not being covered by insurance because of change with how it's administered and blood sugar elevated , possibly between 400 to 500. Fasting blood sugar today this morning 398. Pt also complains of heart palpitations, urinary frequency, swelling of right lower leg from knee down, but refuses to go to ER because she states they won't be able to do anything for her, wants call back to advise what to do as far as medication.  Aline

## 2021-05-14 NOTE — TELEPHONE ENCOUNTER
Please have her contact [facility] to come see her and follow up with me next week. She will likely need insulin.

## 2021-05-14 NOTE — TELEPHONE ENCOUNTER
Pt has been advised of this, declines to go to ER or call [facility]. Pt has been scheduled for 05/18/2021.

## 2021-05-18 ENCOUNTER — VIRTUAL VISIT (OUTPATIENT)
Dept: FAMILY MEDICINE CLINIC | Age: 46
End: 2021-05-18
Payer: COMMERCIAL

## 2021-05-18 DIAGNOSIS — Z11.59 NEED FOR HEPATITIS C SCREENING TEST: ICD-10-CM

## 2021-05-18 DIAGNOSIS — E11.9 TYPE 2 DIABETES MELLITUS WITHOUT COMPLICATION, WITHOUT LONG-TERM CURRENT USE OF INSULIN (HCC): Primary | ICD-10-CM

## 2021-05-18 DIAGNOSIS — R60.0 BILATERAL LEG EDEMA: ICD-10-CM

## 2021-05-18 PROCEDURE — 99214 OFFICE O/P EST MOD 30 MIN: CPT | Performed by: FAMILY MEDICINE

## 2021-05-18 NOTE — PROGRESS NOTES
Ashlyn Jovel is a 42 y.o. female who was seen by synchronous (real-time) audio-video technology on 5/18/2021. Assessment & Plan:   Diagnoses and all orders for this visit:    1. Type 2 diabetes mellitus without complication, without long-term current use of insulin (HCC)  -     METABOLIC PANEL, BASIC; Future  -     HEMOGLOBIN A1C WITH EAG; Future  -     liraglutide (VICTOZA) 0.6 mg/0.1 mL (18 mg/3 mL) pnij; 0.6 mg by SubCUTAneous route daily. For 2 weeks, then increase to 1.2 ml    2. Bilateral leg edema    3. Need for hepatitis C screening test  -     HEPATITIS C AB, RFLX TO QT BY PCR; Future        Uncontrolled diabetes, will likely need insulin  Worsening, bilateral leg edema  Labs per orders. Victoza  Declines Lasix  Elevated legs    Follow-up and Dispositions    · Return in about 1 month (around 6/18/2021) for diabetes. Reviewed plan of care. Patient has provided input and agrees with goals. CPT Codes 68091-59201 for Established Patients may apply to this Telehealth Visit      Subjective:   Ashlyn Jovel was seen for Diabetes (Follow up) and Leg Swelling (Right, 3/10.)      Patient presents with:  Diabetes: Follow up  Leg Swelling: Right, 3/10. Ashlyn Jovel is here for diabetic follow up. Their FBS have been around 400,450, 300. She is out of her Bydureon because her insurance would not cover it anymore. Her right leg has been swelling. Evidently, it started this past January with an exacerbation of her ankle osteoarthritis. For the past 2 weeks it has been swollen from the knee distally and it is painful. Denies redness or warmth. There is not \"knot\". She has been trying to walk more and has been pushing herself. Review of Systems   Eyes: Negative for blurred vision. Respiratory: Positive for wheezing. Negative for cough and shortness of breath. She has chronic wheezing with exercise. Cardiovascular: Negative for chest pain. Genitourinary:        Polyuria   Neurological: Positive for dizziness. Negative for sensory change, speech change, focal weakness and headaches. Occasional dizziness. She gets headaches 2-3 times a week, which she thinks it is due work stress. This started 2 months ago and are not getting worse. Endo/Heme/Allergies: Positive for polydipsia. Objective:     Physical Exam  Constitutional:       General: She is not in acute distress. Appearance: Normal appearance. Musculoskeletal:      Right lower leg: Edema present. Left lower leg: Edema present. Comments: Slightly more edema on the right. Skin:     Comments: No leg redness. Neurological:      Mental Status: She is alert and oriented to person, place, and time. Due to this being a TeleHealth evaluation, many elements of the physical examination are unable to be assessed. We discussed the expected course, resolution and complications of the diagnosis(es) in detail. Medication risks, benefits, costs, interactions, and alternatives were discussed as indicated. I advised her to contact the office if her condition worsens, changes or fails to improve as anticipated. She expressed understanding with the diagnosis(es) and plan. Pursuant to the emergency declaration under the [facility], [facility] waiver authority and the [employer] General Act, this Virtual  Visit was conducted, with patient's consent, to reduce the patient's risk of exposure to COVID-19 and provide continuity of care for an established patient. Services were provided through a video synchronous discussion virtually to substitute for in-person clinic visit.     Ольга Daniel MD

## 2021-05-18 NOTE — PROGRESS NOTES
Chief Complaint   Patient presents with    Diabetes     Follow up    Leg Swelling     Right, 3/10. 1. Have you been to the ER, urgent care clinic since your last visit? Hospitalized since your last visit? No    2. Have you seen or consulted any other health care providers outside of the [address] [place] [address] since your last visit? Include any pap smears or colon screening. No    .

## 2021-06-11 DIAGNOSIS — E11.9 TYPE 2 DIABETES MELLITUS WITHOUT COMPLICATION, WITHOUT LONG-TERM CURRENT USE OF INSULIN (HCC): ICD-10-CM

## 2021-06-11 RX ORDER — GLIPIZIDE 10 MG/1
10 TABLET, FILM COATED, EXTENDED RELEASE ORAL DAILY
Qty: 30 TABLET | Refills: 1 | Status: SHIPPED | OUTPATIENT
Start: 2021-06-11 | End: 2021-09-08 | Stop reason: SDUPTHER

## 2021-06-22 ENCOUNTER — VIRTUAL VISIT (OUTPATIENT)
Dept: FAMILY MEDICINE CLINIC | Age: 46
End: 2021-06-22

## 2021-06-22 RX ORDER — PEN NEEDLE, DIABETIC 31 GX5/16"
NEEDLE, DISPOSABLE MISCELLANEOUS
COMMUNITY
Start: 2021-05-21 | End: 2021-12-10

## 2021-06-22 NOTE — PROGRESS NOTES
Chief Complaint   Patient presents with    Labs     Review     1. Have you been to the ER, urgent care clinic since your last visit? Hospitalized since your last visit? No    2. Have you seen or consulted any other health care providers outside of the [address] [place] [address] since your last visit? Include any pap smears or colon screening. No    .

## 2021-06-25 ENCOUNTER — VIRTUAL VISIT (OUTPATIENT)
Dept: FAMILY MEDICINE CLINIC | Age: 46
End: 2021-06-25
Payer: COMMERCIAL

## 2021-06-25 DIAGNOSIS — E11.9 TYPE 2 DIABETES MELLITUS WITHOUT COMPLICATION, WITHOUT LONG-TERM CURRENT USE OF INSULIN (HCC): ICD-10-CM

## 2021-06-25 PROCEDURE — 99442 PR PHYS/QHP TELEPHONE EVALUATION 11-20 MIN: CPT | Performed by: FAMILY MEDICINE

## 2021-06-25 NOTE — PROGRESS NOTES
Cosme Bryan is a 42 y.o. female evaluated via telephone on 6/25/2021. Consent:  She and/or health care decision maker is aware that she may receive a bill for this telephone service, depending on her insurance coverage, and has provided verbal consent to proceed: Yes      Documentation:  I communicated with the patient and/or health care decision maker about her diabetes. Details of this discussion including any medical advice provided:       Subjective:   Cosme Bryan was seen for Labs (F/u Labs DM claims 98% compliant on injection.  )      Patient presents with:  Labs: F/u Labs DM claims 98% compliant on injection. Her A1C was 9.5 last month. At her last visit she started Victoza and has been on 1.2 mg for about two weeks. Her FBS have been around 150, 170 over the past week. Review of Systems   Eyes: Negative for blurred vision. Genitourinary:        Polyuria   Endo/Heme/Allergies: Positive for polydipsia. Objective:     /80, P 100    Assessment & Plan:   Diagnoses and all orders for this visit:    1. Type 2 diabetes mellitus without complication, without long-term current use of insulin (HCC)  -     liraglutide (VICTOZA) 0.6 mg/0.1 mL (18 mg/3 mL) pnij; 1.8 mg by SubCUTAneous route daily. Improving  Increase Victoza  Lifestyle modifications    Follow-up and Dispositions    · Return in about 1 month (around 7/25/2021) for diabetes. Reviewed plan of care. Patient has provided input and agrees with goals. I affirm this is a Patient Initiated Episode with an Established Patient who has not had a related appointment within my department in the past 7 days or scheduled within the next 24 hours.     Total Time: minutes: 11-20 minutes    Note: not billable if this call serves to triage the patient into an appointment for the relevant concern      Alisha Sweet MD

## 2021-08-09 DIAGNOSIS — M25.561 RIGHT KNEE PAIN, UNSPECIFIED CHRONICITY: Primary | ICD-10-CM

## 2021-08-12 ENCOUNTER — OFFICE VISIT (OUTPATIENT)
Dept: ORTHOPEDIC SURGERY | Age: 46
End: 2021-08-12
Payer: COMMERCIAL

## 2021-08-12 VITALS
TEMPERATURE: 96.8 F | SYSTOLIC BLOOD PRESSURE: 147 MMHG | DIASTOLIC BLOOD PRESSURE: 85 MMHG | OXYGEN SATURATION: 97 % | HEART RATE: 103 BPM | HEIGHT: 69 IN | BODY MASS INDEX: 43.4 KG/M2 | WEIGHT: 293 LBS

## 2021-08-12 DIAGNOSIS — M25.561 CHRONIC PAIN OF RIGHT KNEE: Primary | ICD-10-CM

## 2021-08-12 DIAGNOSIS — G89.29 CHRONIC PAIN OF RIGHT KNEE: Primary | ICD-10-CM

## 2021-08-12 PROCEDURE — 99203 OFFICE O/P NEW LOW 30 MIN: CPT | Performed by: ORTHOPAEDIC SURGERY

## 2021-08-12 NOTE — PROGRESS NOTES
Identified pt with two pt identifiers (name and ). Reviewed chart in preparation for visit and have obtained necessary documentation. Diana Diaz is a 42 y.o. female  Chief Complaint   Patient presents with    Knee Pain     RT knee     Visit Vitals  BP (!) 147/85 (BP 1 Location: Right arm, BP Patient Position: Sitting, BP Cuff Size: Large adult)   Pulse (!) 103   Temp 96.8 °F (36 °C) (Tympanic)   Ht 5' 9\" (1.753 m)   Wt (!) 370 lb (167.8 kg)   SpO2 97%   BMI 54.64 kg/m²     1. Have you been to the ER, urgent care clinic since your last visit? Hospitalized since your last visit? No    2. Have you seen or consulted any other health care providers outside of the [address] [place] [address] since your last visit? Include any pap smears or colon screening.  No
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
## 2021-08-16 DIAGNOSIS — E11.9 TYPE 2 DIABETES MELLITUS WITHOUT COMPLICATION, WITHOUT LONG-TERM CURRENT USE OF INSULIN (HCC): ICD-10-CM

## 2021-08-24 ENCOUNTER — HOSPITAL ENCOUNTER (OUTPATIENT)
Dept: MRI IMAGING | Age: 46
Discharge: HOME | End: 2021-08-24
Attending: ORTHOPAEDIC SURGERY
Payer: COMMERCIAL

## 2021-08-24 DIAGNOSIS — G89.29 CHRONIC PAIN OF RIGHT KNEE: ICD-10-CM

## 2021-08-24 DIAGNOSIS — M25.561 CHRONIC PAIN OF RIGHT KNEE: ICD-10-CM

## 2021-08-24 PROCEDURE — 73721 MRI JNT OF LWR EXTRE W/O DYE: CPT

## 2021-08-27 ENCOUNTER — OFFICE VISIT (OUTPATIENT)
Dept: ORTHOPEDIC SURGERY | Age: 46
End: 2021-08-27
Payer: COMMERCIAL

## 2021-08-27 VITALS
HEART RATE: 84 BPM | BODY MASS INDEX: 43.4 KG/M2 | DIASTOLIC BLOOD PRESSURE: 83 MMHG | SYSTOLIC BLOOD PRESSURE: 132 MMHG | TEMPERATURE: 97.5 F | HEIGHT: 69 IN | WEIGHT: 293 LBS | OXYGEN SATURATION: 97 %

## 2021-08-27 DIAGNOSIS — S83.231D COMPLEX TEAR OF MEDIAL MENISCUS OF RIGHT KNEE, SUBSEQUENT ENCOUNTER: Primary | ICD-10-CM

## 2021-08-27 PROCEDURE — 99214 OFFICE O/P EST MOD 30 MIN: CPT | Performed by: ORTHOPAEDIC SURGERY

## 2021-08-27 NOTE — PROGRESS NOTES
Identified pt with two pt identifiers (name and ). Reviewed chart in preparation for visit and have obtained necessary documentation. Paul Hein is a 42 y.o. female  Chief Complaint   Patient presents with    Results     MRI results RT knee     Visit Vitals  /83 (BP 1 Location: Right arm, BP Patient Position: Sitting, BP Cuff Size: Large adult)   Pulse 84   Temp 97.5 °F (36.4 °C) (Tympanic)   Ht 5' 9\" (1.753 m)   Wt (!) 370 lb (167.8 kg)   SpO2 97%   BMI 54.64 kg/m²     1. Have you been to the ER, urgent care clinic since your last visit? Hospitalized since your last visit? No    2. Have you seen or consulted any other health care providers outside of the [address] [place] [address] since your last visit? Include any pap smears or colon screening.  No
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
## 2021-09-08 ENCOUNTER — TELEPHONE (OUTPATIENT)
Dept: ORTHOPEDIC SURGERY | Age: 46
End: 2021-09-08

## 2021-09-08 DIAGNOSIS — E11.9 TYPE 2 DIABETES MELLITUS WITHOUT COMPLICATION, WITHOUT LONG-TERM CURRENT USE OF INSULIN (HCC): ICD-10-CM

## 2021-09-08 NOTE — TELEPHONE ENCOUNTER
Contacted patient. Scheduled for 9/20/2021. I advised pt that clearance from PCP would be required and that she would rcv a call from PAT to determine if PAT was needed. Clearance letter faxed to Dr. Nara Patel. Patient was advised to follow up and make sure form is signed for surgery. She is also requesting a work note for 1 month.

## 2021-09-09 DIAGNOSIS — E11.9 TYPE 2 DIABETES MELLITUS WITHOUT COMPLICATION, WITHOUT LONG-TERM CURRENT USE OF INSULIN (HCC): ICD-10-CM

## 2021-09-11 RX ORDER — GLIPIZIDE 10 MG/1
10 TABLET, FILM COATED, EXTENDED RELEASE ORAL DAILY
Qty: 30 TABLET | Refills: 3 | Status: SHIPPED | OUTPATIENT
Start: 2021-09-11 | End: 2021-09-13 | Stop reason: SDUPTHER

## 2021-09-11 RX ORDER — GLIPIZIDE 10 MG/1
10 TABLET, FILM COATED, EXTENDED RELEASE ORAL DAILY
Qty: 90 TABLET | Refills: 2 | Status: SHIPPED
Start: 2021-09-11 | End: 2021-09-11 | Stop reason: SDUPTHER

## 2021-09-13 ENCOUNTER — TELEPHONE (OUTPATIENT)
Dept: FAMILY MEDICINE CLINIC | Age: 46
End: 2021-09-13

## 2021-09-13 ENCOUNTER — OFFICE VISIT (OUTPATIENT)
Dept: FAMILY MEDICINE CLINIC | Age: 46
End: 2021-09-13
Payer: COMMERCIAL

## 2021-09-13 VITALS
DIASTOLIC BLOOD PRESSURE: 79 MMHG | WEIGHT: 293 LBS | BODY MASS INDEX: 43.4 KG/M2 | SYSTOLIC BLOOD PRESSURE: 127 MMHG | HEART RATE: 88 BPM | OXYGEN SATURATION: 97 % | RESPIRATION RATE: 18 BRPM | TEMPERATURE: 97.3 F | HEIGHT: 69 IN

## 2021-09-13 DIAGNOSIS — E11.9 TYPE 2 DIABETES MELLITUS WITHOUT COMPLICATION, WITHOUT LONG-TERM CURRENT USE OF INSULIN (HCC): ICD-10-CM

## 2021-09-13 DIAGNOSIS — F33.9 RECURRENT MAJOR DEPRESSIVE DISORDER, REMISSION STATUS UNSPECIFIED (HCC): ICD-10-CM

## 2021-09-13 DIAGNOSIS — D64.9 ANEMIA, UNSPECIFIED TYPE: ICD-10-CM

## 2021-09-13 DIAGNOSIS — E66.01 MORBID OBESITY (HCC): ICD-10-CM

## 2021-09-13 DIAGNOSIS — F41.9 ANXIETY: ICD-10-CM

## 2021-09-13 DIAGNOSIS — Z01.818 PREOPERATIVE GENERAL PHYSICAL EXAMINATION: Primary | ICD-10-CM

## 2021-09-13 DIAGNOSIS — M17.11 PRIMARY OSTEOARTHRITIS OF RIGHT KNEE: ICD-10-CM

## 2021-09-13 PROCEDURE — 99214 OFFICE O/P EST MOD 30 MIN: CPT | Performed by: FAMILY MEDICINE

## 2021-09-13 RX ORDER — GLIPIZIDE 10 MG/1
10 TABLET, FILM COATED, EXTENDED RELEASE ORAL DAILY
Qty: 3090 TABLET | Refills: 1 | Status: SHIPPED | OUTPATIENT
Start: 2021-09-13 | End: 2021-11-29 | Stop reason: SDUPTHER

## 2021-09-13 RX ORDER — LIRAGLUTIDE 6 MG/ML
0.6 INJECTION SUBCUTANEOUS DAILY
Qty: 9 ADJUSTABLE DOSE PRE-FILLED PEN SYRINGE | Refills: 1 | Status: SHIPPED
Start: 2021-09-13 | End: 2021-09-14
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
## 2021-09-13 NOTE — TELEPHONE ENCOUNTER
[facility] pharmacist called requesting clarification on dosing for Victoza prescribed today, stating pt is already taking 1.8 mg and this would increase her dose to 2.4 mg, which is over the recommended daily dose. Please call to confirm correct dose.  Aline

## 2021-09-14 ENCOUNTER — TELEPHONE (OUTPATIENT)
Dept: FAMILY MEDICINE CLINIC | Age: 46
End: 2021-09-14

## 2021-09-14 NOTE — TELEPHONE ENCOUNTER
Pt medication  has been address in another encounter. Called pt, and advised per Dr. Lenny Maria message, from 09/14/2021, she is unable to clear her for surgery. Pt states Nope, Nope. She is in too much pain, and she needs to be cleared. Pt disconnected call.

## 2021-09-14 NOTE — TELEPHONE ENCOUNTER
Pt requesting call back to advise if Dr. Adrián Benavides is going to approve or deny her for surgery, noting she saw her Hgb A1C has gone up to 11 and needs to know if she will be cleared or not. Please advise as soon as possible since pt will need to cancel her Covid test and surgery if not cleared.  Aline

## 2021-09-14 NOTE — TELEPHONE ENCOUNTER
----- Message from Darwyn Romberg sent at 9/14/2021  4:09 PM EDT -----  Regarding: Dr. Gisel Castillo: [phone]  General Message/Vendor Calls    Caller's first and last name: Pt.       Reason for call: Calling to see if paperwork being filled out by pcp is ready for . Callback required yes/no and why: Yes, needs paperwork. Best contact number(s): [phone]       Details to clarify the request: N/a.       Darwyn Romberg

## 2021-09-14 NOTE — TELEPHONE ENCOUNTER
I am unable to clear her. She needs to schedule an appointment with me and have her blood sugars ready.

## 2021-09-14 NOTE — TELEPHONE ENCOUNTER
Left a prescription dosage verification message on voicemail for [facility] pharmacist per Dr Doron Shaw The correct dose is 1.8 mg and I told her this yesterday.

## 2021-09-15 ENCOUNTER — TELEPHONE (OUTPATIENT)
Dept: ORTHOPEDIC SURGERY | Age: 46
End: 2021-09-15

## 2021-09-15 NOTE — TELEPHONE ENCOUNTER
Contacted patient per Dr. Flavio Villareal to advise that d/t pt's A1C surgery would have to be postponed. Advised patient that we could reschedule once her A1C is at an acceptable range. She will reach out once she schedules follow up with PCP

## 2021-09-16 ENCOUNTER — TELEPHONE (OUTPATIENT)
Dept: ORTHOPEDIC SURGERY | Age: 46
End: 2021-09-16

## 2021-09-16 ENCOUNTER — HOSPITAL ENCOUNTER (OUTPATIENT)
Dept: PREADMISSION TESTING | Age: 46
Discharge: HOME | End: 2021-09-16

## 2021-09-16 NOTE — TELEPHONE ENCOUNTER
Patient would like to know if she is able to try any other types of treatment since she can not have surgery right now d/t her A1C. She says you mentioned PRP during the visit. Wants to know if that is still an option, or if there is anything else we can try as she is going on vacation next month. Please advise.

## 2021-09-17 DIAGNOSIS — S83.231D COMPLEX TEAR OF MEDIAL MENISCUS OF RIGHT KNEE, SUBSEQUENT ENCOUNTER: Primary | ICD-10-CM

## 2021-09-17 RX ORDER — TRAMADOL HYDROCHLORIDE 50 MG/1
50 TABLET ORAL
Qty: 28 TABLET | Refills: 0 | Status: SHIPPED | OUTPATIENT
Start: 2021-09-17 | End: 2021-09-24

## 2021-09-20 ENCOUNTER — TELEPHONE (OUTPATIENT)
Dept: FAMILY MEDICINE CLINIC | Age: 46
End: 2021-09-20

## 2021-09-20 DIAGNOSIS — E11.65 UNCONTROLLED TYPE 2 DIABETES MELLITUS WITH HYPERGLYCEMIA (HCC): Primary | ICD-10-CM

## 2021-09-20 NOTE — TELEPHONE ENCOUNTER
----- Message from Brianna Andrea sent at 9/20/2021 11:28 AM EDT -----  Regarding: Dr Ever Ramon  Patient return call    Caller's first and last name and relationship (if not the patient):pt      Best contact number(s):[phone]      Whose call is being returned:Nurse      Details to clarify the request: Pt is requesting a call back and advised she received a message about lab, but unsure exactly what action is being requested.        Brianna Andrea

## 2021-09-20 NOTE — TELEPHONE ENCOUNTER
Returned pt's call. Pt extremely upset, states she can not continue to take off work and pay co-pays for visit. Pt states she is in externe pain  And needs something done. Pt asks for Dr. Barbara Mohr to call her directly.

## 2021-09-21 NOTE — TELEPHONE ENCOUNTER
Returned patient's call. She is frustrated because, despite trying, she cannot figure out why her A1C is going up. Her sugars are all over the place and she is not doing anything different. Explained a pharm D referral and she is agreeable. Referral order written.

## 2021-10-04 ENCOUNTER — TELEPHONE (OUTPATIENT)
Dept: FAMILY MEDICINE CLINIC | Age: 46
End: 2021-10-04

## 2021-10-04 NOTE — TELEPHONE ENCOUNTER
Pt requesting call back to advise if Dr. Tiffanie Perdomo can prescribe something stronger than Tramadol for severe right knee pain, swelling, now with numbness of right thigh and calf caused by torn meniscus and ACL.  Meirm

## 2021-10-24 ENCOUNTER — HOSPITAL ENCOUNTER (INPATIENT)
Age: 46
LOS: 4 days | Discharge: HOME | DRG: 871 | End: 2021-10-28
Attending: EMERGENCY MEDICINE | Admitting: INTERNAL MEDICINE
Payer: COMMERCIAL

## 2021-10-24 ENCOUNTER — APPOINTMENT (OUTPATIENT)
Dept: CT IMAGING | Age: 46
DRG: 871 | End: 2021-10-24
Attending: EMERGENCY MEDICINE
Payer: COMMERCIAL

## 2021-10-24 DIAGNOSIS — N12 PYELONEPHRITIS: Primary | ICD-10-CM

## 2021-10-24 DIAGNOSIS — A41.9 SEPSIS WITHOUT ACUTE ORGAN DYSFUNCTION, DUE TO UNSPECIFIED ORGANISM (HCC): ICD-10-CM

## 2021-10-24 LAB
ALBUMIN SERPL-MCNC: 3.5 G/DL (ref 3.5–5)
ALBUMIN/GLOB SERPL: 0.8 {RATIO} (ref 1.1–2.2)
ALP SERPL-CCNC: 94 U/L (ref 45–117)
ALT SERPL-CCNC: 58 U/L (ref 12–78)
ANION GAP SERPL CALC-SCNC: 4 MMOL/L (ref 5–15)
APPEARANCE UR: ABNORMAL
AST SERPL-CCNC: 40 U/L (ref 15–37)
BACTERIA URNS QL MICRO: ABNORMAL /HPF
BASE DEFICIT BLD-SCNC: 1.5 MMOL/L
BASOPHILS # BLD: 0 K/UL (ref 0–0.1)
BASOPHILS NFR BLD: 0 % (ref 0–1)
BILIRUB SERPL-MCNC: 1.4 MG/DL (ref 0.2–1)
BILIRUB UR QL: NEGATIVE
BUN SERPL-MCNC: 10 MG/DL (ref 6–20)
BUN/CREAT SERPL: 9 (ref 12–20)
CA-I BLD-MCNC: 1.06 MMOL/L (ref 1.12–1.32)
CALCIUM SERPL-MCNC: 9.1 MG/DL (ref 8.5–10.1)
CHLORIDE BLD-SCNC: 100 MMOL/L (ref 100–108)
CHLORIDE SERPL-SCNC: 97 MMOL/L (ref 97–108)
CO2 BLD-SCNC: 24 MMOL/L (ref 19–24)
CO2 SERPL-SCNC: 27 MMOL/L (ref 21–32)
COLOR UR: ABNORMAL
COVID-19 RAPID TEST, COVR: NOT DETECTED
CREAT SERPL-MCNC: 1.14 MG/DL (ref 0.55–1.02)
CREAT UR-MCNC: 0.6 MG/DL (ref 0.6–1.3)
D DIMER PPP FEU-MCNC: 1.9 MG/L FEU (ref 0–0.65)
DIFFERENTIAL METHOD BLD: ABNORMAL
EOSINOPHIL # BLD: 0 K/UL (ref 0–0.4)
EOSINOPHIL NFR BLD: 0 % (ref 0–7)
EPITH CASTS URNS QL MICRO: ABNORMAL /LPF
ERYTHROCYTE [DISTWIDTH] IN BLOOD BY AUTOMATED COUNT: 13.6 % (ref 11.5–14.5)
GLOBULIN SER CALC-MCNC: 4.6 G/DL (ref 2–4)
GLUCOSE BLD STRIP.AUTO-MCNC: 229 MG/DL (ref 74–106)
GLUCOSE SERPL-MCNC: 288 MG/DL (ref 65–100)
GLUCOSE UR STRIP.AUTO-MCNC: >1000 MG/DL
HCO3 BLDA-SCNC: 23 MMOL/L
HCT VFR BLD AUTO: 45.9 % (ref 35–47)
HGB BLD-MCNC: 14.8 G/DL (ref 11.5–16)
HGB UR QL STRIP: ABNORMAL
IMM GRANULOCYTES # BLD AUTO: 0.1 K/UL (ref 0–0.04)
IMM GRANULOCYTES NFR BLD AUTO: 1 % (ref 0–0.5)
KETONES UR QL STRIP.AUTO: ABNORMAL MG/DL
LACTATE BLD-SCNC: 1.62 MMOL/L (ref 0.4–2)
LACTATE BLD-SCNC: 2.12 MMOL/L (ref 0.4–2)
LEUKOCYTE ESTERASE UR QL STRIP.AUTO: ABNORMAL
LYMPHOCYTES # BLD: 1.1 K/UL (ref 0.8–3.5)
LYMPHOCYTES NFR BLD: 6 % (ref 12–49)
MCH RBC QN AUTO: 27.4 PG (ref 26–34)
MCHC RBC AUTO-ENTMCNC: 32.2 G/DL (ref 30–36.5)
MCV RBC AUTO: 84.8 FL (ref 80–99)
MONOCYTES # BLD: 0.9 K/UL (ref 0–1)
MONOCYTES NFR BLD: 5 % (ref 5–13)
NEUTS SEG # BLD: 16 K/UL (ref 1.8–8)
NEUTS SEG NFR BLD: 88 % (ref 32–75)
NITRITE UR QL STRIP.AUTO: POSITIVE
NRBC # BLD: 0 K/UL (ref 0–0.01)
NRBC BLD-RTO: 0 PER 100 WBC
PCO2 BLDV: 39.4 MMHG (ref 41–51)
PH BLDV: 7.38 [PH] (ref 7.32–7.42)
PH UR STRIP: 6.5 [PH] (ref 5–8)
PLATELET # BLD AUTO: 183 K/UL (ref 150–400)
PMV BLD AUTO: 11.5 FL (ref 8.9–12.9)
PO2 BLDV: 23 MMHG (ref 25–40)
POTASSIUM BLD-SCNC: 4.2 MMOL/L (ref 3.5–5.5)
POTASSIUM SERPL-SCNC: 4 MMOL/L (ref 3.5–5.1)
PROT SERPL-MCNC: 8.1 G/DL (ref 6.4–8.2)
PROT UR STRIP-MCNC: 100 MG/DL
RBC # BLD AUTO: 5.41 M/UL (ref 3.8–5.2)
RBC #/AREA URNS HPF: ABNORMAL /HPF (ref 0–5)
SODIUM BLD-SCNC: 135 MMOL/L (ref 136–145)
SODIUM SERPL-SCNC: 128 MMOL/L (ref 136–145)
SODIUM SERPL-SCNC: 129 MMOL/L (ref 136–145)
SOURCE, COVRS: NORMAL
SP GR UR REFRACTOMETRY: 1.01 (ref 1–1.03)
SPECIMEN SITE: ABNORMAL
TROPONIN-HIGH SENSITIVITY: <4 NG/L (ref 0–51)
UROBILINOGEN UR QL STRIP.AUTO: 1 EU/DL (ref 0.2–1)
WBC # BLD AUTO: 18.1 K/UL (ref 3.6–11)
WBC URNS QL MICRO: ABNORMAL /HPF (ref 0–4)

## 2021-10-24 PROCEDURE — 36415 COLL VENOUS BLD VENIPUNCTURE: CPT

## 2021-10-24 PROCEDURE — 83605 ASSAY OF LACTIC ACID: CPT

## 2021-10-24 PROCEDURE — 85379 FIBRIN DEGRADATION QUANT: CPT

## 2021-10-24 PROCEDURE — 74011250636 HC RX REV CODE- 250/636: Performed by: EMERGENCY MEDICINE

## 2021-10-24 PROCEDURE — 84295 ASSAY OF SERUM SODIUM: CPT

## 2021-10-24 PROCEDURE — 87040 BLOOD CULTURE FOR BACTERIA: CPT

## 2021-10-24 PROCEDURE — 87077 CULTURE AEROBIC IDENTIFY: CPT

## 2021-10-24 PROCEDURE — 96365 THER/PROPH/DIAG IV INF INIT: CPT

## 2021-10-24 PROCEDURE — 84484 ASSAY OF TROPONIN QUANT: CPT

## 2021-10-24 PROCEDURE — 93005 ELECTROCARDIOGRAM TRACING: CPT

## 2021-10-24 PROCEDURE — 85025 COMPLETE CBC W/AUTO DIFF WBC: CPT

## 2021-10-24 PROCEDURE — 87186 SC STD MICRODIL/AGAR DIL: CPT

## 2021-10-24 PROCEDURE — 99284 EMERGENCY DEPT VISIT MOD MDM: CPT

## 2021-10-24 PROCEDURE — 96375 TX/PRO/DX INJ NEW DRUG ADDON: CPT

## 2021-10-24 PROCEDURE — 74011000636 HC RX REV CODE- 636: Performed by: EMERGENCY MEDICINE

## 2021-10-24 PROCEDURE — 81001 URINALYSIS AUTO W/SCOPE: CPT

## 2021-10-24 PROCEDURE — 74011000258 HC RX REV CODE- 258: Performed by: EMERGENCY MEDICINE

## 2021-10-24 PROCEDURE — 74177 CT ABD & PELVIS W/CONTRAST: CPT

## 2021-10-24 PROCEDURE — 74011250637 HC RX REV CODE- 250/637: Performed by: EMERGENCY MEDICINE

## 2021-10-24 PROCEDURE — 87635 SARS-COV-2 COVID-19 AMP PRB: CPT

## 2021-10-24 PROCEDURE — 65660000000 HC RM CCU STEPDOWN

## 2021-10-24 PROCEDURE — 74011250636 HC RX REV CODE- 250/636: Performed by: INTERNAL MEDICINE

## 2021-10-24 PROCEDURE — 74011000258 HC RX REV CODE- 258: Performed by: INTERNAL MEDICINE

## 2021-10-24 PROCEDURE — 87086 URINE CULTURE/COLONY COUNT: CPT

## 2021-10-24 PROCEDURE — 80053 COMPREHEN METABOLIC PANEL: CPT

## 2021-10-24 RX ORDER — ONDANSETRON 2 MG/ML
4 INJECTION INTRAMUSCULAR; INTRAVENOUS
Status: COMPLETED | OUTPATIENT
Start: 2021-10-24 | End: 2021-10-24

## 2021-10-24 RX ORDER — MAGNESIUM SULFATE 100 %
4 CRYSTALS MISCELLANEOUS AS NEEDED
Status: DISCONTINUED | OUTPATIENT
Start: 2021-10-24 | End: 2021-10-28 | Stop reason: HOSPADM

## 2021-10-24 RX ORDER — ACETAMINOPHEN 325 MG/1
650 TABLET ORAL
Status: DISCONTINUED | OUTPATIENT
Start: 2021-10-24 | End: 2021-10-28 | Stop reason: HOSPADM

## 2021-10-24 RX ORDER — SODIUM CHLORIDE 9 MG/ML
100 INJECTION, SOLUTION INTRAVENOUS CONTINUOUS
Status: DISCONTINUED | OUTPATIENT
Start: 2021-10-24 | End: 2021-10-26

## 2021-10-24 RX ORDER — MORPHINE SULFATE 2 MG/ML
2 INJECTION, SOLUTION INTRAMUSCULAR; INTRAVENOUS
Status: DISCONTINUED | OUTPATIENT
Start: 2021-10-24 | End: 2021-10-28 | Stop reason: HOSPADM

## 2021-10-24 RX ORDER — ACETAMINOPHEN 500 MG
1000 TABLET ORAL
Status: COMPLETED | OUTPATIENT
Start: 2021-10-24 | End: 2021-10-24

## 2021-10-24 RX ORDER — ONDANSETRON 2 MG/ML
4 INJECTION INTRAMUSCULAR; INTRAVENOUS
Status: DISCONTINUED | OUTPATIENT
Start: 2021-10-24 | End: 2021-10-28 | Stop reason: HOSPADM

## 2021-10-24 RX ORDER — SODIUM CHLORIDE 0.9 % (FLUSH) 0.9 %
5-40 SYRINGE (ML) INJECTION AS NEEDED
Status: DISCONTINUED | OUTPATIENT
Start: 2021-10-24 | End: 2021-10-28 | Stop reason: HOSPADM

## 2021-10-24 RX ORDER — MORPHINE SULFATE 2 MG/ML
1 INJECTION, SOLUTION INTRAMUSCULAR; INTRAVENOUS ONCE
Status: COMPLETED | OUTPATIENT
Start: 2021-10-24 | End: 2021-10-24

## 2021-10-24 RX ORDER — ENOXAPARIN SODIUM 100 MG/ML
40 INJECTION SUBCUTANEOUS EVERY 12 HOURS
Status: DISCONTINUED | OUTPATIENT
Start: 2021-10-24 | End: 2021-10-28 | Stop reason: HOSPADM

## 2021-10-24 RX ORDER — INSULIN LISPRO 100 [IU]/ML
INJECTION, SOLUTION INTRAVENOUS; SUBCUTANEOUS
Status: DISCONTINUED | OUTPATIENT
Start: 2021-10-25 | End: 2021-10-28 | Stop reason: HOSPADM

## 2021-10-24 RX ORDER — SODIUM CHLORIDE 0.9 % (FLUSH) 0.9 %
5-10 SYRINGE (ML) INJECTION AS NEEDED
Status: DISCONTINUED | OUTPATIENT
Start: 2021-10-24 | End: 2021-10-28 | Stop reason: HOSPADM

## 2021-10-24 RX ORDER — SODIUM CHLORIDE 0.9 % (FLUSH) 0.9 %
5-40 SYRINGE (ML) INJECTION EVERY 8 HOURS
Status: DISCONTINUED | OUTPATIENT
Start: 2021-10-24 | End: 2021-10-28 | Stop reason: HOSPADM

## 2021-10-24 RX ORDER — POLYETHYLENE GLYCOL 3350 17 G/17G
17 POWDER, FOR SOLUTION ORAL DAILY PRN
Status: DISCONTINUED | OUTPATIENT
Start: 2021-10-24 | End: 2021-10-28 | Stop reason: HOSPADM

## 2021-10-24 RX ORDER — DEXTROSE 50 % IN WATER (D50W) INTRAVENOUS SYRINGE
25-50 AS NEEDED
Status: DISCONTINUED | OUTPATIENT
Start: 2021-10-24 | End: 2021-10-28 | Stop reason: HOSPADM

## 2021-10-24 RX ADMIN — SODIUM CHLORIDE 100 ML/HR: 9 INJECTION, SOLUTION INTRAVENOUS at 20:16

## 2021-10-24 RX ADMIN — SODIUM CHLORIDE 1000 ML: 9 INJECTION, SOLUTION INTRAVENOUS at 17:18

## 2021-10-24 RX ADMIN — MORPHINE SULFATE 1 MG: 2 INJECTION, SOLUTION INTRAMUSCULAR; INTRAVENOUS at 20:11

## 2021-10-24 RX ADMIN — IOPAMIDOL 100 ML: 755 INJECTION, SOLUTION INTRAVENOUS at 16:58

## 2021-10-24 RX ADMIN — SODIUM CHLORIDE 986 ML: 9 INJECTION, SOLUTION INTRAVENOUS at 20:12

## 2021-10-24 RX ADMIN — ACETAMINOPHEN 650 MG: 325 TABLET ORAL at 21:53

## 2021-10-24 RX ADMIN — ONDANSETRON 4 MG: 2 INJECTION INTRAMUSCULAR; INTRAVENOUS at 20:12

## 2021-10-24 RX ADMIN — ONDANSETRON 4 MG: 2 INJECTION INTRAMUSCULAR; INTRAVENOUS at 15:30

## 2021-10-24 RX ADMIN — ENOXAPARIN SODIUM 40 MG: 100 INJECTION SUBCUTANEOUS at 21:54

## 2021-10-24 RX ADMIN — PIPERACILLIN AND TAZOBACTAM 3.38 G: 3; .375 INJECTION, POWDER, LYOPHILIZED, FOR SOLUTION INTRAVENOUS at 23:35

## 2021-10-24 RX ADMIN — SODIUM CHLORIDE 500 ML: 9 INJECTION, SOLUTION INTRAVENOUS at 20:16

## 2021-10-24 RX ADMIN — CEFTRIAXONE SODIUM 2 G: 2 INJECTION, POWDER, FOR SOLUTION INTRAMUSCULAR; INTRAVENOUS at 15:36

## 2021-10-24 RX ADMIN — AZITHROMYCIN MONOHYDRATE 500 MG: 500 INJECTION, POWDER, LYOPHILIZED, FOR SOLUTION INTRAVENOUS at 19:26

## 2021-10-24 RX ADMIN — ACETAMINOPHEN 1000 MG: 500 TABLET, FILM COATED ORAL at 15:36

## 2021-10-24 RX ADMIN — MORPHINE SULFATE 1 MG: 2 INJECTION, SOLUTION INTRAMUSCULAR; INTRAVENOUS at 21:42

## 2021-10-24 RX ADMIN — SODIUM CHLORIDE 1000 ML: 9 INJECTION, SOLUTION INTRAVENOUS at 15:36

## 2021-10-24 NOTE — ED NOTES
Shift Note    Bedside shift change report given to Abimael Resendez (oncoming nurse) by Sasha Vidal RN (offgoing nurse). Report included the following information SBAR, Kardex and ED Summary    Activity:     Ambulatory at baseline:   Yes      Assisted Devices: No        Cardiac:   Cardiac Monitoring: Yes           Access:   Current line(s): PIV     Genitourinary:   Urinary status: voiding    Respiratory:   O2 Device: None (Room air)  Chronic home O2 use?: NO       GI:     Current diet:  ADULT DIET Regular  Passing flatus: YES  Tolerating current diet: YES       Pain Management:   Patient states pain is manageable on current regimen: NO    Skin:     Interventions: nutritional support     Patient Safety:  Fall Score:    Interventions: pt to call before getting OOB       Length of Stay:  Expected LOS: - - -  Actual LOS: 0      Sasha Vidal RN

## 2021-10-24 NOTE — ED NOTES
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
## 2021-10-25 LAB
ANION GAP SERPL CALC-SCNC: 5 MMOL/L (ref 5–15)
ATRIAL RATE: 129 BPM
BUN SERPL-MCNC: 9 MG/DL (ref 6–20)
BUN/CREAT SERPL: 9 (ref 12–20)
CALCIUM SERPL-MCNC: 8.2 MG/DL (ref 8.5–10.1)
CALCULATED P AXIS, ECG09: 0 DEGREES
CALCULATED R AXIS, ECG10: 4 DEGREES
CALCULATED T AXIS, ECG11: 68 DEGREES
CHLORIDE SERPL-SCNC: 100 MMOL/L (ref 97–108)
CO2 SERPL-SCNC: 25 MMOL/L (ref 21–32)
CREAT SERPL-MCNC: 1.04 MG/DL (ref 0.55–1.02)
DIAGNOSIS, 93000: NORMAL
ERYTHROCYTE [DISTWIDTH] IN BLOOD BY AUTOMATED COUNT: 14.1 % (ref 11.5–14.5)
GLUCOSE BLD STRIP.AUTO-MCNC: 200 MG/DL (ref 65–117)
GLUCOSE BLD STRIP.AUTO-MCNC: 238 MG/DL (ref 65–117)
GLUCOSE BLD STRIP.AUTO-MCNC: 240 MG/DL (ref 65–117)
GLUCOSE BLD STRIP.AUTO-MCNC: 243 MG/DL (ref 65–117)
GLUCOSE SERPL-MCNC: 270 MG/DL (ref 65–100)
HCT VFR BLD AUTO: 38.1 % (ref 35–47)
HGB BLD-MCNC: 12.5 G/DL (ref 11.5–16)
MCH RBC QN AUTO: 27.4 PG (ref 26–34)
MCHC RBC AUTO-ENTMCNC: 32.8 G/DL (ref 30–36.5)
MCV RBC AUTO: 83.4 FL (ref 80–99)
NRBC # BLD: 0 K/UL (ref 0–0.01)
NRBC BLD-RTO: 0 PER 100 WBC
P-R INTERVAL, ECG05: 118 MS
PLATELET # BLD AUTO: 152 K/UL (ref 150–400)
PMV BLD AUTO: 11.6 FL (ref 8.9–12.9)
POTASSIUM SERPL-SCNC: 4 MMOL/L (ref 3.5–5.1)
Q-T INTERVAL, ECG07: 314 MS
QRS DURATION, ECG06: 88 MS
QTC CALCULATION (BEZET), ECG08: 460 MS
RBC # BLD AUTO: 4.57 M/UL (ref 3.8–5.2)
SERVICE CMNT-IMP: ABNORMAL
SODIUM SERPL-SCNC: 130 MMOL/L (ref 136–145)
VENTRICULAR RATE, ECG03: 129 BPM
WBC # BLD AUTO: 17.2 K/UL (ref 3.6–11)

## 2021-10-25 PROCEDURE — 74011250636 HC RX REV CODE- 250/636: Performed by: INTERNAL MEDICINE

## 2021-10-25 PROCEDURE — 36415 COLL VENOUS BLD VENIPUNCTURE: CPT

## 2021-10-25 PROCEDURE — 82962 GLUCOSE BLOOD TEST: CPT

## 2021-10-25 PROCEDURE — 74011250636 HC RX REV CODE- 250/636: Performed by: EMERGENCY MEDICINE

## 2021-10-25 PROCEDURE — 85027 COMPLETE CBC AUTOMATED: CPT

## 2021-10-25 PROCEDURE — 74011636637 HC RX REV CODE- 636/637: Performed by: INTERNAL MEDICINE

## 2021-10-25 PROCEDURE — 65660000000 HC RM CCU STEPDOWN

## 2021-10-25 PROCEDURE — 74011250637 HC RX REV CODE- 250/637: Performed by: EMERGENCY MEDICINE

## 2021-10-25 PROCEDURE — 74011250636 HC RX REV CODE- 250/636: Performed by: GENERAL ACUTE CARE HOSPITAL

## 2021-10-25 PROCEDURE — 80048 BASIC METABOLIC PNL TOTAL CA: CPT

## 2021-10-25 PROCEDURE — 74011000258 HC RX REV CODE- 258: Performed by: INTERNAL MEDICINE

## 2021-10-25 RX ADMIN — ONDANSETRON 4 MG: 2 INJECTION INTRAMUSCULAR; INTRAVENOUS at 05:50

## 2021-10-25 RX ADMIN — MORPHINE SULFATE 2 MG: 2 INJECTION, SOLUTION INTRAMUSCULAR; INTRAVENOUS at 13:33

## 2021-10-25 RX ADMIN — MORPHINE SULFATE 2 MG: 2 INJECTION, SOLUTION INTRAMUSCULAR; INTRAVENOUS at 05:54

## 2021-10-25 RX ADMIN — Medication 10 ML: at 13:25

## 2021-10-25 RX ADMIN — INSULIN LISPRO 2 UNITS: 100 INJECTION, SOLUTION INTRAVENOUS; SUBCUTANEOUS at 20:51

## 2021-10-25 RX ADMIN — AZITHROMYCIN MONOHYDRATE 500 MG: 500 INJECTION, POWDER, LYOPHILIZED, FOR SOLUTION INTRAVENOUS at 20:52

## 2021-10-25 RX ADMIN — ENOXAPARIN SODIUM 40 MG: 100 INJECTION SUBCUTANEOUS at 09:14

## 2021-10-25 RX ADMIN — PIPERACILLIN AND TAZOBACTAM 3.38 G: 3; .375 INJECTION, POWDER, LYOPHILIZED, FOR SOLUTION INTRAVENOUS at 23:05

## 2021-10-25 RX ADMIN — MORPHINE SULFATE 2 MG: 2 INJECTION, SOLUTION INTRAMUSCULAR; INTRAVENOUS at 17:22

## 2021-10-25 RX ADMIN — ACETAMINOPHEN 650 MG: 325 TABLET ORAL at 04:21

## 2021-10-25 RX ADMIN — PIPERACILLIN AND TAZOBACTAM 3.38 G: 3; .375 INJECTION, POWDER, LYOPHILIZED, FOR SOLUTION INTRAVENOUS at 07:13

## 2021-10-25 RX ADMIN — PIPERACILLIN AND TAZOBACTAM 3.38 G: 3; .375 INJECTION, POWDER, LYOPHILIZED, FOR SOLUTION INTRAVENOUS at 16:48

## 2021-10-25 RX ADMIN — INSULIN LISPRO 3 UNITS: 100 INJECTION, SOLUTION INTRAVENOUS; SUBCUTANEOUS at 09:13

## 2021-10-25 RX ADMIN — ONDANSETRON 4 MG: 2 INJECTION INTRAMUSCULAR; INTRAVENOUS at 17:22

## 2021-10-25 RX ADMIN — INSULIN LISPRO 3 UNITS: 100 INJECTION, SOLUTION INTRAVENOUS; SUBCUTANEOUS at 16:30

## 2021-10-25 RX ADMIN — INSULIN LISPRO 3 UNITS: 100 INJECTION, SOLUTION INTRAVENOUS; SUBCUTANEOUS at 13:24

## 2021-10-25 RX ADMIN — ONDANSETRON 4 MG: 2 INJECTION INTRAMUSCULAR; INTRAVENOUS at 02:01

## 2021-10-25 RX ADMIN — ACETAMINOPHEN 650 MG: 325 TABLET ORAL at 13:25

## 2021-10-25 RX ADMIN — Medication 10 ML: at 20:59

## 2021-10-25 RX ADMIN — ONDANSETRON 4 MG: 2 INJECTION INTRAMUSCULAR; INTRAVENOUS at 13:25

## 2021-10-25 RX ADMIN — ACETAMINOPHEN 650 MG: 325 TABLET ORAL at 19:29

## 2021-10-25 RX ADMIN — ENOXAPARIN SODIUM 40 MG: 100 INJECTION SUBCUTANEOUS at 20:52

## 2021-10-25 NOTE — ED NOTES
Patient is being transferred to [address], Room # 61 51 81. Report given to Michael Conner on [employer] for routine progression of care. Report consisted of the following information SBAR, ED Summary, Intake/Output, MAR and Recent Results. Patient transferred to receiving unit by: Alethea Villareal (RN or tech name). Outstanding consults needed: No     Next labs due: No     The following personal items will be sent with the patient during transfer to the floor: All valuables:    Cardiac monitoring ordered: Yes    The following CURRENT information was reported to the receiving RN:    Code status: Full Code at time of transfer    Last set of vital signs:  Vital Signs  Level of Consciousness: Alert (0) (10/25/21 0439)  Temp: (!) 101.8 °F (38.8 °C) (10/25/21 1220)  Temp Source: Axillary (10/25/21 0439)  Pulse (Heart Rate): (!) 114 (10/25/21 1220)  Cardiac Rhythm: Sinus Tachy (10/25/21 0439)  Resp Rate: 24 (10/25/21 1220)  BP: 124/69 (10/25/21 1222)  MAP (Monitor): 81 (10/25/21 1222)  MAP (Calculated): 87 (10/25/21 1222)  BP 1 Method: Automatic (10/24/21 1433)  MEWS Score: 6 (10/25/21 0439)         Oxygen Therapy  O2 Sat (%): 96 % (10/25/21 1222)  Pulse via Oximetry: 115 beats per minute (10/25/21 1222)  O2 Device: None (Room air) (10/24/21 1623)      Last pain assessment:  Pain 1  Pain Scale 1: Visual  Pain Intensity 1: 0      Wounds: No     Urinary catheter: voiding  Is there a silva order: No     LDAs:       Peripheral IV 10/25/21 Right Antecubital (Active)   Site Assessment Clean, dry, & intact 10/25/21 0231   Phlebitis Assessment 0 10/25/21 0231   Infiltration Assessment 0 10/25/21 0231   Dressing Status Clean, dry, & intact 10/25/21 0231         Opportunity for questions and clarification was provided.     Jimmy Parmar RN

## 2021-10-25 NOTE — PROGRESS NOTES
1455: TRANSFER - IN REPORT:    Verbal report received from [facility] (name) on Winnie Chávez  being received from ED (unit) for routine progression of care      Report consisted of patients Situation, Background, Assessment and   Recommendations(SBAR). Information from the following report(s) SBAR, Kardex, ED Summary, MAR, Recent Results and Cardiac Rhythm Sinus Tachy was reviewed with the receiving nurse. Opportunity for questions and clarification was provided. Assessment completed upon patients arrival to unit and care assumed.

## 2021-10-25 NOTE — REMOTE MONITORING
Spoke with primary RN Cristina Greer regarding 6 hour Sepsis bundle. For any questions or concerns, please feel free to call [phone]. Thank you! [id number] [facility]. Shaka Andres RN, BSN.

## 2021-10-25 NOTE — PROGRESS NOTES
Transition of Care Plan:    RUR:7 %, LOW   Disposition: Home   Follow up appointments:PCP  DME needed: None   Transportation at Discharge: Patient drove herself   [place] or means to access home:  Yes      IM Medicare Letter: N/A commercial insurance   Is patient a BCPI-A Bundle:    N/A       If yes, was Bundle Letter given?:     Caregiver Contact: Patient requested to have her mother removed as emergency contact and did not want anyone added as caregiver or emergency contact   Discharge Caregiver contacted prior to discharge? N/A    Reason for Admission:  Sepsis        RUR Score:   7 %                  Plan for utilizing home health:   None    PCP: First and Last name:  Jenna Vee MD     Name of Practice: [address]     Are you a current patient: Yes/No: Yes   Approximate date of last visit: 21   Can you participate in a virtual visit with your PCP: Yes                    Current Advanced Directive/Advance Care Plan: Full Code  Jenniffer 13 (ACP) Conversation    Date of Conversation: 10/24/2021  Conducted with: Patient with Anisa 153:   No healthcare decision makers have been documented. Click here to complete [address] including selection of the Healthcare Decision Maker Relationship (ie \"Primary\")      Content/Action Overview:   DECLINED ACP conversation - will revisit periodically   Reviewed DNR/DNI and patient elects Full Code (Attempt Resuscitation)    Length of Voluntary ACP Conversation in minutes:  <16 minutes (Non-Billable)    Leticia Drake RN                     Transition of Care Plan:                    CM placed call to patient's room to discuss discharge planning. Patient name, , and demographics all verified in chart. Patient lives in a one story home. Patient requested to have her mother removed from her emergency contacts.  Pt declined to have any one added to her contacts list. Patient Is employed here at [employer]. Patient is independent of her ADLS/IDLS including driving. Patient drove herself to the ED and would like to drive herself home at discharge. Patient does not use any DME at baseline. Patient preferred pharmacy is [facility] in [place]. Patient has no SNF, HH, or IPR history. Care Management Interventions  PCP Verified by CM: Yes  Last Visit to PCP: 09/13/21  Mode of Transport at Discharge: Self (Patient drove herself to ED would like to drive home if medically able to do so )  Transition of Care Consult (CM Consult): Discharge Planning  Discharge Durable Medical Equipment: No  Physical Therapy Consult: No  Occupational Therapy Consult: No  Speech Therapy Consult: No  Support Systems: Other Family Member(s)  Confirm Follow Up Transport: Self  Discharge Location  Discharge Placement: Home    Unit CM to continue to follow for discharge needs and planning.     Tammy Kinsey, BSN, RN, BSW   RN Care Manager

## 2021-10-25 NOTE — PROGRESS NOTES
10/25/21 1535   Vitals   Temp 99.1 °F (37.3 °C)   Temp Source Oral   Pulse (Heart Rate) (!) 105   Resp Rate 22   O2 Sat (%) 97 %   Level of Consciousness Alert (0)   /67   MAP (Monitor) 80   MAP (Calculated) 82   MEWS Score 3   MD aware of tachycardia. Admitted with sepsis.

## 2021-10-25 NOTE — PROGRESS NOTES
Dr Latosha Burogs aware that the patient remains sinsu tach in 118, febrile 102.6 (axillary). Patient given tylenol. She is A&0x4. She denies chest pain or shortness of breath.   Will continue to assess patient, she remains on cardiac monitor

## 2021-10-25 NOTE — PROGRESS NOTES
Called Dr. Jamilah Hoang to make aware of patient's elevated d-dimer. He was aware and gave no further orders.

## 2021-10-25 NOTE — PROGRESS NOTES
Hospitalist Progress Note    NAME: Shon Christiansen   :  1975   MRN:  [id number]       Assessment / Plan:    Sepsis secondary to pyelonephritis and community-acquired pneumonia, POA  To rule out COVID-19 pneumonia, POA  Acute hypoxic respiratory failure, POA  Presented with shortness of breath, fever, leukocytosis requiring oxygen via nasal cannula meeting criteria for sepsis and acute hypoxic respiratory failure  CT abdomen with pyelonephritis and lower lobe pneumonia  Currently on 2 L oxygen via nasal cannula  Continue azithromycin and Zosyn  Continue IV fluids  COVID-19 rapid test negative  Unvaccinated patient against COVID-19   Send respiratory film panel to rule out COVID-19 and influenza, rhinovirus, or RSV  Wean off oxygen as tolerated  Follow blood cultures  UA was significant pyuria and bacteriuria  Urine culture positive for gram-negative rods, continue to monitor    Diabetes mellitus type 2  Correction scale and hypoglycemia protocol    Chronic sciatica  Right lower extremity numbness and chronic sciatica as reported by patient  No other focal neurologic symptoms  Patient reported her symptoms gets better on stretching exercises  We will order PT OT  I offered gabapentin or Lyrica however patient declined    Hypovolemic hyponatremia  Elevated creatinine  Due to decreased oral intake  Continue IV fluids  Sodium levels are improving  Creatinine is back to baseline    Morbid obesity  BMI 53.07  Counseling was provided about weight loss        40 or above Morbid obesity / Body mass index is 53.07 kg/m². Estimated discharge date:   Barriers: Clinical improvement    Code status: Full  Prophylaxis: Lovenox  Recommended Disposition: Home w/Family     Subjective:     Patient was seen and examined. No acute events overnight. Discussed with RN overnight events. All patient's questions were answered.     \"I am not doing well because I do not feel comfortable in this room\"    Review of Systems:  Symptom Y/N Comments  Symptom Y/N Comments   Fever/Chills n   Chest Pain n    Poor Appetite    Edema     Cough n   Abdominal Pain n    Sputum    Joint Pain     SOB/SHAFFER y   Pruritis/Rash     Nausea/vomit n   Tolerating PT/OT     Diarrhea    Tolerating Diet y    Constipation    Other       Could NOT obtain due to:          Objective:     VITALS:   Last 24hrs VS reviewed since prior progress note. Most recent are:  Patient Vitals for the past 24 hrs:   Temp Pulse Resp BP SpO2   10/25/21 1222    124/69 96 %   10/25/21 1220 (!) 101.8 °F (38.8 °C) (!) 114 24 124/69 100 %   10/25/21 0439 (!) 102.6 °F (39.2 °C) (!) 119 28 119/79 96 %   10/25/21 0030 (!) 102.6 °F (39.2 °C) (!) 120 19 (!) 142/82 94 %   10/24/21 2200  (!) 131 22 100/80 93 %   10/24/21 2145  (!) 135 30  92 %   10/24/21 2130 (!) 103 °F (39.4 °C) (!) 137 24  93 %   10/24/21 2115  (!) 126 25  94 %   10/24/21 2100  (!) 126 (!) 33  96 %   10/24/21 2045  (!) 130 (!) 32  92 %   10/24/21 2030  (!) 131 29  96 %   10/24/21 2015  (!) 137 30  97 %   10/24/21 2000  (!) 131 27  98 %   10/24/21 1945  (!) 128 27  98 %   10/24/21 1930  (!) 129 26  (!) 83 %   10/24/21 1915  (!) 126 28 (!) 141/108 100 %   10/24/21 1900  (!) 123 25 (!) 122/94 97 %   10/24/21 1845  (!) 119 22 119/64 96 %   10/24/21 1830  (!) 119 29 112/63 95 %   10/24/21 1623 (!) 101.6 °F (38.7 °C) (!) 133 20 123/72 96 %       Intake/Output Summary (Last 24 hours) at 10/25/2021 1504  Last data filed at 10/25/2021 0439  Gross per 24 hour   Intake 3536 ml   Output 450 ml   Net 3086 ml        I had a face to face encounter and independently examined this patient on 10/25/2021, as outlined below:  PHYSICAL EXAM:  General: WD, WN. Alert, cooperative, no acute distress    EENT:  EOMI. Anicteric sclerae. MMM  Resp:  CTA bilaterally, no wheezing or rales. No accessory muscle use  CV:  Regular  rhythm,  No edema  GI:  Soft, Non distended, Non tender.   +Bowel sounds  Neurologic:  Alert and oriented X 3, normal speech,   Psych:   Good insight. Not anxious nor agitated  Skin:  No rashes. No jaundice    Reviewed most current lab test results and cultures  YES  Reviewed most current radiology test results   YES  Review and summation of old records today    NO  Reviewed patient's current orders and MAR    YES  PMH/SH reviewed - no change compared to H&P  ________________________________________________________________________  Care Plan discussed with:    Comments   Patient x    Family      RN x    Care Manager     Consultant                        Multidiciplinary team rounds were held today with , nursing, pharmacist and clinical coordinator. Patient's plan of care was discussed; medications were reviewed and discharge planning was addressed. ________________________________________________________________________  Total NON critical care TIME:  37   Minutes    Total CRITICAL CARE TIME Spent:   Minutes non procedure based      Comments   >50% of visit spent in counseling and coordination of care     ________________________________________________________________________  Jaison Rondon MD     Procedures: see electronic medical records for all procedures/Xrays and details which were not copied into this note but were reviewed prior to creation of Plan. LABS:  I reviewed today's most current labs and imaging studies.   Pertinent labs include:  Recent Labs     10/25/21  0519 10/24/21  1446   WBC 17.2* 18.1*   HGB 12.5 14.8   HCT 38.1 45.9    183     Recent Labs     10/25/21  0519 10/24/21  2307 10/24/21  1446   * 129* 128*   K 4.0  --  4.0     --  97   CO2 25  --  27   *  --  288*   BUN 9  --  10   CREA 1.04*  --  1.14*   CA 8.2*  --  9.1   ALB  --   --  3.5   TBILI  --   --  1.4*   ALT  --   --  58       Signed: Jaison Rondon MD

## 2021-10-25 NOTE — ED NOTES
Dr Kimmie Womack aware that the patient remains sinus tach in 130's, febrile 103F, and hypoxic at 90%. Pt given tylenol, placed on 2L nasal cannula. She is aaox4, rr are even and unlabored and tachypnic. She denies chest pain or shortness of breath. Will continue to assess patient, and she remains on the cardiac monitor.

## 2021-10-25 NOTE — ED NOTES
Bedside and Verbal shift change report given to [address] [place] [address] (oncoming nurse) by Amelia Pandya RN (offgoing nurse). Report included the following information SBAR, ED Summary, Intake/Output, MAR and Recent Results.

## 2021-10-26 LAB
GLUCOSE BLD STRIP.AUTO-MCNC: 225 MG/DL (ref 65–117)
GLUCOSE BLD STRIP.AUTO-MCNC: 238 MG/DL (ref 65–117)
GLUCOSE BLD STRIP.AUTO-MCNC: 242 MG/DL (ref 65–117)
GLUCOSE BLD STRIP.AUTO-MCNC: 307 MG/DL (ref 65–117)
SERVICE CMNT-IMP: ABNORMAL

## 2021-10-26 PROCEDURE — 74011636637 HC RX REV CODE- 636/637: Performed by: INTERNAL MEDICINE

## 2021-10-26 PROCEDURE — 36415 COLL VENOUS BLD VENIPUNCTURE: CPT

## 2021-10-26 PROCEDURE — 65660000000 HC RM CCU STEPDOWN

## 2021-10-26 PROCEDURE — 87040 BLOOD CULTURE FOR BACTERIA: CPT

## 2021-10-26 PROCEDURE — 82962 GLUCOSE BLOOD TEST: CPT

## 2021-10-26 PROCEDURE — 74011250637 HC RX REV CODE- 250/637: Performed by: EMERGENCY MEDICINE

## 2021-10-26 PROCEDURE — 74011250636 HC RX REV CODE- 250/636: Performed by: GENERAL ACUTE CARE HOSPITAL

## 2021-10-26 PROCEDURE — 74011250636 HC RX REV CODE- 250/636: Performed by: EMERGENCY MEDICINE

## 2021-10-26 PROCEDURE — 74011250636 HC RX REV CODE- 250/636: Performed by: INTERNAL MEDICINE

## 2021-10-26 PROCEDURE — 74011000258 HC RX REV CODE- 258: Performed by: INTERNAL MEDICINE

## 2021-10-26 PROCEDURE — 74011250637 HC RX REV CODE- 250/637: Performed by: NURSE PRACTITIONER

## 2021-10-26 RX ORDER — FACIAL-BODY WIPES
10 EACH TOPICAL DAILY PRN
Status: DISCONTINUED | OUTPATIENT
Start: 2021-10-26 | End: 2021-10-28 | Stop reason: HOSPADM

## 2021-10-26 RX ORDER — INSULIN GLARGINE 100 [IU]/ML
30 INJECTION, SOLUTION SUBCUTANEOUS DAILY
Status: DISCONTINUED | OUTPATIENT
Start: 2021-10-26 | End: 2021-10-27

## 2021-10-26 RX ORDER — POLYETHYLENE GLYCOL 3350 17 G/17G
17 POWDER, FOR SOLUTION ORAL DAILY
Status: DISCONTINUED | OUTPATIENT
Start: 2021-10-26 | End: 2021-10-26

## 2021-10-26 RX ORDER — AMOXICILLIN 250 MG
1 CAPSULE ORAL 2 TIMES DAILY
Status: DISCONTINUED | OUTPATIENT
Start: 2021-10-26 | End: 2021-10-28 | Stop reason: HOSPADM

## 2021-10-26 RX ADMIN — Medication 10 ML: at 14:07

## 2021-10-26 RX ADMIN — Medication 10 ML: at 02:27

## 2021-10-26 RX ADMIN — MORPHINE SULFATE 2 MG: 2 INJECTION, SOLUTION INTRAMUSCULAR; INTRAVENOUS at 08:54

## 2021-10-26 RX ADMIN — ONDANSETRON 4 MG: 2 INJECTION INTRAMUSCULAR; INTRAVENOUS at 15:36

## 2021-10-26 RX ADMIN — INSULIN LISPRO 3 UNITS: 100 INJECTION, SOLUTION INTRAVENOUS; SUBCUTANEOUS at 08:52

## 2021-10-26 RX ADMIN — DOCUSATE SODIUM 50MG AND SENNOSIDES 8.6MG 1 TABLET: 8.6; 5 TABLET, FILM COATED ORAL at 22:01

## 2021-10-26 RX ADMIN — PIPERACILLIN AND TAZOBACTAM 3.38 G: 3; .375 INJECTION, POWDER, LYOPHILIZED, FOR SOLUTION INTRAVENOUS at 06:52

## 2021-10-26 RX ADMIN — ENOXAPARIN SODIUM 40 MG: 100 INJECTION SUBCUTANEOUS at 08:52

## 2021-10-26 RX ADMIN — INSULIN LISPRO 2 UNITS: 100 INJECTION, SOLUTION INTRAVENOUS; SUBCUTANEOUS at 22:07

## 2021-10-26 RX ADMIN — ACETAMINOPHEN 650 MG: 325 TABLET ORAL at 22:01

## 2021-10-26 RX ADMIN — INSULIN LISPRO 7 UNITS: 100 INJECTION, SOLUTION INTRAVENOUS; SUBCUTANEOUS at 11:55

## 2021-10-26 RX ADMIN — MORPHINE SULFATE 2 MG: 2 INJECTION, SOLUTION INTRAMUSCULAR; INTRAVENOUS at 15:36

## 2021-10-26 RX ADMIN — ONDANSETRON 4 MG: 2 INJECTION INTRAMUSCULAR; INTRAVENOUS at 08:52

## 2021-10-26 RX ADMIN — ONDANSETRON 4 MG: 2 INJECTION INTRAMUSCULAR; INTRAVENOUS at 02:27

## 2021-10-26 RX ADMIN — PIPERACILLIN AND TAZOBACTAM 3.38 G: 3; .375 INJECTION, POWDER, LYOPHILIZED, FOR SOLUTION INTRAVENOUS at 22:17

## 2021-10-26 RX ADMIN — INSULIN GLARGINE 30 UNITS: 100 INJECTION, SOLUTION SUBCUTANEOUS at 11:56

## 2021-10-26 RX ADMIN — ENOXAPARIN SODIUM 40 MG: 100 INJECTION SUBCUTANEOUS at 22:02

## 2021-10-26 RX ADMIN — ACETAMINOPHEN 650 MG: 325 TABLET ORAL at 01:06

## 2021-10-26 RX ADMIN — Medication 10 ML: at 06:52

## 2021-10-26 RX ADMIN — PIPERACILLIN AND TAZOBACTAM 3.38 G: 3; .375 INJECTION, POWDER, LYOPHILIZED, FOR SOLUTION INTRAVENOUS at 14:07

## 2021-10-26 RX ADMIN — Medication 10 ML: at 22:02

## 2021-10-26 RX ADMIN — INSULIN LISPRO 3 UNITS: 100 INJECTION, SOLUTION INTRAVENOUS; SUBCUTANEOUS at 17:36

## 2021-10-26 RX ADMIN — DOCUSATE SODIUM 50MG AND SENNOSIDES 8.6MG 1 TABLET: 8.6; 5 TABLET, FILM COATED ORAL at 01:01

## 2021-10-26 RX ADMIN — AZITHROMYCIN MONOHYDRATE 500 MG: 500 INJECTION, POWDER, LYOPHILIZED, FOR SOLUTION INTRAVENOUS at 17:36

## 2021-10-26 NOTE — PROGRESS NOTES
10/25/21 1922   Vital Signs   Temp (!) 102.7 °F (39.3 °C)   Temp Source Oral   Pulse (Heart Rate) (!) 120   Cardiac Rhythm Sinus Tachy   Resp Rate 26   O2 Sat (%) 93 %   Level of Consciousness Alert (0)   /72   MAP (Monitor) 85   MAP (Calculated) 87   MEWS Score 6   Oxygen Therapy   Pulse via Oximetry 124 beats per minute   charge nurse made aware. Tylenol and ice packs given for fever.

## 2021-10-26 NOTE — PROGRESS NOTES
1900 Report received from [facility]. SBAR were discussed. , RN assumed care of the pt. Angelique Ball RN       End of Shift Note    Bedside shift change report given to  (oncoming nurse) by Angelique Ball RN (offgoing nurse). Report included the following information SBAR    Shift worked:  7p-7a     Shift summary and any significant changes:     Patient now c/o of abd pain and constipation.          Concerns for physician to address:       Zone phone for oncoming shift:                Problem: Patient Education: Go to Patient Education Activity  Goal: Patient/Family Education  Outcome: Progressing Towards Goal

## 2021-10-26 NOTE — PROGRESS NOTES
10/26/21 1951   Vital Signs   Temp (!) 100.8 °F (38.2 °C)   Temp Source Axillary   Pulse (Heart Rate) (!) 111   Heart Rate Source Monitor   Cardiac Rhythm Sinus Rhythm   Resp Rate 20   O2 Sat (%) 91 %   Level of Consciousness Alert (0)   /72   MAP (Calculated) 90   MEWS Score 3   Pain 1   Pain Scale 1 Numeric (0 - 10)   Pain Intensity 1 3   Patient Stated Pain Goal 3   Pain Onset 1 acute   Pain Location 1 Abdomen   Pain Orientation 1 Left;Right   Pain Description 1 Aching   Pain Intervention(s) 1 Repositioned   Oxygen Therapy   O2 Device None (Room air)   charge nurse made aware.   Will continue to monitor

## 2021-10-26 NOTE — PROGRESS NOTES
7:34 AM Received bedside verbal report from Yehuda Beyer RN    12:20 PM Spoke with patient about getting PCR again. Patient refused again despite explaining that the course of treatment might change if she is positive. She said \" I don't have any respiratory problem. Its my kidney that's making me sick\". End of Shift Note    Bedside shift change report given to [address] [place] [address]   (oncoming nurse) by Clifford Britt (offgoing nurse). Report included the following information SBAR, Kardex, Intake/Output, Recent Results and Cardiac Rhythm NSR    Shift worked:  7a-7p     Shift summary and any significant changes:    Refused her stool softener as she had BM this am.Still refusing PCR. Still having episodes of nausea. Zofran given twice and morphine given twice for pain. Concerns for physician to address:       Zone phone for oncoming shift:          Activity:  Activity Level: Up with Assistance  Number times ambulated in hallways past shift: 0  Number of times OOB to chair past shift: 1    Cardiac:   Cardiac Monitoring: Yes      Cardiac Rhythm: Sinus Rhythm, Sinus Tachy    Access:   Current line(s): PIV     Genitourinary:   Urinary status: voiding    Respiratory:   O2 Device: None (Room air)  Chronic home O2 use?: NO  Incentive spirometer at bedside: NO     GI:  Last Bowel Movement Date: 10/26/21  Current diet:  ADULT DIET Regular; 5 carb choices (75 gm/meal)  Passing flatus: YES  Tolerating current diet: YES       Pain Management:   Patient states pain is manageable on current regimen: YES    Skin:  Gordo Score: 18  Interventions: turn team, float heels, increase time out of bed, PT/OT consult and nutritional support     Patient Safety:  Fall Score:  Total Score: 1  Interventions: bed/chair alarm, assistive device (walker, cane, etc), gripper socks, pt to call before getting OOB and stay with me (per policy)       Length of Stay:  Expected LOS: 4d 19h  Actual LOS: 2      Michaela Tejada

## 2021-10-26 NOTE — PROGRESS NOTES
Received notification from bedside RN about patient with regards to: requesting medication for constipation  VS: /70, , RR 20, O2 sat 93%     Intervention given: Therese-colace PO BID, Dulcolax supp daily PRN ordered

## 2021-10-26 NOTE — PROGRESS NOTES
Hospitalist Progress Note    NAME: Carmina Davis   :  1975   MRN:  [id number]       Assessment / Plan:    Sepsis secondary to pyelonephritis and community-acquired pneumonia, POA  To rule out COVID-19 pneumonia, POA  Acute hypoxic respiratory failure, POA  Pneumonia with bacteremia, POA  Presented with shortness of breath, fever, leukocytosis requiring oxygen via nasal cannula meeting criteria for sepsis and acute hypoxic respiratory failure  CT abdomen with pyelonephritis and lower lobe pneumonia  Currently on room air, she has been on oxygen on and off  Continue azithromycin and Zosyn  Continue IV fluids since she is eating and drinking well  COVID-19 rapid test negative  Unvaccinated patient against COVID-19   Send respiratory film panel to rule out COVID-19 and influenza, rhinovirus, or RSV. Patient refused to get the test yesterday  Blood cultures gram-negative rods. Repeat blood cultures  UA was significant pyuria and bacteriuria  Urine culture positive for gram-negative rods, continue to monitor    Diabetes mellitus type 2  Correction scale and hypoglycemia protocol  Start insulin 25 units/kg which is 130 units  Most recent A1c around 11 last month    Chronic sciatica  Right lower extremity numbness and chronic sciatica as reported by patient  No other focal neurologic symptoms  Patient reported her symptoms gets better on stretching exercises  We will order PT OT  I offered gabapentin or Lyrica however patient declined    Hypovolemic hyponatremia  Elevated creatinine  Due to decreased oral intake  Continue IV fluids  Sodium levels are improving  Creatinine is back to baseline    Morbid obesity  BMI 53.07  Counseling was provided about weight loss        40 or above Morbid obesity / Body mass index is 53.92 kg/m².     Estimated discharge date:   Barriers: Clinical improvement    Code status: Full  Prophylaxis: Lovenox  Recommended Disposition: Home w/Family Subjective:     Patient was seen and examined. No acute events overnight. Discussed with RN overnight events. All patient's questions were answered. \"doing ok\"    Review of Systems:  Symptom Y/N Comments  Symptom Y/N Comments   Fever/Chills n   Chest Pain n    Poor Appetite    Edema     Cough n   Abdominal Pain n    Sputum    Joint Pain     SOB/SHAFFER y   Pruritis/Rash     Nausea/vomit n   Tolerating PT/OT     Diarrhea    Tolerating Diet y    Constipation    Other       Could NOT obtain due to:          Objective:     VITALS:   Last 24hrs VS reviewed since prior progress note. Most recent are:  Patient Vitals for the past 24 hrs:   Temp Pulse Resp BP SpO2   10/26/21 1107 98.6 °F (37 °C) 99 20 125/74 92 %   10/26/21 0747 99.1 °F (37.3 °C) (!) 101 20 105/68 92 %   10/26/21 0226 98.6 °F (37 °C) (!) 107 20 109/73 97 %   10/26/21 0107 100.3 °F (37.9 °C)       10/25/21 2305 (!) 100.7 °F (38.2 °C) (!) 111 20 115/70 93 %   10/25/21 2118 99.1 °F (37.3 °C) (!) 111      10/25/21 1922 (!) 102.7 °F (39.3 °C) (!) 120 26 118/72 93 %   10/25/21 1535 99.1 °F (37.3 °C) (!) 105 22 111/67 97 %   10/25/21 1222    124/69 96 %   10/25/21 1220 (!) 101.8 °F (38.8 °C) (!) 114 24 124/69 100 %       Intake/Output Summary (Last 24 hours) at 10/26/2021 1159  Last data filed at 10/25/2021 2118  Gross per 24 hour   Intake 120 ml   Output    Net 120 ml        I had a face to face encounter and independently examined this patient on 10/26/2021, as outlined below:  PHYSICAL EXAM:  General: WD, WN. Alert, cooperative, no acute distress    EENT:  EOMI. Anicteric sclerae. MMM  Resp:  CTA bilaterally, no wheezing or rales. No accessory muscle use  CV:  Regular  rhythm,  No edema  GI:  Soft, Non distended, Non tender. +Bowel sounds  Neurologic:  Alert and oriented X 3, normal speech,   Psych:   Good insight. Not anxious nor agitated  Skin:  No rashes.   No jaundice    Reviewed most current lab test results and cultures YES  Reviewed most current radiology test results   YES  Review and summation of old records today    NO  Reviewed patient's current orders and MAR    YES  PMH/SH reviewed - no change compared to H&P  ________________________________________________________________________  Care Plan discussed with:    Comments   Patient x    Family      RN x    Care Manager     Consultant                        Multidiciplinary team rounds were held today with , nursing, pharmacist and clinical coordinator. Patient's plan of care was discussed; medications were reviewed and discharge planning was addressed. ________________________________________________________________________  Total NON critical care TIME:  37   Minutes    Total CRITICAL CARE TIME Spent:   Minutes non procedure based      Comments   >50% of visit spent in counseling and coordination of care     ________________________________________________________________________  Nannette Palm MD     Procedures: see electronic medical records for all procedures/Xrays and details which were not copied into this note but were reviewed prior to creation of Plan. LABS:  I reviewed today's most current labs and imaging studies.   Pertinent labs include:  Recent Labs     10/25/21  0519 10/24/21  1446   WBC 17.2* 18.1*   HGB 12.5 14.8   HCT 38.1 45.9    183     Recent Labs     10/25/21  0519 10/24/21  2307 10/24/21  1446   * 129* 128*   K 4.0  --  4.0     --  97   CO2 25  --  27   *  --  288*   BUN 9  --  10   CREA 1.04*  --  1.14*   CA 8.2*  --  9.1   ALB  --   --  3.5   TBILI  --   --  1.4*   ALT  --   --  58       Signed: Nannette aPlm MD

## 2021-10-27 LAB
ANION GAP SERPL CALC-SCNC: 6 MMOL/L (ref 5–15)
BACTERIA SPEC CULT: ABNORMAL
BUN SERPL-MCNC: 12 MG/DL (ref 6–20)
BUN/CREAT SERPL: 13 (ref 12–20)
CALCIUM SERPL-MCNC: 8.4 MG/DL (ref 8.5–10.1)
CC UR VC: ABNORMAL
CHLORIDE SERPL-SCNC: 102 MMOL/L (ref 97–108)
CO2 SERPL-SCNC: 26 MMOL/L (ref 21–32)
CREAT SERPL-MCNC: 0.9 MG/DL (ref 0.55–1.02)
ERYTHROCYTE [DISTWIDTH] IN BLOOD BY AUTOMATED COUNT: 13.7 % (ref 11.5–14.5)
GLUCOSE BLD STRIP.AUTO-MCNC: 204 MG/DL (ref 65–117)
GLUCOSE BLD STRIP.AUTO-MCNC: 226 MG/DL (ref 65–117)
GLUCOSE BLD STRIP.AUTO-MCNC: 258 MG/DL (ref 65–117)
GLUCOSE BLD STRIP.AUTO-MCNC: 279 MG/DL (ref 65–117)
GLUCOSE SERPL-MCNC: 213 MG/DL (ref 65–100)
HCT VFR BLD AUTO: 35.1 % (ref 35–47)
HGB BLD-MCNC: 11.2 G/DL (ref 11.5–16)
MCH RBC QN AUTO: 27.5 PG (ref 26–34)
MCHC RBC AUTO-ENTMCNC: 31.9 G/DL (ref 30–36.5)
MCV RBC AUTO: 86.2 FL (ref 80–99)
NRBC # BLD: 0 K/UL (ref 0–0.01)
NRBC BLD-RTO: 0 PER 100 WBC
PLATELET # BLD AUTO: 128 K/UL (ref 150–400)
PMV BLD AUTO: 12 FL (ref 8.9–12.9)
POTASSIUM SERPL-SCNC: 3.5 MMOL/L (ref 3.5–5.1)
RBC # BLD AUTO: 4.07 M/UL (ref 3.8–5.2)
SERVICE CMNT-IMP: ABNORMAL
SODIUM SERPL-SCNC: 134 MMOL/L (ref 136–145)
WBC # BLD AUTO: 9.1 K/UL (ref 3.6–11)

## 2021-10-27 PROCEDURE — 74011250637 HC RX REV CODE- 250/637: Performed by: INTERNAL MEDICINE

## 2021-10-27 PROCEDURE — 74011250636 HC RX REV CODE- 250/636: Performed by: INTERNAL MEDICINE

## 2021-10-27 PROCEDURE — 74011636637 HC RX REV CODE- 636/637: Performed by: INTERNAL MEDICINE

## 2021-10-27 PROCEDURE — 65660000000 HC RM CCU STEPDOWN

## 2021-10-27 PROCEDURE — 80048 BASIC METABOLIC PNL TOTAL CA: CPT

## 2021-10-27 PROCEDURE — 74011000258 HC RX REV CODE- 258: Performed by: INTERNAL MEDICINE

## 2021-10-27 PROCEDURE — 82962 GLUCOSE BLOOD TEST: CPT

## 2021-10-27 PROCEDURE — 74011250636 HC RX REV CODE- 250/636: Performed by: GENERAL ACUTE CARE HOSPITAL

## 2021-10-27 PROCEDURE — 85027 COMPLETE CBC AUTOMATED: CPT

## 2021-10-27 PROCEDURE — 74011250637 HC RX REV CODE- 250/637: Performed by: NURSE PRACTITIONER

## 2021-10-27 PROCEDURE — 74011250636 HC RX REV CODE- 250/636: Performed by: EMERGENCY MEDICINE

## 2021-10-27 PROCEDURE — 36415 COLL VENOUS BLD VENIPUNCTURE: CPT

## 2021-10-27 RX ORDER — MORPHINE SULFATE 2 MG/ML
2 INJECTION, SOLUTION INTRAMUSCULAR; INTRAVENOUS ONCE
Status: COMPLETED | OUTPATIENT
Start: 2021-10-27 | End: 2021-10-27

## 2021-10-27 RX ORDER — NYSTATIN 100000 [USP'U]/G
POWDER TOPICAL 2 TIMES DAILY
Status: DISCONTINUED | OUTPATIENT
Start: 2021-10-27 | End: 2021-10-28 | Stop reason: HOSPADM

## 2021-10-27 RX ORDER — INSULIN GLARGINE 100 [IU]/ML
36 INJECTION, SOLUTION SUBCUTANEOUS DAILY
Status: DISCONTINUED | OUTPATIENT
Start: 2021-10-27 | End: 2021-10-28 | Stop reason: HOSPADM

## 2021-10-27 RX ADMIN — INSULIN LISPRO 3 UNITS: 100 INJECTION, SOLUTION INTRAVENOUS; SUBCUTANEOUS at 08:13

## 2021-10-27 RX ADMIN — PIPERACILLIN AND TAZOBACTAM 3.38 G: 3; .375 INJECTION, POWDER, LYOPHILIZED, FOR SOLUTION INTRAVENOUS at 08:14

## 2021-10-27 RX ADMIN — ENOXAPARIN SODIUM 40 MG: 100 INJECTION SUBCUTANEOUS at 22:05

## 2021-10-27 RX ADMIN — NYSTATIN: 100000 POWDER TOPICAL at 19:07

## 2021-10-27 RX ADMIN — INSULIN GLARGINE 36 UNITS: 100 INJECTION, SOLUTION SUBCUTANEOUS at 10:02

## 2021-10-27 RX ADMIN — AZITHROMYCIN MONOHYDRATE 500 MG: 500 INJECTION, POWDER, LYOPHILIZED, FOR SOLUTION INTRAVENOUS at 19:02

## 2021-10-27 RX ADMIN — Medication 10 ML: at 15:53

## 2021-10-27 RX ADMIN — INSULIN LISPRO 3 UNITS: 100 INJECTION, SOLUTION INTRAVENOUS; SUBCUTANEOUS at 19:01

## 2021-10-27 RX ADMIN — Medication 10 ML: at 05:26

## 2021-10-27 RX ADMIN — NYSTATIN: 100000 POWDER TOPICAL at 12:52

## 2021-10-27 RX ADMIN — MORPHINE SULFATE 2 MG: 2 INJECTION, SOLUTION INTRAMUSCULAR; INTRAVENOUS at 22:04

## 2021-10-27 RX ADMIN — DOCUSATE SODIUM 50MG AND SENNOSIDES 8.6MG 1 TABLET: 8.6; 5 TABLET, FILM COATED ORAL at 19:03

## 2021-10-27 RX ADMIN — INSULIN LISPRO 5 UNITS: 100 INJECTION, SOLUTION INTRAVENOUS; SUBCUTANEOUS at 12:53

## 2021-10-27 RX ADMIN — INSULIN LISPRO 3 UNITS: 100 INJECTION, SOLUTION INTRAVENOUS; SUBCUTANEOUS at 22:04

## 2021-10-27 RX ADMIN — PIPERACILLIN AND TAZOBACTAM 3.38 G: 3; .375 INJECTION, POWDER, LYOPHILIZED, FOR SOLUTION INTRAVENOUS at 15:52

## 2021-10-27 RX ADMIN — PIPERACILLIN AND TAZOBACTAM 3.38 G: 3; .375 INJECTION, POWDER, LYOPHILIZED, FOR SOLUTION INTRAVENOUS at 22:18

## 2021-10-27 RX ADMIN — MORPHINE SULFATE 2 MG: 2 INJECTION, SOLUTION INTRAMUSCULAR; INTRAVENOUS at 10:06

## 2021-10-27 RX ADMIN — MORPHINE SULFATE 2 MG: 2 INJECTION, SOLUTION INTRAMUSCULAR; INTRAVENOUS at 13:00

## 2021-10-27 RX ADMIN — ONDANSETRON 4 MG: 2 INJECTION INTRAMUSCULAR; INTRAVENOUS at 10:06

## 2021-10-27 RX ADMIN — Medication 10 ML: at 22:14

## 2021-10-27 NOTE — PROGRESS NOTES
Transition of Care Plan:     RUR:9 %, low ris  Disposition: Home   Follow up appointments:PCP  DME needed: None   Transportation at Discharge: Patient drove herself   [place] or means to access home:  Yes      IM Medicare Letter: N/A commercial insurance   Is patient a BCPI-A Bundle:    N/A                  If yes, was Bundle Letter given?:     Caregiver Contact: Patient requested to have her mother removed as emergency contact and did not want anyone added as caregiver or emergency contact   Discharge Caregiver contacted prior to discharge? N/A    Initial note:  Chart reviewed. Pt not medically stable for d/c. CM will continue to monitor for d/c needs.     Blaine Pritchett, MSN  Care Manager  [phone]

## 2021-10-27 NOTE — PROGRESS NOTES
Hospitalist Progress Note    NAME: Chavo Williamson   :  1975   MRN:  [id number]       Assessment / Plan:    Sepsis secondary to pyelonephritis and community-acquired pneumonia, POA  To rule out COVID-19 pneumonia, POA  Acute hypoxic respiratory failure, POA  Pneumonia with bacteremia, POA  Presented with shortness of breath, fever, leukocytosis requiring oxygen via nasal cannula meeting criteria for sepsis and acute hypoxic respiratory failure  CT abdomen with pyelonephritis and lower lobe pneumonia  Currently on room air, she has been on oxygen on and off  Continue azithromycin and Zosyn  Unvaccinated patient against COVID-19   Send respiratory film panel to rule out COVID-19 and influenza, rhinovirus, or RSV. Patient refused? Blood cultures gram-negative rods, f/u S&S  Repeat blood cultures until negative  UA was significant pyuria and bacteriuria  Urine culture positive for gram-negative rods, f/u S&S  - Leukocytosis improving    Diabetes mellitus type 2  Correction scale and hypoglycemia protocol  Increase lantus to 36 units  -ADA diet  Most recent A1c around 11 last month    Chronic sciatica  Right lower extremity numbness and chronic sciatica as reported by patient  No other focal neurologic symptoms  Patient reported her symptoms gets better on stretching exercises  We will order PT OT      Hypovolemic hyponatremia  Elevated creatinine  Due to decreased oral intake  - DCIV fluids  Sodium levels are improving  Creatinine is back to baseline    Morbid obesity  BMI 53.07  Counseling was provided about weight loss    Fungal rash  Nystatin powder    40 or above Morbid obesity / Body mass index is 54.27 kg/m². Estimated discharge date:   Barriers: Clinical improvement, Repeat blood cultures negative 48 hrs. S&S on current Ucx and Bcx    Code status: Full  Prophylaxis: Lovenox  Recommended Disposition: Home w/Family     Subjective:     Patient was seen and examined.   No acute events overnight. Patient states that she feels no better since coming into the hospital.  She actually feels little worse. Her main complaint is bilateral lower abdominal pain that is worse with pushing and movement. Tolerating oral medications and food and drink    Discussed with RN overnight events. All patient's questions were answered. Review of Systems:  Symptom Y/N Comments  Symptom Y/N Comments   Fever/Chills n   Chest Pain n    Poor Appetite    Edema     Cough n   Abdominal Pain n    Sputum    Joint Pain     SOB/SHAFFER y   Pruritis/Rash     Nausea/vomit n   Tolerating PT/OT     Diarrhea    Tolerating Diet y    Constipation    Other       Could NOT obtain due to:          Objective:     VITALS:   Last 24hrs VS reviewed since prior progress note. Most recent are:  Patient Vitals for the past 24 hrs:   Temp Pulse Resp BP SpO2   10/27/21 0353 98.1 °F (36.7 °C) 85 20 113/62 91 %   10/26/21 2227 99.6 °F (37.6 °C) (!) 109 20 122/77 97 %   10/26/21 1951 (!) 100.8 °F (38.2 °C) (!) 111 20 127/72 91 %   10/26/21 1734 99.2 °F (37.3 °C)       10/26/21 1538 100.4 °F (38 °C) (!) 109 20 127/65 94 %   10/26/21 1107 98.6 °F (37 °C) 99 20 125/74 92 %   10/26/21 0747 99.1 °F (37.3 °C) (!) 101 20 105/68 92 %       Intake/Output Summary (Last 24 hours) at 10/27/2021 9811  Last data filed at 10/26/2021 1956  Gross per 24 hour   Intake 120 ml   Output    Net 120 ml        I had a face to face encounter and independently examined this patient on 10/27/2021, as outlined below:  PHYSICAL EXAM:  General: WD, WN. Alert, cooperative, no acute distress    EENT:  EOMI. Anicteric sclerae. MMM  Resp:  CTA bilaterally, no wheezing or rales. No accessory muscle use  CV:  Regular  rhythm,  No edema  GI:  Soft, Non distended, Mild tenderness to palpation under skin folds. +Bowel sounds  Neurologic:  Alert and oriented X 3, normal speech,   Psych:   Good insight. Not anxious nor agitated  Skin:  No rashes.   No jaundice, fungal rash under bilateral lower abdominal skin folds    Reviewed most current lab test results and cultures  YES  Reviewed most current radiology test results   YES  Review and summation of old records today    NO  Reviewed patient's current orders and MAR    YES  PMH/SH reviewed - no change compared to H&P  ________________________________________________________________________  Care Plan discussed with:    Comments   Patient x    Family      RN x    Care Manager     Consultant                        Multidiciplinary team rounds were held today with , nursing, pharmacist and clinical coordinator. Patient's plan of care was discussed; medications were reviewed and discharge planning was addressed. ________________________________________________________________________  Total NON critical care TIME:  31   Minutes    Total CRITICAL CARE TIME Spent:   Minutes non procedure based      Comments   >50% of visit spent in counseling and coordination of care     ________________________________________________________________________  Antonio Judge MD     Procedures: see electronic medical records for all procedures/Xrays and details which were not copied into this note but were reviewed prior to creation of Plan. LABS:  I reviewed today's most current labs and imaging studies. Pertinent labs include:  Recent Labs     10/27/21  0337 10/25/21  0519 10/24/21  1446   WBC 9.1 17.2* 18.1*   HGB 11.2* 12.5 14.8   HCT 35.1 38.1 45.9   * 152 183     Recent Labs     10/27/21  0337 10/25/21  0519 10/24/21  2307 10/24/21  1446 10/24/21  1446   * 130* 129*   < > 128*   K 3.5 4.0  --   --  4.0    100  --   --  97   CO2 26 25  --   --  27   * 270*  --   --  288*   BUN 12 9  --   --  10   CREA 0.90 1.04*  --   --  1.14*   CA 8.4* 8.2*  --   --  9.1   ALB  --   --   --   --  3.5   TBILI  --   --   --   --  1.4*   ALT  --   --   --   --  58    < > = values in this interval not displayed. Signed: Mike Pierson MD

## 2021-10-27 NOTE — PROGRESS NOTES
1900 Report received from [facility] ([facility]). SBAR were discussed. , RN assumed care of the pt. Traci Vega RN       End of Shift Note    Bedside shift change report given to Kandis Coles (oncoming nurse) by Traci Vega RN (offgoing nurse).   Report included the following information SBAR    Shift worked:  7p-7a     Shift summary and any significant changes:          Concerns for physician to address:       Zone phone for oncoming shift:                Problem: Patient Education: Go to Patient Education Activity  Goal: Patient/Family Education  Outcome: Progressing Towards Goal

## 2021-10-28 VITALS
HEART RATE: 91 BPM | HEIGHT: 69 IN | BODY MASS INDEX: 43.4 KG/M2 | RESPIRATION RATE: 20 BRPM | OXYGEN SATURATION: 91 % | SYSTOLIC BLOOD PRESSURE: 120 MMHG | DIASTOLIC BLOOD PRESSURE: 75 MMHG | TEMPERATURE: 98 F | WEIGHT: 293 LBS

## 2021-10-28 LAB
ANION GAP SERPL CALC-SCNC: 7 MMOL/L (ref 5–15)
BUN SERPL-MCNC: 9 MG/DL (ref 6–20)
BUN/CREAT SERPL: 11 (ref 12–20)
CALCIUM SERPL-MCNC: 8.6 MG/DL (ref 8.5–10.1)
CHLORIDE SERPL-SCNC: 101 MMOL/L (ref 97–108)
CO2 SERPL-SCNC: 22 MMOL/L (ref 21–32)
CREAT SERPL-MCNC: 0.8 MG/DL (ref 0.55–1.02)
GLUCOSE BLD STRIP.AUTO-MCNC: 207 MG/DL (ref 65–117)
GLUCOSE BLD STRIP.AUTO-MCNC: 215 MG/DL (ref 65–117)
GLUCOSE SERPL-MCNC: 188 MG/DL (ref 65–100)
MAGNESIUM SERPL-MCNC: 1.9 MG/DL (ref 1.6–2.4)
PHOSPHATE SERPL-MCNC: 3.7 MG/DL (ref 2.6–4.7)
POTASSIUM SERPL-SCNC: 3.6 MMOL/L (ref 3.5–5.1)
SERVICE CMNT-IMP: ABNORMAL
SERVICE CMNT-IMP: ABNORMAL
SODIUM SERPL-SCNC: 130 MMOL/L (ref 136–145)

## 2021-10-28 PROCEDURE — 74011636637 HC RX REV CODE- 636/637: Performed by: INTERNAL MEDICINE

## 2021-10-28 PROCEDURE — 80048 BASIC METABOLIC PNL TOTAL CA: CPT

## 2021-10-28 PROCEDURE — 74011250636 HC RX REV CODE- 250/636: Performed by: INTERNAL MEDICINE

## 2021-10-28 PROCEDURE — 74011000258 HC RX REV CODE- 258: Performed by: INTERNAL MEDICINE

## 2021-10-28 PROCEDURE — 74011250636 HC RX REV CODE- 250/636: Performed by: GENERAL ACUTE CARE HOSPITAL

## 2021-10-28 PROCEDURE — 82962 GLUCOSE BLOOD TEST: CPT

## 2021-10-28 PROCEDURE — 84100 ASSAY OF PHOSPHORUS: CPT

## 2021-10-28 PROCEDURE — 83735 ASSAY OF MAGNESIUM: CPT

## 2021-10-28 PROCEDURE — 74011250637 HC RX REV CODE- 250/637: Performed by: NURSE PRACTITIONER

## 2021-10-28 PROCEDURE — 36415 COLL VENOUS BLD VENIPUNCTURE: CPT

## 2021-10-28 RX ORDER — CEPHALEXIN 500 MG/1
500 CAPSULE ORAL 4 TIMES DAILY
Qty: 40 CAPSULE | Refills: 0 | Status: SHIPPED | OUTPATIENT
Start: 2021-10-28 | End: 2021-11-07

## 2021-10-28 RX ORDER — BLOOD SUGAR DIAGNOSTIC
STRIP MISCELLANEOUS
Qty: 100 STRIP | Refills: 0 | Status: SHIPPED | OUTPATIENT
Start: 2021-10-28

## 2021-10-28 RX ORDER — OXYCODONE AND ACETAMINOPHEN 5; 325 MG/1; MG/1
1 TABLET ORAL
Qty: 12 TABLET | Refills: 0 | Status: SHIPPED | OUTPATIENT
Start: 2021-10-28 | End: 2021-10-31

## 2021-10-28 RX ORDER — INSULIN GLARGINE 100 [IU]/ML
40 INJECTION, SOLUTION SUBCUTANEOUS
Qty: 1 PEN | Refills: 0 | Status: SHIPPED | OUTPATIENT
Start: 2021-10-28 | End: 2021-11-29 | Stop reason: SDUPTHER

## 2021-10-28 RX ORDER — INSULIN PUMP SYRINGE, 3 ML
EACH MISCELLANEOUS
Qty: 1 KIT | Refills: 0 | Status: SHIPPED | OUTPATIENT
Start: 2021-10-28

## 2021-10-28 RX ADMIN — INSULIN LISPRO 3 UNITS: 100 INJECTION, SOLUTION INTRAVENOUS; SUBCUTANEOUS at 13:03

## 2021-10-28 RX ADMIN — INSULIN GLARGINE 36 UNITS: 100 INJECTION, SOLUTION SUBCUTANEOUS at 10:08

## 2021-10-28 RX ADMIN — MORPHINE SULFATE 2 MG: 2 INJECTION, SOLUTION INTRAMUSCULAR; INTRAVENOUS at 10:08

## 2021-10-28 RX ADMIN — INSULIN LISPRO 3 UNITS: 100 INJECTION, SOLUTION INTRAVENOUS; SUBCUTANEOUS at 10:08

## 2021-10-28 RX ADMIN — Medication 10 ML: at 05:19

## 2021-10-28 RX ADMIN — DOCUSATE SODIUM 50MG AND SENNOSIDES 8.6MG 1 TABLET: 8.6; 5 TABLET, FILM COATED ORAL at 10:08

## 2021-10-28 RX ADMIN — PIPERACILLIN AND TAZOBACTAM 3.38 G: 3; .375 INJECTION, POWDER, LYOPHILIZED, FOR SOLUTION INTRAVENOUS at 10:18

## 2021-10-28 RX ADMIN — NYSTATIN: 100000 POWDER TOPICAL at 10:22

## 2021-10-28 NOTE — PROGRESS NOTES
[address]            10/28/2021      RE: Gustavo Greene (YOB: 1975)    To Whom it May Concern:    Please excuse Gustavo Greene from work from 10/24/2021 to 10/28/2021 as she/he was admitted to [facility] for a medical condition and has been under my care. She/he is advised to rest for a few more days and may return to work on 11/4/21 with no new restrictions. She is advised to rest for another week to recover and he / she should follow up with his / her PCP to evaluate for any return to work restrictions. Please feel free to contact my office if you have any questions or concerns. Thank you for your assistance in this matter.         Kenny Huff MD  [phone] office

## 2021-10-28 NOTE — PROGRESS NOTES
1900:  Verbal shift change report given to Del Walsh, RN (oncoming nurse) by Chuyita Corona RN (offgoing nurse). Report included the following information SBAR, Kardex, ED Summary, Intake/Output, Recent Results and Cardiac Rhythm NSR; Sinus Tach. 0330: Pt agreed to lab draw, but only would allow 1 attempt. Was only able to obtain BMP, as vein blew and pt refused a 2nd stick. CBC not obtained. 0445: Pts sodium resulted at 130. NP notified.

## 2021-10-28 NOTE — PROGRESS NOTES
0700:  End of Shift Note    Bedside shift change report given to Logan Lyon RN (oncoming nurse) by Meg Richards RN (offgoing nurse). Report included the following information SBAR, Kardex, ED Summary, Recent Results and Cardiac Rhythm NSR, Sinus Tach    Shift worked:  9246-9026     Shift summary and any significant changes:    -Pt is a hard stick. Pt only agreed to am lab draws with 1 attempt. Was only able to obtain BMP as vein blew. CBC still needed. Will communicate to day RN to contact PICC team.    -Pts sodium resulted at 130. NP notified. Concerns for physician to address:    Zone phone for oncoming shift:       Activity:  Activity Level: Up with Assistance  Number times ambulated in hallways past shift: 0  Number of times OOB to chair past shift: 2    Cardiac:   Cardiac Monitoring: Yes      Cardiac Rhythm: Sinus Rhythm    Access:   Current line(s): PIV     Genitourinary:   Urinary status: voiding    Respiratory:   O2 Device: None (Room air)  Chronic home O2 use?: NO  Incentive spirometer at bedside: NO     GI:  Last Bowel Movement Date: 10/26/21  Current diet:  ADULT DIET Regular; 4 carb choices (60 gm/meal)  Passing flatus: YES  Tolerating current diet: YES       Pain Management:   Patient states pain is manageable on current regimen: YES    Skin:  Gordo Score: 17  Interventions: turn team, speciality bed, increase time out of bed, PT/OT consult and nutritional support     Patient Safety:  Fall Score:  Total Score: 1  Interventions: bed/chair alarm, assistive device (walker, cane, etc) and pt to call before getting OOB       Length of Stay:  Expected LOS: 4d 19h  Actual LOS: Leonid Wilkinson, RN

## 2021-10-28 NOTE — DISCHARGE SUMMARY
Hospitalist Discharge Summary     Patient ID:  Robb Dan  [id number]  42 y.o.  1975    PCP on record: Adrienne Herring MD    Admit date: 10/24/2021  Discharge date and time: 10/28/2021      DISCHARGE DIAGNOSIS:    COVID  Pyelonephritis  Bacteremia  DM      CONSULTATIONS:  IP CONSULT TO HOSPITALIST    Excerpted HPI from H&P of Mila Willams MD:    42years old female from home with past medical history significant for obesity, DM, history of cholecystectomy presented to the hospital for evaluation of left flank pain associated with fever nausea vomiting started since yesterday, patient denies any shortness of breath denies any cough, work in ED was significant for elevated white blood cell count 18.1, CT abdomen show probable pyelonephritis of the left lower kidney renal pole on the left lower lobe pneumonia.  ______________________________________________________________________  DISCHARGE SUMMARY/HOSPITAL COURSE:  for full details see H&P, daily progress notes, labs, consult notes. Patient was treated for pyelonephritis as well as bacteremia. Her blood and urine cultures grew Klebsiella pneumoniae which was resistant only to [address]. She was initially placed on Zosyn and she was transitioned to Keflex. Her leukocytosis did resolve. Her repeat blood cultures did not show any growth of Klebsiella pneumoniae. Patient did feel improved. She also started on insulin due to elevated glucose levels with a recent hemoglobin A1c of greater than 11. Patient was provided teaching and education. Patient was stable to be discharged. Of note, patient tested +ve for COVID-19 but did not require supplemental O2      _______________________________________________________________________  Patient seen and examined by me on discharge day. Pertinent Findings:  Gen:    Not in distress  Chest: Clear lungs  CVS:   Regular rhythm.   No edema  Abd:  Soft, not distended, not tender, obese, fungal rash under folds  Neuro:  Alert, Oriented x 4, grossly non focal exam  _______________________________________________________________________  DISCHARGE MEDICATIONS:   Current Discharge Medication List      START taking these medications    Details   insulin glargine (Lantus Solostar U-100 Insulin) 100 unit/mL (3 mL) inpn 40 Units by SubCUTAneous route nightly. Qty: 1 Pen, Refills: 0  Start date: 10/28/2021      Blood-Glucose Meter monitoring kit Please take readings 4 times a day, before breakfast, lunch, dinner and at bedtime  Qty: 1 Kit, Refills: 0  Start date: 10/28/2021      glucose blood VI test strips (Glucocom Glucose) strip TID AC, QHs  Qty: 100 Strip, Refills: 0  Start date: 10/28/2021      lancets-blood glucose strips 30 gauge cmpk 1 Package by Does Not Apply route Before breakfast, lunch, dinner and at bedtime. Qty: 1 Dose Pack, Refills: 0  Start date: 10/28/2021      cephALEXin (Keflex) 500 mg capsule Take 1 Capsule by mouth four (4) times daily for 10 days. Qty: 40 Capsule, Refills: 0  Start date: 10/28/2021, End date: 11/7/2021      oxyCODONE-acetaminophen (Percocet) 5-325 mg per tablet Take 1 Tablet by mouth every six (6) hours as needed for Pain for up to 3 days. Max Daily Amount: 4 Tablets. Qty: 12 Tablet, Refills: 0  Start date: 10/28/2021, End date: 10/31/2021    Associated Diagnoses: Pyelonephritis         CONTINUE these medications which have NOT CHANGED    Details   liraglutide (VICTOZA) 0.6 mg/0.1 mL (18 mg/3 mL) pnij 1.8 mg by SubCUTAneous route daily. Qty: 5 Adjustable Dose Pre-filled Pen Syringe, Refills: 0    Associated Diagnoses: Type 2 diabetes mellitus without complication, without long-term current use of insulin (HCC)      glipiZIDE SR (GLUCOTROL XL) 10 mg CR tablet Take 1 Tablet by mouth daily.  TAKE BEFORE BREAKFAST  Qty: 3090 Tablet, Refills: 1    Associated Diagnoses: Type 2 diabetes mellitus without complication, without long-term current use of insulin (HCC) medroxyPROGESTERone (Provera) 10 mg tablet Take 10 mg by mouth daily. For 10 days out of month      BD Ultra-Fine Short Pen Needle 31 gauge x 5/16\" ndle              My Recommended Diet, Activity, Wound Care, and follow-up labs are listed in the patient's Discharge Insturctions which I have personally completed and reviewed.   Risk of deterioration: Low    Condition at Discharge:  Stable  _____________________________________________________________________    Disposition  Home with family, no needs  ____________________________________________________________________    Care Plan discussed with:   Family, RN, Care Manager, Consultant    ____________________________________________________________________    Code Status: Full Code  ____________________________________________________________________      Condition at Discharge:  Stable  _____________________________________________________________________  Follow up with:   PCP : Jenna Vee MD  Follow-up Information     Follow up With Specialties Details Why [address], [address], [place] Family [facility]   [address]  [id number] [facility]  [phone]                Total time in minutes spent coordinating this discharge (includes going over instructions, follow-up, prescriptions, and preparing report for sign off to her PCP) :  35 minutes    Signed:  Madeline Mccall MD

## 2021-10-28 NOTE — PROGRESS NOTES
End of Shift Note    Bedside shift change report given to Billy Bruno RN (oncoming nurse) by Lana Chao RN (offgoing nurse). Report included the following information SBAR    Shift worked:  Days     Shift summary and any significant changes:     Patient complained of left flank pain and received morphine to help. Rested comfortably this afternoon. Nystatin ordered for bilateral groin. Concerns for physician to address:       Zone phone for oncoming shift:          Activity:  Activity Level: Up with Assistance  Number times ambulated in hallways past shift: 0  Number of times OOB to chair past shift: 0    Cardiac:   Cardiac Monitoring: Yes      Cardiac Rhythm: Sinus Rhythm    Access:   Current line(s): PIV     Genitourinary:   Urinary status: voiding    Respiratory:   O2 Device: None (Room air)  Chronic home O2 use?: NO  Incentive spirometer at bedside: NO     GI:  Last Bowel Movement Date: 10/26/21  Current diet:  ADULT DIET Regular; 4 carb choices (60 gm/meal)  Passing flatus: YES  Tolerating current diet: YES       Pain Management:   Patient states pain is manageable on current regimen: YES    Skin:  Gordo Score: 16  Interventions: limit briefs    Patient Safety:  Fall Score:  Total Score: 1  Interventions: gripper socks       Length of Stay:  Expected LOS: 4d 19h  Actual LOS: [address] [place], [place]

## 2021-10-28 NOTE — PROGRESS NOTES
Problem: Patient Education: Go to Patient Education Activity  Goal: Patient/Family Education  Outcome: Progressing Towards Goal     Problem: Sepsis: Day of Diagnosis  Goal: Off Pathway (Use only if patient is Off Pathway)  Outcome: Progressing Towards Goal  Goal: *Fluid resuscitation  Outcome: Progressing Towards Goal  Goal: *Paired blood cultures prior to first dose of antibiotic  Outcome: Progressing Towards Goal  Goal: *First dose of  appropriate antibiotic within 3 hours of arrival to ED, within 1 hour of arrival to ICU  Outcome: Progressing Towards Goal  Goal: *Lactic acid with first set of blood cultures  Outcome: Progressing Towards Goal  Goal: *Pneumococcal immunization (if eligible)  Outcome: Progressing Towards Goal  Goal: *Influenza immunization (if eligible)  Outcome: Progressing Towards Goal  Goal: Activity/Safety  Outcome: Progressing Towards Goal  Goal: Consults, if ordered  Outcome: Progressing Towards Goal  Goal: Diagnostic Test/Procedures  Outcome: Progressing Towards Goal  Goal: Nutrition/Diet  Outcome: Progressing Towards Goal  Goal: Discharge Planning  Outcome: Progressing Towards Goal  Goal: Medications  Outcome: Progressing Towards Goal  Goal: Respiratory  Outcome: Progressing Towards Goal  Goal: Treatments/Interventions/Procedures  Outcome: Progressing Towards Goal  Goal: Psychosocial  Outcome: Progressing Towards Goal     Problem: Sepsis: Day 2  Goal: Off Pathway (Use only if patient is Off Pathway)  Outcome: Progressing Towards Goal  Goal: *Central Venous Pressure maintained at 8-12 mm Hg  Outcome: Progressing Towards Goal  Goal: *Hemodynamically stable  Outcome: Progressing Towards Goal  Goal: *Tolerating diet  Outcome: Progressing Towards Goal  Goal: Activity/Safety  Outcome: Progressing Towards Goal  Goal: Consults, if ordered  Outcome: Progressing Towards Goal  Goal: Diagnostic Test/Procedures  Outcome: Progressing Towards Goal  Goal: Nutrition/Diet  Outcome: Progressing Towards Goal  Goal: Discharge Planning  Outcome: Progressing Towards Goal  Goal: Medications  Outcome: Progressing Towards Goal  Goal: Respiratory  Outcome: Progressing Towards Goal  Goal: Treatments/Interventions/Procedures  Outcome: Progressing Towards Goal  Goal: Psychosocial  Outcome: Progressing Towards Goal     Problem: Sepsis: Day 3  Goal: Off Pathway (Use only if patient is Off Pathway)  Outcome: Progressing Towards Goal  Goal: *Central Venous Pressure maintained at 8-12 mm Hg  Outcome: Progressing Towards Goal  Goal: *Oxygen saturation within defined limits  Outcome: Progressing Towards Goal  Goal: *Vital sign stability  Outcome: Progressing Towards Goal  Goal: *Tolerating diet  Outcome: Progressing Towards Goal  Goal: *Demonstrates progressive activity  Outcome: Progressing Towards Goal  Goal: Activity/Safety  Outcome: Progressing Towards Goal  Goal: Consults, if ordered  Outcome: Progressing Towards Goal  Goal: Diagnostic Test/Procedures  Outcome: Progressing Towards Goal  Goal: Nutrition/Diet  Outcome: Progressing Towards Goal  Goal: Discharge Planning  Outcome: Progressing Towards Goal  Goal: Medications  Outcome: Progressing Towards Goal  Goal: Respiratory  Outcome: Progressing Towards Goal  Goal: Treatments/Interventions/Procedures  Outcome: Progressing Towards Goal  Goal: Psychosocial  Outcome: Progressing Towards Goal     Problem: Sepsis: Day 4  Goal: Off Pathway (Use only if patient is Off Pathway)  Outcome: Progressing Towards Goal  Goal: Activity/Safety  Outcome: Progressing Towards Goal  Goal: Consults, if ordered  Outcome: Progressing Towards Goal  Goal: Diagnostic Test/Procedures  Outcome: Progressing Towards Goal  Goal: Nutrition/Diet  Outcome: Progressing Towards Goal  Goal: Discharge Planning  Outcome: Progressing Towards Goal  Goal: Medications  Outcome: Progressing Towards Goal  Goal: Respiratory  Outcome: Progressing Towards Goal  Goal: Treatments/Interventions/Procedures  Outcome: Progressing Towards Goal  Goal: Psychosocial  Outcome: Progressing Towards Goal  Goal: *Oxygen saturation within defined limits  Outcome: Progressing Towards Goal  Goal: *Hemodynamically stable  Outcome: Progressing Towards Goal  Goal: *Vital signs within defined limits  Outcome: Progressing Towards Goal  Goal: *Tolerating diet  Outcome: Progressing Towards Goal  Goal: *Demonstrates progressive activity  Outcome: Progressing Towards Goal  Goal: *Fluid volume maintenance  Outcome: Progressing Towards Goal     Problem: Sepsis: Day 5  Goal: Off Pathway (Use only if patient is Off Pathway)  Outcome: Progressing Towards Goal  Goal: *Oxygen saturation within defined limits  Outcome: Progressing Towards Goal  Goal: *Vital signs within defined limits  Outcome: Progressing Towards Goal  Goal: *Tolerating diet  Outcome: Progressing Towards Goal  Goal: *Demonstrates progressive activity  Outcome: Progressing Towards Goal  Goal: *Discharge plan identified  Outcome: Progressing Towards Goal  Goal: Activity/Safety  Outcome: Progressing Towards Goal  Goal: Consults, if ordered  Outcome: Progressing Towards Goal  Goal: Diagnostic Test/Procedures  Outcome: Progressing Towards Goal  Goal: Nutrition/Diet  Outcome: Progressing Towards Goal  Goal: Discharge Planning  Outcome: Progressing Towards Goal  Goal: Medications  Outcome: Progressing Towards Goal  Goal: Respiratory  Outcome: Progressing Towards Goal  Goal: Treatments/Interventions/Procedures  Outcome: Progressing Towards Goal  Goal: Psychosocial  Outcome: Progressing Towards Goal     Problem: Sepsis: Day 6  Goal: Off Pathway (Use only if patient is Off Pathway)  Outcome: Progressing Towards Goal  Goal: *Oxygen saturation within defined limits  Outcome: Progressing Towards Goal  Goal: *Vital signs within defined limits  Outcome: Progressing Towards Goal  Goal: *Tolerating diet  Outcome: Progressing Towards Goal  Goal: *Demonstrates progressive activity  Outcome: Progressing Towards Goal  Goal: Influenza immunization  Outcome: Progressing Towards Goal  Goal: *Pneumococcal immunization  Outcome: Progressing Towards Goal  Goal: Activity/Safety  Outcome: Progressing Towards Goal  Goal: Diagnostic Test/Procedures  Outcome: Progressing Towards Goal  Goal: Nutrition/Diet  Outcome: Progressing Towards Goal  Goal: Discharge Planning  Outcome: Progressing Towards Goal  Goal: Medications  Outcome: Progressing Towards Goal  Goal: Respiratory  Outcome: Progressing Towards Goal  Goal: Treatments/Interventions/Procedures  Outcome: Progressing Towards Goal  Goal: Psychosocial  Outcome: Progressing Towards Goal     Problem: Sepsis: Discharge Outcomes  Goal: *Vital signs within defined limits  Outcome: Progressing Towards Goal  Goal: *Tolerating diet  Outcome: Progressing Towards Goal  Goal: *Verbalizes understanding and describes prescribed diet  Outcome: Progressing Towards Goal  Goal: *Demonstrates progressive activity  Outcome: Progressing Towards Goal  Goal: *Describes follow-up/return visits to physicians  Outcome: Progressing Towards Goal  Goal: *Verbalizes name, dosage, time, side effects, and number of days to continue medications  Outcome: Progressing Towards Goal  Goal: *Influenza immunization (Oct-Mar only)  Outcome: Progressing Towards Goal  Goal: *Pneumococcal immunization  Outcome: Progressing Towards Goal  Goal: *Lungs clear or at baseline  Outcome: Progressing Towards Goal  Goal: *Oxygen saturation returns to baseline or 90% or better on room air  Outcome: Progressing Towards Goal  Goal: *Glycemic control  Outcome: Progressing Towards Goal  Goal: *Absence of deep venous thrombosis signs and symptoms(Stroke Metric)  Outcome: Progressing Towards Goal  Goal: *Describes available resources and support systems  Outcome: Progressing Towards Goal  Goal: *Optimal pain control at patient's stated goal  Outcome: Progressing Towards Goal     Problem: Pressure Injury - Risk of  Goal: *Prevention of pressure injury  Description: Document Gordo Scale and appropriate interventions in the flowsheet. Outcome: Progressing Towards Goal  Note: Pressure Injury Interventions:  Sensory Interventions: Assess changes in LOC, Discuss PT/OT consult with provider, Keep linens dry and wrinkle-free, Minimize linen layers, Monitor skin under medical devices    Moisture Interventions: Absorbent underpads, Assess need for specialty bed, Check for incontinence Q2 hours and as needed, Minimize layers, Moisture barrier    Activity Interventions: Assess need for specialty bed, Increase time out of bed, Pressure redistribution bed/mattress(bed type), PT/OT evaluation    Mobility Interventions: Assess need for specialty bed, Chair cushion, PT/OT evaluation    Nutrition Interventions: Document food/fluid/supplement intake, Discuss nutritional consult with provider, Offer support with meals,snacks and hydration                     Problem: Patient Education: Go to Patient Education Activity  Goal: Patient/Family Education  Outcome: Progressing Towards Goal     Problem: Falls - Risk of  Goal: *Absence of Falls  Description: Document Kristine Buck Fall Risk and appropriate interventions in the flowsheet.   Outcome: Progressing Towards Goal  Note: Fall Risk Interventions:            Medication Interventions: Patient to call before getting OOB, Teach patient to arise slowly, Bed/chair exit alarm, Assess postural VS orthostatic hypotension                   Problem: Patient Education: Go to Patient Education Activity  Goal: Patient/Family Education  Outcome: Progressing Towards Goal

## 2021-10-28 NOTE — PROGRESS NOTES
10/28/21 0055   Vitals   Temp 99.2 °F (37.3 °C)   Temp Source Oral   Pulse (Heart Rate) (!) 105   Heart Rate Source Monitor   Resp Rate 25   Level of Consciousness Alert (0)   /72   MAP (Calculated) 90   BP 1 Location Right upper arm   BP 1 Method Automatic   BP Patient Position At rest   MEWS Score 3   Pt is asymptomatic. Charge RN aware.  Will continue to monitor

## 2021-10-28 NOTE — PROGRESS NOTES
Transition of Care Plan:     RUR:9 %, low risk  Disposition: Home   Follow up appointments:PCP and specialists as indicated  DME needed: None   Transportation at [address] [place] [address] will provide  Transport around [facility] or means to access home:  Yes      IM Medicare Letter: N/A commercial insurance   Is patient a BCPI-A Bundle:    N/A                  If yes, was Bundle Letter given?:     Caregiver Contact: none   Discharge Caregiver contacted prior to discharge?            N/A    Initial note:  Chart reviewed. D/C acknowledged. F/U PCP appt secured. CM contacted pt via telephone due to isolation status to discuss d/c plan. She is agreeable to this. Her roommate will be here around 2pm for transport. Pt is ready for d/c from a CM standpoint. Care Management Interventions  PCP Verified by CM: Yes  Last Visit to PCP: 09/13/21  Palliative Care Criteria Met (RRAT>21 & CHF Dx)?: No  Mode of Transport at Discharge:  Other (see comment) (Pt roommate will transport)  Transition of Care Consult (CM Consult): Discharge Planning  Discharge Durable Medical Equipment: No  Physical Therapy Consult: No  Occupational Therapy Consult: No  Speech Therapy Consult: No  Support Systems: Friend/Neighbor  Confirm Follow Up Transport: Friends  The Patient and/or Patient Representative was Provided with a Choice of Provider and Agrees with the Discharge Plan?: No  Freedom of Choice List was Provided with Basic Dialogue that Supports the Patient's Individualized Plan of Care/Goals, Treatment Preferences and Shares the Quality Data Associated with the Providers?: No  [place] Resource Information Provided?: No  Discharge Location  Discharge Placement: Home     IRMA Rodriguez  Care Manager  [phone]

## 2021-10-29 ENCOUNTER — PATIENT OUTREACH (OUTPATIENT)
Dept: OTHER | Age: 46
End: 2021-10-29

## 2021-10-29 ENCOUNTER — TELEPHONE (OUTPATIENT)
Dept: FAMILY MEDICINE CLINIC | Age: 46
End: 2021-10-29

## 2021-10-29 NOTE — PROGRESS NOTES
Care Transitions Initial Call    Call within 2 business days of discharge: Yes     Patient: Britt Phillips Patient : 1975 MRN: [id number]  Went to [facility], was told she was septic and referred to ER. Last Discharge  [address]       Complaint Diagnosis Description Type Department Provider    10/24/21 Fever; Flank Pain; Vomiting; Bladder Infection Pyelonephritis . .. ED to Hosp-Admission (Discharged) (ADMIT) Coco Frederick MD; Rosa Perea... Was this an external facility discharge? No Discharge Facility: [address] her glucose 4 x day, taking her diabetes. Pushing fluids. Pain from kidney infection. Challenges to be reviewed by the provider   Additional needs identified to be addressed with provider: yes  patient requested that I call to ask for an earlier appointment, currently scheduled for . She also needs a note to excuse her from getting her 1st dose, COVID vaccine. Spoke with Parth Menendez, nothing sooner, will send message to the provider. They will reach out to patient to discuss vaccine note and back to work clearance. Method of communication with provider : phone     Condition Focused Assessment (kidney infection)    Is patient on dialysis? no  Dialysis access site: n/a  Activity level- moving several times a day, or as recommended? yes  Abnormal activity level reported: n/a   Any edema? no  Location of any swelling: (at iv site)  Nutrition- prescribed diet? no   Diet prescribed or recommended: Regular diet, knows what to eat for diabetes  Any of the following?    Shortness of breath or difficulty breathing yes, doesn't own a pulse ox, exertion  Dizziness/lightheadedness yes - getting up slowly, no assistive devices  Fever no   Fatigue yes     Anxiety yes  - has not spoken with a therapist, no medications, has therapy dog  Confusion no   Unexplained change in weight loss no  New or worsening pain? no  If yes, pain rated 0-10: 3-4 Location/pain characteristics: left lower back, left flank and abdominal pain - doesn't like to take percocet, took one last night to sleep   New or worsening numbness or tingling? no  If yes, location of numbness and tingling: n/a  Difficulty sleeping? Yes-normal for her to wake every two hours    Advance Care Planning:   Does patient have an Advance Directive: not on file; education provided. Inpatient Readmission Risk score: Unplanned Readmit Risk Score: 9.4    Was this a readmission? no   Patient stated reason for the admission: n/a    Patients top risk factors for readmission: medical condition-diabetes   Interventions to address risk factors: Scheduled appointment with PCP- and Obtained and reviewed discharge summary and/or continuity of care documents    Care Transition Nurse (CTN) contacted the patient by telephone to perform post hospital discharge assessment. Verified name and  with patient as identifiers. Provided introduction to self, and explanation of the CTN role. CTN reviewed discharge instructions, medical action plan and red flags with patient who verbalized understanding. Were discharge instructions available to patient? yes. Reviewed appropriate site of care based on symptoms and resources available to patient including: PCP, Benefits related nurse triage line, Charles Hupmhreys, When to call 911 and ICEdot. Patient given an opportunity to ask questions and does not have any further questions or concerns at this time. The patient agrees to contact the PCP office for questions related to their healthcare. Medication reconciliation was performed with patient, who verbalizes understanding of administration of home medications. Advised obtaining a 90-day supply of all daily and as-needed medications.    Referral to Pharm D needed: patient declined Pharmacy consult through [facility] regarding Diabetes, declined [facility] - too busy to enroll    Home Health/Outpatient orders at discharge: [address]: n/a  Date of initial visit: [facility] ordered at discharge: None  [facility] received: n/a    Discussed follow-up appointments. If no appointment was previously scheduled, appointment scheduling offered: yes. Is follow up appointment scheduled within 7 days of discharge? yes. [facility] follow up appointment(s):   Future Appointments   Date Time Provider Melania Meyer   11/5/2021 10:30 AM Larwence Romo MD [facility]     Non-BS follow up appointment(s): n/a    Plan for follow-up call in 5-7 days based on severity of symptoms and risk factors. Plan for next call: Call on 11/2  CTN provided contact information for future needs. Goals Addressed                 This Visit's Progress     Completes all follow-up appointments within 30 days of ED visit         Educate and encourage importance of FU for prevention of complications or disease;   Assess the patient's relationship with a PCP and next FU visit scheduled;  o F/up scheduled for 11/5, called PCP office to attempt to schedule an appointment sooner per patient's request, they did not have an openings at this time. The provider's office will reach back out to patient to discuss concerns.  Discuss importance of adherence to treatment plan and follow up visits;   Identify any barriers in transportation or access to FU appointments.  Assist patient with making FU appointments as needed;    It's also a good idea to know your test results.  Keep a list of the medicines you take.        Demonstrates no return to ED or red flags within 30 days        · Assess patient knowledge of how to contact the provider for questions if needed;  · Educate patient on importance of monitoring for any red flags;  · Review importance of reporting any changes, red flags to the provider and/or PCP;  · Assess patient's knowledge of discharge instructions and any restrictions;  · Educate patient when to call 911;  · Assess for any barriers with safety at home  · Discuss use of nurse access line and location of number on front of insurance card.   · Supportive Resources:  · [facility] - # [phone]  · [facility] [employer] 24/7 (virtual visits 24/7)  · [employer] # [phone] PW: [facility] for associates  · Nurse Access line 24/7 # [phone]  · [facility] (www.[email or web address])  · [address] [phone]  · [address] [facility] [phone]  · [employer]  [facility]   · [employer]  · IT - [phone]  · MyChart Help - 1- [phone]  · Associate Services for advice and direction, by calling [phone] and pressing 1

## 2021-10-29 NOTE — PROGRESS NOTES
DISCHARGE SUMMARY FROM [facility] NURSE    The patient is stable for discharge. I have reviewed the discharge instructions with the patient. The patient verbalized understanding. All questions were fully answered. The patient verbalized no complaints. Hard scripts and medication handouts were given and reviewed with the patient. Appropriate educational materials and medication side effects teaching were provided also provided. Cardiac monitor and IV line(s) were removed. The following personal items collected during admission were returned to the patient/family  Home medications:   Dental Appliance: Dental Appliances: Other (comment) (bridge cemented in)  Vision: Visual Aid: Glasses, With patient  Hearing Aid:    Jewelry: Jewelry: None  Clothing: Clothing: Pants, Shirt, Undergarments, Footwear  Other Valuables: Other Valuables: Cell Phone, Joce Lauren, With patient, Other (comment) (phone )  Valuables sent to safe: Personal Items Sent to Safe:  none    There were no personal belongings, valuables or home medications left at patient's bedside,  or safe.

## 2021-10-29 NOTE — PROGRESS NOTES
Problem: Patient Education: Go to Patient Education Activity  Goal: Patient/Family Education  Outcome: Resolved/Met     Problem: Sepsis: Day of Diagnosis  Goal: Off Pathway (Use only if patient is Off Pathway)  Outcome: Resolved/Met  Goal: *Fluid resuscitation  Outcome: Resolved/Met  Goal: *Paired blood cultures prior to first dose of antibiotic  Outcome: Resolved/Met  Goal: *First dose of  appropriate antibiotic within 3 hours of arrival to ED, within 1 hour of arrival to ICU  Outcome: Resolved/Met  Goal: *Lactic acid with first set of blood cultures  Outcome: Resolved/Met  Goal: *Pneumococcal immunization (if eligible)  Outcome: Resolved/Met  Goal: *Influenza immunization (if eligible)  Outcome: Resolved/Met  Goal: Activity/Safety  Outcome: Resolved/Met  Goal: Consults, if ordered  Outcome: Resolved/Met  Goal: Diagnostic Test/Procedures  Outcome: Resolved/Met  Goal: Nutrition/Diet  Outcome: Resolved/Met  Goal: Discharge Planning  Outcome: Resolved/Met  Goal: Medications  Outcome: Resolved/Met  Goal: Respiratory  Outcome: Resolved/Met  Goal: Treatments/Interventions/Procedures  Outcome: Resolved/Met  Goal: Psychosocial  Outcome: Resolved/Met     Problem: Sepsis: Day 2  Goal: Off Pathway (Use only if patient is Off Pathway)  Outcome: Resolved/Met  Goal: *Central Venous Pressure maintained at 8-12 mm Hg  Outcome: Resolved/Met  Goal: *Hemodynamically stable  Outcome: Resolved/Met  Goal: *Tolerating diet  Outcome: Resolved/Met  Goal: Activity/Safety  Outcome: Resolved/Met  Goal: Consults, if ordered  Outcome: Resolved/Met  Goal: Diagnostic Test/Procedures  Outcome: Resolved/Met  Goal: Nutrition/Diet  Outcome: Resolved/Met  Goal: Discharge Planning  Outcome: Resolved/Met  Goal: Medications  Outcome: Resolved/Met  Goal: Respiratory  Outcome: Resolved/Met  Goal: Treatments/Interventions/Procedures  Outcome: Resolved/Met  Goal: Psychosocial  Outcome: Resolved/Met     Problem: Sepsis: Day 3  Goal: Off Pathway (Use only if patient is Off Pathway)  Outcome: Resolved/Met  Goal: *Central Venous Pressure maintained at 8-12 mm Hg  Outcome: Resolved/Met  Goal: *Oxygen saturation within defined limits  Outcome: Resolved/Met  Goal: *Vital sign stability  Outcome: Resolved/Met  Goal: *Tolerating diet  Outcome: Resolved/Met  Goal: *Demonstrates progressive activity  Outcome: Resolved/Met  Goal: Activity/Safety  Outcome: Resolved/Met  Goal: Consults, if ordered  Outcome: Resolved/Met  Goal: Diagnostic Test/Procedures  Outcome: Resolved/Met  Goal: Nutrition/Diet  Outcome: Resolved/Met  Goal: Discharge Planning  Outcome: Resolved/Met  Goal: Medications  Outcome: Resolved/Met  Goal: Respiratory  Outcome: Resolved/Met  Goal: Treatments/Interventions/Procedures  Outcome: Resolved/Met  Goal: Psychosocial  Outcome: Resolved/Met     Problem: Sepsis: Day 4  Goal: Off Pathway (Use only if patient is Off Pathway)  Outcome: Resolved/Met  Goal: Activity/Safety  Outcome: Resolved/Met  Goal: Consults, if ordered  Outcome: Resolved/Met  Goal: Diagnostic Test/Procedures  Outcome: Resolved/Met  Goal: Nutrition/Diet  Outcome: Resolved/Met  Goal: Discharge Planning  Outcome: Resolved/Met  Goal: Medications  Outcome: Resolved/Met  Goal: Respiratory  Outcome: Resolved/Met  Goal: Treatments/Interventions/Procedures  Outcome: Resolved/Met  Goal: Psychosocial  Outcome: Resolved/Met  Goal: *Oxygen saturation within defined limits  Outcome: Resolved/Met  Goal: *Hemodynamically stable  Outcome: Resolved/Met  Goal: *Vital signs within defined limits  Outcome: Resolved/Met  Goal: *Tolerating diet  Outcome: Resolved/Met  Goal: *Demonstrates progressive activity  Outcome: Resolved/Met  Goal: *Fluid volume maintenance  Outcome: Resolved/Met     Problem: Sepsis: Day 5  Goal: Off Pathway (Use only if patient is Off Pathway)  Outcome: Resolved/Met  Goal: *Oxygen saturation within defined limits  Outcome: Resolved/Met  Goal: *Vital signs within defined limits  Outcome: Resolved/Met  Goal: *Tolerating diet  Outcome: Resolved/Met  Goal: *Demonstrates progressive activity  Outcome: Resolved/Met  Goal: *Discharge plan identified  Outcome: Resolved/Met  Goal: Activity/Safety  Outcome: Resolved/Met  Goal: Consults, if ordered  Outcome: Resolved/Met  Goal: Diagnostic Test/Procedures  Outcome: Resolved/Met  Goal: Nutrition/Diet  Outcome: Resolved/Met  Goal: Discharge Planning  Outcome: Resolved/Met  Goal: Medications  Outcome: Resolved/Met  Goal: Respiratory  Outcome: Resolved/Met  Goal: Treatments/Interventions/Procedures  Outcome: Resolved/Met  Goal: Psychosocial  Outcome: Resolved/Met     Problem: Sepsis: Day 6  Goal: Off Pathway (Use only if patient is Off Pathway)  Outcome: Resolved/Met  Goal: *Oxygen saturation within defined limits  Outcome: Resolved/Met  Goal: *Vital signs within defined limits  Outcome: Resolved/Met  Goal: *Tolerating diet  Outcome: Resolved/Met  Goal: *Demonstrates progressive activity  Outcome: Resolved/Met  Goal: Influenza immunization  Outcome: Resolved/Met  Goal: *Pneumococcal immunization  Outcome: Resolved/Met  Goal: Activity/Safety  Outcome: Resolved/Met  Goal: Diagnostic Test/Procedures  Outcome: Resolved/Met  Goal: Nutrition/Diet  Outcome: Resolved/Met  Goal: Discharge Planning  Outcome: Resolved/Met  Goal: Medications  Outcome: Resolved/Met  Goal: Respiratory  Outcome: Resolved/Met  Goal: Treatments/Interventions/Procedures  Outcome: Resolved/Met  Goal: Psychosocial  Outcome: Resolved/Met     Problem: Sepsis: Discharge Outcomes  Goal: *Vital signs within defined limits  Outcome: Resolved/Met  Goal: *Tolerating diet  Outcome: Resolved/Met  Goal: *Verbalizes understanding and describes prescribed diet  Outcome: Resolved/Met  Goal: *Demonstrates progressive activity  Outcome: Resolved/Met  Goal: *Describes follow-up/return visits to physicians  Outcome: Resolved/Met  Goal: *Verbalizes name, dosage, time, side effects, and number of days to continue medications  Outcome: Resolved/Met  Goal: *Influenza immunization (Oct-Mar only)  Outcome: Resolved/Met  Goal: *Pneumococcal immunization  Outcome: Resolved/Met  Goal: *Lungs clear or at baseline  Outcome: Resolved/Met  Goal: *Oxygen saturation returns to baseline or 90% or better on room air  Outcome: Resolved/Met  Goal: *Glycemic control  Outcome: Resolved/Met  Goal: *Absence of deep venous thrombosis signs and symptoms(Stroke Metric)  Outcome: Resolved/Met  Goal: *Describes available resources and support systems  Outcome: Resolved/Met  Goal: *Optimal pain control at patient's stated goal  Outcome: Resolved/Met     Problem: Pressure Injury - Risk of  Goal: *Prevention of pressure injury  Description: Document Gordo Scale and appropriate interventions in the flowsheet. Outcome: Resolved/Met     Problem: Patient Education: Go to Patient Education Activity  Goal: Patient/Family Education  Outcome: Resolved/Met     Problem: Falls - Risk of  Goal: *Absence of Falls  Description: Document Marilia Fall Risk and appropriate interventions in the flowsheet.   Outcome: Resolved/Met     Problem: Patient Education: Go to Patient Education Activity  Goal: Patient/Family Education  Outcome: Resolved/Met

## 2021-10-29 NOTE — TELEPHONE ENCOUNTER
I do not see any sooner appointment for HOWARD, unless you double book. For COVID wavier, a believe an appointment is necessary because organization will request notes.

## 2021-10-29 NOTE — TELEPHONE ENCOUNTER
Called and spoke with pt, and she has been advised and states understanding of this. Pt to keep appointment for 11/05/2021. Pt also states she is on short term disability. Advised pt we do not have any paperwork on her. Pt states she will call them today, and asked that forms be faxed to office.

## 2021-10-29 NOTE — TELEPHONE ENCOUNTER
I am not waiving her COVID-19 immunization. Her recent health problems are even more reason for her to get it. She will need to wait to see me on the 5th.

## 2021-10-29 NOTE — TELEPHONE ENCOUNTER
Pt requesting sooner appointment than 11/5/21 for hospital follow up with Dr. Tramaine Patel and also needs note for work waiving her from receiving Covid vaccine due 11/1/21 due to current medical condition.  Aline

## 2021-10-30 LAB
BACTERIA SPEC CULT: ABNORMAL
SERVICE CMNT-IMP: ABNORMAL

## 2021-11-01 LAB
BACTERIA SPEC CULT: NORMAL
SERVICE CMNT-IMP: NORMAL

## 2021-11-02 ENCOUNTER — PATIENT OUTREACH (OUTPATIENT)
Dept: OTHER | Age: 46
End: 2021-11-02

## 2021-11-02 NOTE — PROGRESS NOTES
Attempt to reach patient for follow up. Discreet VM left with contact information. Will attempt another outreach in a week, 11/9. Per chart review, patient has f/up appointment on 11/5 with her PCP, Dr. Felicia Wilson.

## 2021-11-05 ENCOUNTER — VIRTUAL VISIT (OUTPATIENT)
Dept: FAMILY MEDICINE CLINIC | Age: 46
End: 2021-11-05
Payer: COMMERCIAL

## 2021-11-05 DIAGNOSIS — Z09 HOSPITAL DISCHARGE FOLLOW-UP: Primary | ICD-10-CM

## 2021-11-05 DIAGNOSIS — N12 PYELONEPHRITIS: ICD-10-CM

## 2021-11-05 DIAGNOSIS — R78.81 BACTEREMIA: ICD-10-CM

## 2021-11-05 DIAGNOSIS — E11.9 TYPE 2 DIABETES MELLITUS WITHOUT COMPLICATION, WITHOUT LONG-TERM CURRENT USE OF INSULIN (HCC): ICD-10-CM

## 2021-11-05 PROCEDURE — 1111F DSCHRG MED/CURRENT MED MERGE: CPT | Performed by: FAMILY MEDICINE

## 2021-11-05 PROCEDURE — 99495 TRANSJ CARE MGMT MOD F2F 14D: CPT | Performed by: FAMILY MEDICINE

## 2021-11-05 NOTE — PROGRESS NOTES
Virtual Transitional Care Management Progress Note    Patient: Gustavo Greene  : 1975  PCP: Kimmie Miller MD    Date of office visit: 2021   Date of admission: 10/24/21  Date of discharge: 10/28/21  Hospital: [facility]    Call initiated w/i 2 business dates of discharge: Yes   Date of the most recent call to the patient: 10/29/2021 10:14 AM      Assessment/Plan:     Diagnoses and all orders for this visit:    1. Hospital discharge follow-up  -     KS DISCHARGE MEDS RECONCILED W/ CURRENT OUTPATIENT MED LIST    2. Pyelonephritis    3. Bacteremia    4. Type 2 diabetes mellitus without complication, without long-term current use of insulin (HCC)  -     liraglutide (VICTOZA) 0.6 mg/0.1 mL (18 mg/3 mL) pnij; 1.8 mg by SubCUTAneous route daily. Still recovering as expected  Diabetes greatly improved  Continue current plans. Refills per orders    Follow-up and Dispositions    · Return in 6 weeks (on 2021), or if symptoms worsen or fail to improve, for diabetes. Subjective:   Gustavo Greene is a 42 y.o. female presenting today for follow-up after hospital discharge. This encounter and supporting documentation was reviewed if available. Medication reconciliation was performed today. The main problem requiring admission was pyelonephritis with bacteremia. Complications during admission: none.       DISCHARGE DIAGNOSIS:     Pyelonephritis  Bacteremia  DM      Interval history/Current status: she is better but still a little weak. Also, she was started on insulin due to elevated glucose levels with a recent hemoglobin A1c of greater than 11. Patient was provided teaching and education. Her FBS have been 116 - 140. She also needs FMLA paperwork filled out. Also, she needs her COVID-19 immunization for work and wants to know when she should get it.   (I have recommended the week of the .)    Admitting symptoms have: improved      Medications marked \"taking\" at this time:  Prior to Admission medications    Medication Sig Start Date End Date Taking? Authorizing Provider   insulin glargine (Lantus Solostar U-100 Insulin) 100 unit/mL (3 mL) inpn 40 Units by SubCUTAneous route nightly. 10/28/21  Yes Avery Keen MD   Blood-Glucose Meter monitoring kit Please take readings 4 times a day, before breakfast, lunch, dinner and at bedtime 10/28/21  Yes Avery Keen MD   glucose blood VI test strips (Glucocom Glucose) strip TID AC, QHs 10/28/21  Yes Avery Keen MD   lancets-blood glucose strips 30 gauge cmpk 1 Package by Does Not Apply route Before breakfast, lunch, dinner and at bedtime. 10/28/21  Yes Avery Keen MD   cephALEXin (Keflex) 500 mg capsule Take 1 Capsule by mouth four (4) times daily for 10 days. 10/28/21 11/7/21 Yes Avery Keen MD   liraglutide (VICTOZA) 0.6 mg/0.1 mL (18 mg/3 mL) pnij 1.8 mg by SubCUTAneous route daily. 9/14/21  Yes Alejandro Cain MD   glipiZIDE SR (GLUCOTROL XL) 10 mg CR tablet Take 1 Tablet by mouth daily. TAKE BEFORE BREAKFAST 9/13/21  Yes Alejandro Cain MD   medroxyPROGESTERone (Provera) 10 mg tablet Take 10 mg by mouth daily. For 10 days out of month  Patient not taking: Reported on 10/29/2021  11/5/21  Provider, Historical   BD Ultra-Fine Short Pen Needle 31 gauge x 5/16\" ndle  5/21/21   Provider, Historical        Review of Systems   Constitutional: Positive for malaise/fatigue. Negative for chills and fever. Eyes: Negative for blurred vision. Genitourinary: Positive for flank pain and frequency. Negative for dysuria, hematuria and urgency. Endo/Heme/Allergies: Negative for polydipsia.                 Objective:     Patient-Reported Vitals 11/5/2021   Patient-Reported Weight 370lb   Patient-Reported Pulse 80   Patient-Reported SpO2 -   Patient-Reported Systolic  -   Patient-Reported Diastolic -        Physical Exam   Deferred as this was a phone visit    We discussed the expected course, resolution and complications of the diagnosis(es) in detail. Medication risks, benefits, costs, interactions, and alternatives were discussed as indicated. I advised her to contact the office if her condition worsens, changes or fails to improve as anticipated. She expressed understanding with the diagnosis(es) and plan. Bobo Grijalva, who was evaluated through a synchronous (real-time) audio-video encounter and/or her healthcare decision maker, is aware that it is a billable service, with coverage as determined by her insurance carrier. She provided verbal consent to proceed: Yes, and patient identification was verified. It was conducted pursuant to the emergency declaration under the [facility], [address] [place] [address] authority and the [employer] Act. A caregiver was present when appropriate. Ability to conduct physical exam was limited. I was in the office. The patient was at home.        Caden Montaño MD

## 2021-11-05 NOTE — PROGRESS NOTES
Winnie Chávez is a 42 y.o. female    Chief Complaint   Patient presents with   [facility] Follow Up      [id number] on 10/24/2021 for sepsis, pneumonia, and dehydration     Other     clearance for work        Health Maintenance Due   Topic Date Due    Foot Exam Q1  Never done    Eye Exam Retinal or Dilated  Never done    COVID-19 Vaccine (1) Never done    DTaP/Tdap/Td series (1 - Tdap) 06/09/2011    Flu Vaccine (1) Never done    MICROALBUMIN Q1  11/30/2021       There were no vitals taken for this visit. 3 most recent PHQ Screens 6/22/2021   PHQ Not Done Patient Decline   Little interest or pleasure in doing things Not at all   Feeling down, depressed, irritable, or hopeless Several days   Total Score PHQ 2 1       No flowsheet data found. Abuse Screening Questionnaire 11/5/2021   Do you ever feel afraid of your partner? N   Are you in a relationship with someone who physically or mentally threatens you? N   Is it safe for you to go home? Y         1. Have you been to the ER, urgent care clinic since your last visit? Hospitalized since your last visit? yes [id number] on 10/24/2021 for sepsis, pneumonia, and dehydration     2. Have you seen or consulted any other health care providers outside of the [address] [place] [address] since your last visit? Include any pap smears or colon screening. no

## 2021-11-08 ENCOUNTER — TELEPHONE (OUTPATIENT)
Dept: FAMILY MEDICINE CLINIC | Age: 46
End: 2021-11-08

## 2021-11-08 NOTE — TELEPHONE ENCOUNTER
Pt pt request, I have faxed letters done 11/5/21 by Dr Rosa Yoon to patients [email or web address]

## 2021-11-09 ENCOUNTER — PATIENT OUTREACH (OUTPATIENT)
Dept: OTHER | Age: 46
End: 2021-11-09

## 2021-11-09 NOTE — PROGRESS NOTES
Follow up phone call to patient, two pt identifiers verified. Discussed patient's goals:   Goals      Completes all follow-up appointments within 30 days of ED visit       Educate and encourage importance of FU for prevention of complications or disease;   Assess the patient's relationship with a PCP and next FU visit scheduled;  o F/up scheduled for 11/5, called PCP office to attempt to schedule an appointment sooner per patient's request, they did not have an openings at this time. The provider's office will reach back out to patient to discuss concerns.  Discuss importance of adherence to treatment plan and follow up visits;   Identify any barriers in transportation or access to FU appointments.  Assist patient with making FU appointments as needed;    It's also a good idea to know your test results.  Keep a list of the medicines you take. 11/9: Let her provider know she was still feeling weak, not eating. Has been cleared for back to work on 11/10.  Demonstrates no return to ED or red flags within 30 days      · Assess patient knowledge of how to contact the provider for questions if needed;  · Educate patient on importance of monitoring for any red flags;  · Review importance of reporting any changes, red flags to the provider and/or PCP;  · Assess patient's knowledge of discharge instructions and any restrictions;  · Educate patient when to call 911;  · Assess for any barriers with safety at home  · Discuss use of nurse access line and location of number on front of insurance card.   · Supportive Resources:  · [employer] - # [phone]  · [employer] 24/7 (virtual visits 24/7)  · [employer] # [phone] PW: [employer] for associates  · Nurse Access line 24/7 # [phone]  · [employer] ([email or web address])  · [address] [phone]  · [address] [facility] [phone]  · HR Service Now  [facility]   · [facility] Workday  · IT - [phone]  · [employer] - 1- [phone]  · Associate Services for advice and direction, by calling [phone] and pressing 1              Patient's primary care provider relationship reviewed with patient and modified, as applicable. Patient states she is going back to work on 11/10. Still feels weak and is not eating. Discussed her f/up appointment with her PCP. Stated that she could wait until Friday, 11/12 to go back to work but patient declined. She has a letter to excuse her for her first covid vaccine until the end of the month due to her illness. Readiness to Change: []  Pre-contemplative    []  Contemplative  []  Preparation               [x]  Action                  []  Maintenance    Barriers/Challenges to Care: []  Decline in memory    []  Language barrier     []  Emotional                  []  Limited mobility  []  Lack of motivation     [] Vision, hearing or cognitive impairment []  Knowledge [] Financial Barriers []  Lack of support  []  Pain []  Other [x]  None    Key pt activities to achieve better health:   [x]  Weight loss  []  Improved diabetic control  []  Decreased cholesterol levels  []  Decreased blood pressure  []    []    Plan for next call: Chart review, 11/30.

## 2021-11-10 ENCOUNTER — TELEPHONE (OUTPATIENT)
Dept: INTERNAL MEDICINE CLINIC | Age: 46
End: 2021-11-10

## 2021-11-10 NOTE — TELEPHONE ENCOUNTER
Patient states she does check her blood sugars because she's on lantus and victoza now. In the mornings, it's anywhere from 120-140. Doesn't check it later in the day. Blood sugars at night after she eats - about 4 hours after eating - 170-210. Has cut out her sodas - does maybe 1% soda. Tries to drink water. She did just get out of the hospital. When she was in the hospital recently- due to sepsis - sugars were >200-350. She was sent home on Lantus 40 units once daily. Diabetes Medication:  Lantus 40 units once daily  Glipizide ER 10mg once daily  Lantus 40 units once daily    Diet:  Tea - half and half or unsweetened tea with splenda  Will occasionally do jahaira-aid with splenclara  Tries not to eat a lot of pasta  When she does eat pasta, sugar goes above 200 - to 250/260    Exercise:  Very limited by ACL injury    She is motivated to get a1c down because this is the only way she'll be able to get her knee surgery that she needs. Scheduled virtual appointment for 12/7/21 at 1pm. 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 For Sheldon Mahoney in place:  Yes   Recommendation Provided To: Patient/Caregiver: 1 via Telephone   Intervention Detail: Scheduled Appointment   Intervention Accepted By: Patient/Caregiver: 1   Time Spent (min): 20

## 2021-11-10 NOTE — TELEPHONE ENCOUNTER
Patient referred by PCP for diabetes management. Left message for patient to call me back directly at [phone].     Amira Meyer, PharmD, BCPS, [facility]

## 2021-11-22 NOTE — TELEPHONE ENCOUNTER
Pt called to advise Dr. Lennox Moros she's still having kidney pain, left side since hospital discharge, notes urinary frequency, declined appointment, but wants to know if Dr. Lennox Moros thinks this is normal since she was recently hospitalized for kidney infection with kidney failure.  Aline

## 2021-11-22 NOTE — TELEPHONE ENCOUNTER
She needs to be seen tomorrow either here or at urgent care. Also[facility]

## 2021-11-29 DIAGNOSIS — E11.9 TYPE 2 DIABETES MELLITUS WITHOUT COMPLICATION, WITHOUT LONG-TERM CURRENT USE OF INSULIN (HCC): ICD-10-CM

## 2021-11-30 ENCOUNTER — PATIENT OUTREACH (OUTPATIENT)
Dept: OTHER | Age: 46
End: 2021-11-30

## 2021-11-30 NOTE — PROGRESS NOTES
Resolving current episode Transitions of care complete. No further ED/UC or hospital admissions within 30 days post discharge. Patient attended follow-up appointments as directed. No outreach from patient to [address] [place] [address].

## 2021-12-02 RX ORDER — GLIPIZIDE 10 MG/1
10 TABLET, FILM COATED, EXTENDED RELEASE ORAL DAILY
Qty: 90 TABLET | Refills: 0 | Status: SHIPPED | OUTPATIENT
Start: 2021-12-02 | End: 2022-06-22

## 2021-12-02 RX ORDER — INSULIN GLARGINE 100 [IU]/ML
40 INJECTION, SOLUTION SUBCUTANEOUS
Qty: 15 PEN | Refills: 0 | Status: SHIPPED | OUTPATIENT
Start: 2021-12-02 | End: 2022-01-11 | Stop reason: SDUPTHER

## 2021-12-07 ENCOUNTER — TELEPHONE (OUTPATIENT)
Dept: FAMILY MEDICINE CLINIC | Age: 46
End: 2021-12-07

## 2021-12-07 NOTE — TELEPHONE ENCOUNTER
----- Message from Margie Quintana sent at 12/6/2021  5:02 PM EST -----  Subject: Message to Provider    QUESTIONS  Information for Provider? patient was returning a call from the Dr and not   sure why they was calling.  ---------------------------------------------------------------------------  --------------  [address]  What is the best way for the office to contact you? OK to leave message on   voicemail  Preferred Call Back Phone Number? [phone]  ---------------------------------------------------------------------------  --------------  SCRIPT ANSWERS  Relationship to Patient?  Self

## 2021-12-07 NOTE — TELEPHONE ENCOUNTER
Returned pt's call and left a voice message. Advised no one from our office called directly, however, she does have a 1300 virtual on 12/09/2021 with Mikhail Long D  And that may have been her reminder call.

## 2021-12-09 ENCOUNTER — VIRTUAL VISIT (OUTPATIENT)
Dept: INTERNAL MEDICINE CLINIC | Age: 46
End: 2021-12-09

## 2021-12-09 DIAGNOSIS — E11.9 TYPE 2 DIABETES MELLITUS WITHOUT COMPLICATION, WITH LONG-TERM CURRENT USE OF INSULIN (HCC): Primary | ICD-10-CM

## 2021-12-09 DIAGNOSIS — Z79.4 TYPE 2 DIABETES MELLITUS WITHOUT COMPLICATION, WITH LONG-TERM CURRENT USE OF INSULIN (HCC): Primary | ICD-10-CM

## 2021-12-09 RX ORDER — FLASH GLUCOSE SENSOR
KIT MISCELLANEOUS
Qty: 2 KIT | Refills: 2 | Status: SHIPPED | OUTPATIENT
Start: 2021-12-09 | End: 2022-06-27

## 2021-12-09 RX ORDER — SEMAGLUTIDE 1.34 MG/ML
INJECTION, SOLUTION SUBCUTANEOUS
Qty: 1 BOX | Refills: 1 | Status: SHIPPED
Start: 2021-12-09 | End: 2021-12-09 | Stop reason: CLARIF

## 2021-12-09 RX ORDER — INSULIN LISPRO 100 [IU]/ML
INJECTION, SOLUTION INTRAVENOUS; SUBCUTANEOUS
Qty: 5 PEN | Refills: 1 | Status: SHIPPED | OUTPATIENT
Start: 2021-12-09 | End: 2022-01-11 | Stop reason: SDUPTHER

## 2021-12-09 RX ORDER — DULAGLUTIDE 0.75 MG/.5ML
0.75 INJECTION, SOLUTION SUBCUTANEOUS
Qty: 4 PEN | Refills: 0 | Status: SHIPPED
Start: 2021-12-09 | End: 2021-12-21 | Stop reason: CLARIF
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
## 2021-12-09 NOTE — PROGRESS NOTES
Patient was seen with PharmD [id number] candidate Donato Chacon. I was present for the visit and agree with the assessment and plan. Please see his note for more details of the visit.    -Patient recently started herself on Humalog (from her aunt) - was doing 10 units at lunch and 10-20 units at bedtime (NOT at dinner)  -Educated on Humalog dosing and advised 10 units 3 times daily with meals  -Was on metformin for 2 years - had diarrhea and it was impacting her diverticulitis  -Change victoza to ozempic - ozempic was not covered at first so I switched to trulicity - however, PA was approved so now patient proceeding with Ozempic  -Continue Lantus 30 units once daily and Glipizide ER 10mg once daily (will consider changing/discontinuing in future in future)  -start patient on sensor - was covered by SERGIO Payne at pharmacy- patient may need training on sensor - she works at [id number] so may see if my colleague Neo Rivero can see her.       Dilma Thomas, PharmD, BCPS, CDCES

## 2021-12-10 ENCOUNTER — TELEPHONE (OUTPATIENT)
Dept: FAMILY MEDICINE CLINIC | Age: 46
End: 2021-12-10

## 2021-12-10 RX ORDER — PEN NEEDLE, DIABETIC 30 GX3/16"
NEEDLE, DISPOSABLE MISCELLANEOUS
Qty: 100 EACH | Refills: 4 | Status: SHIPPED | OUTPATIENT
Start: 2021-12-10 | End: 2022-09-14 | Stop reason: SDUPTHER

## 2021-12-15 ENCOUNTER — TELEPHONE (OUTPATIENT)
Dept: INTERNAL MEDICINE CLINIC | Age: 46
End: 2021-12-15

## 2021-12-15 NOTE — TELEPHONE ENCOUNTER
Pharmacy Progress Note - Telephone Encounter    S/O: Ms. Alexus Castillo 42 y.o. female,  was contacted via an outbound telephone call today. Verified patients identifiers (name & ) per HIPAA policy. I am the clinical pharmacist collaborating with Aki Vuong, PharmD. Ms. 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 Interested in [employer] CGM education/application. A/P:  - Now scheduled to meet me on 21 for CGM education/application.   - Patient endorses understanding to the provided information. All questions answered at this time.        Thank you,  Nellie Mireles, PharmD, BCACP, [facility] 83 in place: No   Recommendation Provided To: Patient/Caregiver: 1 via Telephone   Intervention Detail: Scheduled Appointment   Gap Closed?:    Intervention Accepted By: Patient/Caregiver: 1   Time Spent (min): 10
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
## 2021-12-21 ENCOUNTER — OFFICE VISIT (OUTPATIENT)
Dept: INTERNAL MEDICINE CLINIC | Age: 46
End: 2021-12-21

## 2021-12-21 VITALS
DIASTOLIC BLOOD PRESSURE: 89 MMHG | BODY MASS INDEX: 43.4 KG/M2 | OXYGEN SATURATION: 97 % | WEIGHT: 293 LBS | HEART RATE: 82 BPM | HEIGHT: 69 IN | TEMPERATURE: 97.1 F | SYSTOLIC BLOOD PRESSURE: 146 MMHG

## 2021-12-21 DIAGNOSIS — Z79.4 TYPE 2 DIABETES MELLITUS WITHOUT COMPLICATION, WITH LONG-TERM CURRENT USE OF INSULIN (HCC): Primary | ICD-10-CM

## 2021-12-21 DIAGNOSIS — E11.9 TYPE 2 DIABETES MELLITUS WITHOUT COMPLICATION, WITH LONG-TERM CURRENT USE OF INSULIN (HCC): Primary | ICD-10-CM

## 2021-12-21 RX ORDER — SEMAGLUTIDE 1.34 MG/ML
0.25 INJECTION, SOLUTION SUBCUTANEOUS
COMMUNITY
Start: 2021-12-10 | End: 2022-02-03 | Stop reason: SDUPTHER

## 2021-12-21 NOTE — PATIENT INSTRUCTIONS
· Scan your sensor at least 4 times daily.     · [employer] -- this is the test strip for your meter

## 2022-01-04 ENCOUNTER — CLINICAL SUPPORT (OUTPATIENT)
Dept: PRIMARY CARE CLINIC | Age: 47
End: 2022-01-04
Payer: COMMERCIAL

## 2022-01-04 DIAGNOSIS — Z23 NEEDS FLU SHOT: Primary | ICD-10-CM

## 2022-01-04 PROCEDURE — 90471 IMMUNIZATION ADMIN: CPT

## 2022-01-04 PROCEDURE — 90686 IIV4 VACC NO PRSV 0.5 ML IM: CPT

## 2022-01-11 RX ORDER — INSULIN GLARGINE 100 [IU]/ML
40 INJECTION, SOLUTION SUBCUTANEOUS
Qty: 15 PEN | Refills: 0 | Status: SHIPPED | OUTPATIENT
Start: 2022-01-11 | End: 2022-02-22

## 2022-01-11 RX ORDER — INSULIN LISPRO 100 [IU]/ML
INJECTION, SOLUTION INTRAVENOUS; SUBCUTANEOUS
Qty: 5 PEN | Refills: 1 | Status: SHIPPED | OUTPATIENT
Start: 2022-01-11 | End: 2022-08-08

## 2022-02-03 ENCOUNTER — VIRTUAL VISIT (OUTPATIENT)
Dept: FAMILY MEDICINE CLINIC | Age: 47
End: 2022-02-03
Payer: COMMERCIAL

## 2022-02-03 ENCOUNTER — TELEPHONE (OUTPATIENT)
Dept: FAMILY MEDICINE CLINIC | Age: 47
End: 2022-02-03

## 2022-02-03 DIAGNOSIS — F41.9 ANXIETY: ICD-10-CM

## 2022-02-03 DIAGNOSIS — F33.2 SEVERE RECURRENT MAJOR DEPRESSION WITHOUT PSYCHOTIC FEATURES (HCC): ICD-10-CM

## 2022-02-03 DIAGNOSIS — Z79.4 TYPE 2 DIABETES MELLITUS WITHOUT COMPLICATION, WITH LONG-TERM CURRENT USE OF INSULIN (HCC): Primary | ICD-10-CM

## 2022-02-03 DIAGNOSIS — R30.0 DYSURIA: ICD-10-CM

## 2022-02-03 DIAGNOSIS — E11.9 TYPE 2 DIABETES MELLITUS WITHOUT COMPLICATION, WITH LONG-TERM CURRENT USE OF INSULIN (HCC): Primary | ICD-10-CM

## 2022-02-03 DIAGNOSIS — E78.00 HYPERCHOLESTEROLEMIA: ICD-10-CM

## 2022-02-03 DIAGNOSIS — F33.2 SEVERE RECURRENT MAJOR DEPRESSION WITHOUT PSYCHOTIC FEATURES (HCC): Primary | ICD-10-CM

## 2022-02-03 PROCEDURE — 99214 OFFICE O/P EST MOD 30 MIN: CPT | Performed by: FAMILY MEDICINE

## 2022-02-03 RX ORDER — SEMAGLUTIDE 1.34 MG/ML
0.5 INJECTION, SOLUTION SUBCUTANEOUS
Qty: 1 BOX | Refills: 1 | Status: SHIPPED | OUTPATIENT
Start: 2022-02-03 | End: 2022-05-13

## 2022-02-03 RX ORDER — CITALOPRAM 40 MG/1
TABLET, FILM COATED ORAL
COMMUNITY
End: 2022-02-03

## 2022-02-03 RX ORDER — SERTRALINE HYDROCHLORIDE 50 MG/1
50 TABLET, FILM COATED ORAL DAILY
Qty: 30 TABLET | Refills: 0 | Status: SHIPPED
Start: 2022-02-03 | End: 2022-02-04 | Stop reason: ALTCHOICE

## 2022-02-03 RX ORDER — MEDROXYPROGESTERONE ACETATE 10 MG/1
TABLET ORAL
COMMUNITY
End: 2022-02-03

## 2022-02-03 RX ORDER — CITALOPRAM 20 MG/1
20 TABLET, FILM COATED ORAL DAILY
Qty: 30 TABLET | Refills: 0 | Status: SHIPPED
Start: 2022-02-03 | End: 2022-02-03 | Stop reason: ALTCHOICE

## 2022-02-03 NOTE — TELEPHONE ENCOUNTER
Pharmacy somehow got Ozempic for her, please have her contact them. Also, please let her know that Celexa is not covered and I am substituting Zoloft instead. I have sent this to her pharmacy.

## 2022-02-03 NOTE — PROGRESS NOTES
Chief Complaint   Patient presents with    Diabetes     Follow up. Would like labs ordered.  Medication Refill    Anxiety     1. Have you been to the ER, urgent care clinic since your last visit? Hospitalized since your last visit? No    2. Have you seen or consulted any other health care providers outside of the [address] [place] [address] since your last visit? Include any pap smears or colon screening. No    .

## 2022-02-03 NOTE — TELEPHONE ENCOUNTER
Ozempic was ordered for pt, today. She she being taking this or a different medication? Ozempic is not covered.

## 2022-02-03 NOTE — TELEPHONE ENCOUNTER
PA for Sertraline-Denied. Not covered by plan. Original Claim Info  [address] [facility] HAS OTHER COVRG REQ CORRECT ID [place] prefers [id number] - FLUOXETINE CAP 20MG, [id number] - ESCITALOPRAM TAB 10MG, [id number] - CITALOPRAM TAB 20MG, [id number] - PARO    Unable to pull up formulary medication list online.

## 2022-02-03 NOTE — TELEPHONE ENCOUNTER
Ozempic- Information regarding your request  This request cannot be processed due to the medication is not covered by the plan. Please indicate the patients previous medication history in the free text field above (if available) for any of the following medications:   Metformin (Glucophage)    Glipizide (Glucotrol)    Glimepiride (Amaryl)    Glyburide (Glynase / Diabeta)    Pioglitazone (Actos)    Byetta (exenatide IR)    Bydureon (exenatide ER)    Trulicity (dulaglutide)    Victoza (liraglutide)    Other (please specify)      Citalopram-Information regarding your request  This request cannot be processed due to the medication is not covered by the plan.   Health Plans Preferred Products  FLUOXETINE CAP 20MG [id number]  SERTRALINE TAB 50MG [id number]  ESCITALOPRAM TAB 10MG [id number]  PAROXETINE TAB 20MG [id number]  PAROXETINE TAB 25MG

## 2022-02-04 ENCOUNTER — TELEPHONE (OUTPATIENT)
Dept: FAMILY MEDICINE CLINIC | Age: 47
End: 2022-02-04

## 2022-02-04 RX ORDER — CITALOPRAM 10 MG/1
10 TABLET ORAL DAILY
Qty: 30 TABLET | Refills: 0 | Status: SHIPPED
Start: 2022-02-04 | End: 2022-04-29

## 2022-02-04 RX ORDER — CEPHALEXIN 500 MG/1
500 CAPSULE ORAL 2 TIMES DAILY
Qty: 6 CAPSULE | Refills: 0 | Status: SHIPPED | OUTPATIENT
Start: 2022-02-04 | End: 2022-02-07

## 2022-02-04 NOTE — TELEPHONE ENCOUNTER
Called pt, and she has been advised and states understanding of new medication being sent to pharmacy. Pt states she will call office back to schedule follow up.

## 2022-02-04 NOTE — TELEPHONE ENCOUNTER
Pt called office and states she reviewed her labs and it appears she has a UTI. Pt will like an ABX sent to pharmacy.

## 2022-02-04 NOTE — TELEPHONE ENCOUNTER
Citalopram sent in instead. Please let the patient know. Also, she needs to follow up with me on her depression and anxiety in 3 weeks.

## 2022-02-07 ENCOUNTER — TELEPHONE (OUTPATIENT)
Dept: FAMILY MEDICINE CLINIC | Age: 47
End: 2022-02-07

## 2022-02-07 NOTE — TELEPHONE ENCOUNTER
Called discussed labs with pt who was not concerned and was happy with her result. Pt refused to make an apt bc she was supposed to f/u in three weeks anyway. Spoke about uti and complications.

## 2022-02-07 NOTE — TELEPHONE ENCOUNTER
Called pt, and left a voice message, Dr. Wesley Rosario did send an ABX to her pharmacy over the weekend. Advised is she has not picked up, to do so today.

## 2022-02-22 RX ORDER — INSULIN GLARGINE 100 [IU]/ML
INJECTION, SOLUTION SUBCUTANEOUS
Qty: 15 ADJUSTABLE DOSE PRE-FILLED PEN SYRINGE | Refills: 1 | Status: SHIPPED | OUTPATIENT
Start: 2022-02-22 | End: 2022-04-26

## 2022-03-16 NOTE — TELEPHONE ENCOUNTER
Citalopram PA Submitted-- Information regarding your request  This request cannot be processed due to the medication is not covered by the plan. Zoloft PA Submitted- Information regarding your request  This request cannot be processed due to the medication is not covered by the plan. For Citalopram 20MG- $10.51 at [facility]     For Zoloft 50MG - $10.13 at [facility].

## 2022-03-19 PROBLEM — A41.9 SEPSIS (HCC): Status: ACTIVE | Noted: 2021-10-24

## 2022-03-19 PROBLEM — E78.00 HYPERCHOLESTEROLEMIA: Status: ACTIVE | Noted: 2020-12-10

## 2022-04-21 ENCOUNTER — TELEPHONE (OUTPATIENT)
Dept: ORTHOPEDIC SURGERY | Age: 47
End: 2022-04-21

## 2022-04-21 NOTE — TELEPHONE ENCOUNTER
Patient requesting something for pain be sent in to her pharmacy for pain. She is currently taking Tramadol without relief.  She has also tried Diclofenac in the past.

## 2022-04-22 RX ORDER — CELECOXIB 200 MG/1
200 CAPSULE ORAL 2 TIMES DAILY
Qty: 180 CAPSULE | Refills: 0 | Status: SHIPPED
Start: 2022-04-22 | End: 2022-06-03

## 2022-04-26 RX ORDER — INSULIN GLARGINE 100 [IU]/ML
INJECTION, SOLUTION SUBCUTANEOUS
Qty: 12 ML | Refills: 1 | Status: SHIPPED | OUTPATIENT
Start: 2022-04-26 | End: 2022-08-08

## 2022-04-29 ENCOUNTER — OFFICE VISIT (OUTPATIENT)
Dept: OBGYN CLINIC | Age: 47
End: 2022-04-29
Payer: COMMERCIAL

## 2022-04-29 VITALS
BODY MASS INDEX: 54.05 KG/M2 | WEIGHT: 293 LBS | SYSTOLIC BLOOD PRESSURE: 134 MMHG | HEART RATE: 93 BPM | DIASTOLIC BLOOD PRESSURE: 71 MMHG

## 2022-04-29 DIAGNOSIS — Z12.4 PAP SMEAR FOR CERVICAL CANCER SCREENING: ICD-10-CM

## 2022-04-29 DIAGNOSIS — Z87.410 HISTORY OF CERVICAL DYSPLASIA: ICD-10-CM

## 2022-04-29 DIAGNOSIS — Z01.419 ENCOUNTER FOR WELL WOMAN EXAM WITH ROUTINE GYNECOLOGICAL EXAM: Primary | ICD-10-CM

## 2022-04-29 PROCEDURE — 99396 PREV VISIT EST AGE 40-64: CPT | Performed by: OBSTETRICS & GYNECOLOGY

## 2022-04-29 RX ORDER — MEDROXYPROGESTERONE ACETATE 10 MG/1
10 TABLET ORAL DAILY
Qty: 30 TABLET | Refills: 4 | Status: SHIPPED | OUTPATIENT
Start: 2022-04-29
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
## 2022-05-05 LAB
CYTOLOGIST CVX/VAG CYTO: NORMAL
CYTOLOGY CVX/VAG DOC CYTO: NORMAL
CYTOLOGY CVX/VAG DOC THIN PREP: NORMAL
CYTOLOGY HISTORY:: NORMAL
DX ICD CODE: NORMAL
HPV I/H RISK 4 DNA CVX QL PROBE+SIG AMP: NEGATIVE
Lab: NORMAL
OTHER STN SPEC: NORMAL
PATHOLOGIST CVX/VAG CYTO: NORMAL
STAT OF ADQ CVX/VAG CYTO-IMP: NORMAL

## 2022-05-24 NOTE — PROGRESS NOTES
Attempted several times to reach and voice mail is not set up. Detail Level: Zone Detail Level: Generalized Detail Level: Detailed

## 2022-05-31 ENCOUNTER — TELEPHONE (OUTPATIENT)
Dept: FAMILY MEDICINE CLINIC | Age: 47
End: 2022-05-31

## 2022-05-31 DIAGNOSIS — E11.9 TYPE 2 DIABETES MELLITUS WITHOUT COMPLICATION, WITH LONG-TERM CURRENT USE OF INSULIN (HCC): ICD-10-CM

## 2022-05-31 DIAGNOSIS — Z79.4 TYPE 2 DIABETES MELLITUS WITHOUT COMPLICATION, WITH LONG-TERM CURRENT USE OF INSULIN (HCC): ICD-10-CM

## 2022-05-31 DIAGNOSIS — N92.1 MENOMETRORRHAGIA: Primary | ICD-10-CM

## 2022-05-31 RX ORDER — DULAGLUTIDE 0.75 MG/.5ML
0.75 INJECTION, SOLUTION SUBCUTANEOUS
Qty: 4 PEN | Refills: 1 | Status: SHIPPED
Start: 2022-05-31 | End: 2022-06-01 | Stop reason: ALTCHOICE

## 2022-05-31 NOTE — TELEPHONE ENCOUNTER
Please call patient and let her know that it appears that Trulicity is covered and it is in the same class. I would like for her to switch to this and have sent in a prescription. She needs to follow up with me in 6-8 weeks to see how her blood sugars are doing on it.

## 2022-05-31 NOTE — TELEPHONE ENCOUNTER
Ozempic- Information regarding your request  This request cannot be processed due to the medication is not covered by the plan.   Please indicate the patients previous medication history in the free text field above (if available) for any of the following medications:   Metformin (Glucophage)    Glipizide (Glucotrol)    Glimepiride (Amaryl)    Glyburide (Glynase / Diabeta)    Pioglitazone (Actos)    Byetta (exenatide IR)    Bydureon (exenatide ER)    Trulicity (dulaglutide)    Victoza (liraglutide)

## 2022-05-31 NOTE — TELEPHONE ENCOUNTER
Patient called because 1 [facility] told her that the office is not doing anything to refill the Ozempic. 1 [employer] informed patient that she needs a prior Donnita Poughquag. Informed patient that it looks like the medication has been refilled and that she is just waiting on insurance. Also scheduled patient for a VV on 6/3/22.     Best call back number: [phone]

## 2022-06-01 ENCOUNTER — OFFICE VISIT (OUTPATIENT)
Dept: ORTHOPEDIC SURGERY | Age: 47
End: 2022-06-01

## 2022-06-01 VITALS — HEIGHT: 69 IN | BODY MASS INDEX: 43.4 KG/M2 | WEIGHT: 293 LBS

## 2022-06-01 DIAGNOSIS — Z79.4 TYPE 2 DIABETES MELLITUS WITHOUT COMPLICATION, WITH LONG-TERM CURRENT USE OF INSULIN (HCC): Primary | ICD-10-CM

## 2022-06-01 DIAGNOSIS — S83.231D COMPLEX TEAR OF MEDIAL MENISCUS OF RIGHT KNEE, SUBSEQUENT ENCOUNTER: Primary | ICD-10-CM

## 2022-06-01 DIAGNOSIS — E11.9 TYPE 2 DIABETES MELLITUS WITHOUT COMPLICATION, WITH LONG-TERM CURRENT USE OF INSULIN (HCC): Primary | ICD-10-CM

## 2022-06-01 DIAGNOSIS — E66.01 MORBID OBESITY WITH BMI OF 50.0-59.9, ADULT (HCC): ICD-10-CM

## 2022-06-01 PROCEDURE — 0232T NJX PLATELET PLASMA: CPT | Performed by: ORTHOPAEDIC SURGERY

## 2022-06-01 RX ORDER — SEMAGLUTIDE 1.34 MG/ML
1 INJECTION, SOLUTION SUBCUTANEOUS
Qty: 4 EACH | Refills: 1 | Status: SHIPPED | OUTPATIENT
Start: 2022-06-01 | End: 2022-09-14

## 2022-06-01 NOTE — PROGRESS NOTES
Identified pt with two pt identifiers (name and ). Reviewed chart in preparation for visit and have obtained necessary documentation. Belkys Hernandez is a 41 y.o. female  Chief Complaint   Patient presents with    Joint Or Tendon Injection     PRP      Visit Vitals  Ht 5' 9\" (1.753 m)   Wt (!) 366 lb (166 kg)   BMI 54.05 kg/m²     1. Have you been to the ER, urgent care clinic since your last visit? Hospitalized since your last visit? No    2. Have you seen or consulted any other health care providers outside of the [address] [place] [address] since your last visit? Include any pap smears or colon screening.  No

## 2022-06-01 NOTE — TELEPHONE ENCOUNTER
I called the pharmacy yesterday and got the ozempic pushed through. Pt is aware and picking up today.

## 2022-06-01 NOTE — LETTER
6/1/2022    Patient: Lian James   YOB: 1975   Date of Visit: 6/1/2022     Carolann Onofre MD  [facility]  [address]  [place]    Dear Carolann Onofre MD,      Thank you for referring Ms. Lian James to [facility] for evaluation. My notes for this consultation are attached. If you have questions, please do not hesitate to call me. I look forward to following your patient along with you.       Sincerely,    Basil Pierson, DO

## 2022-06-01 NOTE — PROGRESS NOTES
After consent was obtained, phlebotomy performed by the medical assistant from the left superficial antecubital vein was drawn under sterile conditions. Once this was done under standard proprietary protocol, 6 cc of platelet rich plasma was then prepared under sterile conditions. Attention was then turned back to the patient, sterile preparation of the superolateral knee was then performed. I then injected the soft tissues and capsule with 1% lidocaine with epinephrine. Once adequate anesthesia was confirmed, I then utilized a 22-gauge needle to inject the platelet rich plasma, 6 cc into the joint itself. Needle was extracted. Sterile dressing was applied. Patient tolerated well. Postinjection instructions were given. She will hold off on anti-inflammatories for months. She deferred further pain medications. Follow-up in 1 month for clinical check.

## 2022-06-02 ENCOUNTER — TELEPHONE (OUTPATIENT)
Dept: ORTHOPEDIC SURGERY | Age: 47
End: 2022-06-02

## 2022-06-02 DIAGNOSIS — G89.29 CHRONIC PAIN OF RIGHT KNEE: Primary | ICD-10-CM

## 2022-06-02 DIAGNOSIS — M25.561 CHRONIC PAIN OF RIGHT KNEE: Primary | ICD-10-CM

## 2022-06-02 RX ORDER — HYDROCODONE BITARTRATE AND ACETAMINOPHEN 5; 325 MG/1; MG/1
1 TABLET ORAL
Qty: 21 TABLET | Refills: 0 | Status: SHIPPED
Start: 2022-06-02 | End: 2022-06-03

## 2022-06-02 NOTE — TELEPHONE ENCOUNTER
Pt came in today to state that her RT knee that she received the PRP in yesterday 6/1/22 is extremely painful. She wants to know how long this pain will last?   She also asked about a \"blood flow\" machine called [employer] that they use for her horse and have one for humans as well, she would like to know if she could use one as well or if it would be harmful to her in anyway?

## 2022-06-03 ENCOUNTER — VIRTUAL VISIT (OUTPATIENT)
Dept: FAMILY MEDICINE CLINIC | Age: 47
End: 2022-06-03
Payer: COMMERCIAL

## 2022-06-03 DIAGNOSIS — Z79.4 TYPE 2 DIABETES MELLITUS WITHOUT COMPLICATION, WITH LONG-TERM CURRENT USE OF INSULIN (HCC): Primary | ICD-10-CM

## 2022-06-03 DIAGNOSIS — N30.00 ACUTE CYSTITIS WITHOUT HEMATURIA: ICD-10-CM

## 2022-06-03 DIAGNOSIS — E11.9 TYPE 2 DIABETES MELLITUS WITHOUT COMPLICATION, WITH LONG-TERM CURRENT USE OF INSULIN (HCC): Primary | ICD-10-CM

## 2022-06-03 PROCEDURE — 99443 PR PHYS/QHP TELEPHONE EVALUATION 21-30 MIN: CPT | Performed by: FAMILY MEDICINE

## 2022-06-03 RX ORDER — CEPHALEXIN 500 MG/1
500 CAPSULE ORAL 2 TIMES DAILY
Qty: 6 CAPSULE | Refills: 0 | Status: SHIPPED | OUTPATIENT
Start: 2022-06-03 | End: 2022-06-06

## 2022-06-03 NOTE — PROGRESS NOTES
Chief Complaint   Patient presents with    Medication Evaluation     Patient states that she has noticed some decrease in glucose readings. 1. Have you been to the ER, urgent care clinic since your last visit? Hospitalized since your last visit? No    2. Have you seen or consulted any other health care providers outside of the [address] [place] [address] since your last visit? Include any pap smears or colon screening. No    .

## 2022-06-03 NOTE — PROGRESS NOTES
Belkys Hernandez is a 41 y.o. female evaluated via telephone on 6/3/2022. Consent:  She and/or health care decision maker is aware that she may receive a bill for this telephone service, depending on her insurance coverage, and has provided verbal consent to proceed: Yes      Documentation:  I communicated with the patient and/or health care decision maker about her diabetes medication and UTI. Carmen Rose Details of this discussion including any medical advice provided:       Subjective:   Belkys Hernandez was seen for Medication Evaluation (Patient states that she has noticed some decrease in glucose readings.)      Patient presents with:  Medication Evaluation: Patient states that she has noticed some decrease in glucose readings. She has not had Ozempic for 3 weeks. Evidently, she has been getting the run around about whether it is covered. When she was on Ozempic, her blood sugars had greatly improved. She is also watching her diet. Evidently, she just got a message from [employer] that her medication was ready for , but she is not sure what it is. I sent in a prescriptions for Ozempic yesterday. Also, she has been having urinary frequency and thinks she may have another UTI. Review of Systems   Constitutional: Negative for chills, fever and malaise/fatigue. Gastrointestinal: Negative for abdominal pain. Genitourinary: Positive for frequency. Negative for dysuria, flank pain, hematuria and urgency. Occasional bladder spasms   Musculoskeletal: Positive for back pain. Objective:   /72, P 90      Assessment & Plan:   Diagnoses and all orders for this visit:    1. Type 2 diabetes mellitus without complication, with long-term current use of insulin ([address]    2. Acute cystitis without hematuria  -     cephALEXin (Keflex) 500 mg capsule; Take 1 Capsule by mouth two (2) times a day for 3 days.         Had been doing well on Ozempic, hopefully that is what she will be picking up  Likely UTI, possibly related to uncontrolled diabetes  She will call me with the name of what she picks up  Will empirically treat with Keflex    Follow-up and Dispositions    · Return if not better in 3 days. Reviewed plan of care. Patient has provided input and agrees with goals. I affirm this is a Patient Initiated Episode with an Established Patient who has not had a related appointment within my department in the past 7 days or scheduled within the next 24 hours.     Total Time: minutes: 21-30 minutes    Note: not billable if this call serves to triage the patient into an appointment for the relevant concern      Elissa Garner MD

## 2022-06-09 ENCOUNTER — TELEPHONE (OUTPATIENT)
Dept: FAMILY MEDICINE CLINIC | Age: 47
End: 2022-06-09

## 2022-06-10 NOTE — TELEPHONE ENCOUNTER
[address] [facility]. Was advised with pt's insurance pt will need to have maintenance RX's filled at a [facility] or though [employer] order. Called and spoke with pt, and she has been advised and states understanding of this. Asked if she would like us to send RX to [facility], she declined. Pt states she will call the office Monday and advise which [address] [place] [address] she would like her medication sent to.
Please call her plan and find out what is covered. It says Trulicity is covered but is not preferred. She has been getting the run around.
Pt called office. She advised Dr. Martinez Kaufman wanted her to call the office back if unable to obtain diabetic medication. Pt states he has been unable to do so. [employer] and was advised both RX's require a PA. **No covered medications given. Ozempic PA- Information regarding your request  This request cannot be processed due to the medication is not covered by the plan. Trulicity PA- Information regarding your request  This request cannot be processed due to the medication is not covered by the plan.
Alert and oriented, no focal deficits, no motor or sensory deficits.

## 2022-06-20 DIAGNOSIS — E11.9 TYPE 2 DIABETES MELLITUS WITHOUT COMPLICATION, WITHOUT LONG-TERM CURRENT USE OF INSULIN (HCC): ICD-10-CM

## 2022-06-22 RX ORDER — GLIPIZIDE 10 MG/1
TABLET, FILM COATED, EXTENDED RELEASE ORAL
Qty: 90 TABLET | Refills: 0 | Status: SHIPPED | OUTPATIENT
Start: 2022-06-22 | End: 2022-11-01 | Stop reason: SDUPTHER

## 2022-06-27 RX ORDER — FLASH GLUCOSE SENSOR
KIT MISCELLANEOUS
Qty: 2 KIT | Refills: 2 | Status: SHIPPED | OUTPATIENT
Start: 2022-06-27 | End: 2022-09-14 | Stop reason: SDUPTHER

## 2022-07-27 NOTE — PROGRESS NOTES
"Oncology Rooming Note    July 27, 2022 2:47 PM   Vickie Alvarado is a 69 year old female who presents for:    Chief Complaint   Patient presents with     Blood Draw     Labs drawn via  by RN in lab.  VS taken     Oncology Clinic Visit     Rtn for breast cancer     Initial Vitals: /79   Pulse 83   Temp 97.6  F (36.4  C) (Oral)   Resp 16   Wt 90.1 kg (198 lb 9.6 oz)   LMP 05/17/2011   SpO2 96%   BMI 32.11 kg/m   Estimated body mass index is 32.11 kg/m  as calculated from the following:    Height as of 6/14/22: 1.675 m (5' 5.95\").    Weight as of this encounter: 90.1 kg (198 lb 9.6 oz). Body surface area is 2.05 meters squared.  No Pain (0) Comment: Data Unavailable   Patient's last menstrual period was 05/17/2011.  Allergies reviewed: Yes  Medications reviewed: Yes    Medications: Medication refills not needed today.  Pharmacy name entered into [employer] [facility]: [id number] - [place] [address] & [facility]    Clinical concerns: Pt has questions to address with provider. PA was notified.      Mounika Liao, EMT            " Nic Whitehead is a 42 y.o. female who was seen by synchronous (real-time) audio-video technology on 2/3/2022. Assessment & Plan:   Diagnoses and all orders for this visit:    1. Type 2 diabetes mellitus without complication, with long-term current use of insulin (HCC)  -     Ozempic 0.25 mg or 0.5 mg/dose (2 mg/1.5 ml) subq pen; 0.5 mg by SubCUTAneous route every seven (7) days. Sunday  -     METABOLIC PANEL, BASIC; Future  -     HEMOGLOBIN A1C WITH EAG; Future  -     MICROALBUMIN, UR, RAND W/ MICROALB/CREAT RATIO; Future    2. Hypercholesterolemia  -     LIPID PANEL; Future  -     HEPATIC FUNCTION PANEL; Future    3. Severe recurrent major depression without psychotic features (HCC)  -     citalopram (CeleXA) 20 mg tablet; Take 1 Tablet by mouth daily. 4. Anxiety  -     citalopram (CeleXA) 20 mg tablet; Take 1 Tablet by mouth daily. 5. Dysuria  -     URINALYSIS W/ RFLX MICROSCOPIC; Future  -     CULTURE, URINE; Future        Diabetes improving  Depression doing well, anxiety is not  Labs per orders. Restart Celexa  Refills per orders    Follow-up and Dispositions    · Return in about 3 weeks (around 2/24/2022) for anxiety, depression. Reviewed plan of care. Patient has provided input and agrees with goals. CPT Codes 43667-41005 for Established Patients may apply to this Telehealth Visit      Subjective:   Nic Whitehead was seen for Diabetes (Follow up. Would like labs ordered.), Medication Refill, and Anxiety      Patient presents with:  Diabetes: Follow up. Would like labs ordered. Medication Refill  Anxiety    She met with our pharm D and she was switched to Ozempic, but has only been on the 0.5 mg for one week. Also, she is taking Humalog, 10 to 20 units before breakfast lunch and dinner. Her blood sugars had been 60 to 180 until she got COVID-19 last week. Also, she needs to to follow up on her depression and anxiety.   She is worse and she is understaffed at work.  Evidently, she can't handle the call volume and this causes extreme anxiety. Also, she can't unwind at night and has almost started drinking alcohol. She has been on Celexa in the past which helped. She is having bladder spasms and wants to make sure she does not have another UTI again. Review of Systems   Constitutional: Positive for malaise/fatigue and weight loss. No weight gain   Eyes: Negative for blurred vision. Respiratory: Negative for shortness of breath. Cardiovascular: Positive for chest pain and palpitations. She has anxiety associated chest pain. This was evaluated at her last admission. Genitourinary:        Polyuria   Neurological: Positive for headaches. Negative for dizziness, sensory change, speech change and focal weakness. Endo/Heme/Allergies: Negative for polydipsia. Psychiatric/Behavioral: Negative for depression, substance abuse and suicidal ideas. The patient is nervous/anxious and has insomnia. Objective:   /80, P 78    Physical Exam  Constitutional:       General: She is not in acute distress. Appearance: Normal appearance. Neurological:      Mental Status: She is alert and oriented to person, place, and time. Motor: No tremor. Psychiatric:         Mood and Affect: Mood normal.         Behavior: Behavior normal.         Thought Content: Thought content normal.         Judgment: Judgment normal.      Comments: Appears stressed         Due to this being a TeleHealth evaluation, many elements of the physical examination are unable to be assessed. We discussed the expected course, resolution and complications of the diagnosis(es) in detail. Medication risks, benefits, costs, interactions, and alternatives were discussed as indicated. I advised her to contact the office if her condition worsens, changes or fails to improve as anticipated. She expressed understanding with the diagnosis(es) and plan. Pursuant to the emergency declaration under the [facility], [facility] waiver authority and the [employer] Act, this Virtual  Visit was conducted, with patient's consent, to reduce the patient's risk of exposure to COVID-19 and provide continuity of care for an established patient. Services were provided through a video synchronous discussion virtually to substitute for in-person clinic visit.     Manuela Woo MD

## 2022-08-08 RX ORDER — INSULIN LISPRO 100 [IU]/ML
INJECTION, SOLUTION INTRAVENOUS; SUBCUTANEOUS
Qty: 9 ML | Refills: 0 | Status: SHIPPED | OUTPATIENT
Start: 2022-08-08 | End: 2022-09-14 | Stop reason: SDUPTHER

## 2022-08-08 RX ORDER — INSULIN GLARGINE 100 [IU]/ML
INJECTION, SOLUTION SUBCUTANEOUS
Qty: 12 ML | Refills: 0 | Status: SHIPPED | OUTPATIENT
Start: 2022-08-08 | End: 2022-09-14

## 2022-08-16 ENCOUNTER — OFFICE VISIT (OUTPATIENT)
Dept: PRIMARY CARE CLINIC | Age: 47
End: 2022-08-16
Payer: COMMERCIAL

## 2022-08-16 VITALS
HEART RATE: 96 BPM | SYSTOLIC BLOOD PRESSURE: 149 MMHG | WEIGHT: 293 LBS | DIASTOLIC BLOOD PRESSURE: 83 MMHG | OXYGEN SATURATION: 97 % | BODY MASS INDEX: 43.4 KG/M2 | TEMPERATURE: 98.9 F | RESPIRATION RATE: 16 BRPM | HEIGHT: 69 IN

## 2022-08-16 DIAGNOSIS — R05.9 COUGH: Primary | ICD-10-CM

## 2022-08-16 DIAGNOSIS — J06.9 UPPER RESPIRATORY TRACT INFECTION, UNSPECIFIED TYPE: ICD-10-CM

## 2022-08-16 DIAGNOSIS — R49.1 LOSS OF VOICE: ICD-10-CM

## 2022-08-16 DIAGNOSIS — R22.1 SENSATION OF LUMP IN THROAT: ICD-10-CM

## 2022-08-16 RX ORDER — DULAGLUTIDE 0.75 MG/.5ML
INJECTION, SOLUTION SUBCUTANEOUS
COMMUNITY
Start: 2022-08-06 | End: 2022-09-14

## 2022-08-16 RX ORDER — PREDNISONE 10 MG/1
10 TABLET ORAL SEE ADMIN INSTRUCTIONS
Qty: 21 TABLET | Refills: 0 | Status: SHIPPED | OUTPATIENT
Start: 2022-08-16 | End: 2022-09-14

## 2022-08-16 RX ORDER — AZITHROMYCIN 250 MG/1
250 TABLET, FILM COATED ORAL SEE ADMIN INSTRUCTIONS
Qty: 6 TABLET | Refills: 0 | Status: SHIPPED | OUTPATIENT
Start: 2022-08-16 | End: 2022-08-21
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
## 2022-09-14 ENCOUNTER — OFFICE VISIT (OUTPATIENT)
Dept: ENDOCRINOLOGY | Age: 47
End: 2022-09-14
Payer: COMMERCIAL

## 2022-09-14 VITALS
HEIGHT: 69 IN | DIASTOLIC BLOOD PRESSURE: 88 MMHG | HEART RATE: 80 BPM | WEIGHT: 293 LBS | BODY MASS INDEX: 43.4 KG/M2 | SYSTOLIC BLOOD PRESSURE: 146 MMHG

## 2022-09-14 DIAGNOSIS — Z79.4 TYPE 2 DIABETES MELLITUS WITH HYPERGLYCEMIA, WITH LONG-TERM CURRENT USE OF INSULIN (HCC): Primary | ICD-10-CM

## 2022-09-14 DIAGNOSIS — E11.65 TYPE 2 DIABETES MELLITUS WITH HYPERGLYCEMIA, WITH LONG-TERM CURRENT USE OF INSULIN (HCC): Primary | ICD-10-CM

## 2022-09-14 LAB
GLUCOSE POC: 130 MG/DL
HBA1C MFR BLD HPLC: 9.7 %

## 2022-09-14 PROCEDURE — 99204 OFFICE O/P NEW MOD 45 MIN: CPT | Performed by: GENERAL ACUTE CARE HOSPITAL

## 2022-09-14 PROCEDURE — 82962 GLUCOSE BLOOD TEST: CPT | Performed by: GENERAL ACUTE CARE HOSPITAL

## 2022-09-14 PROCEDURE — 83036 HEMOGLOBIN GLYCOSYLATED A1C: CPT | Performed by: GENERAL ACUTE CARE HOSPITAL

## 2022-09-14 PROCEDURE — 3046F HEMOGLOBIN A1C LEVEL >9.0%: CPT | Performed by: GENERAL ACUTE CARE HOSPITAL

## 2022-09-14 RX ORDER — SEMAGLUTIDE 1.34 MG/ML
1 INJECTION, SOLUTION SUBCUTANEOUS
Qty: 1 EACH | Refills: 3 | Status: SHIPPED | OUTPATIENT
Start: 2022-09-14

## 2022-09-14 RX ORDER — INSULIN LISPRO 100 [IU]/ML
INJECTION, SOLUTION INTRAVENOUS; SUBCUTANEOUS
Qty: 15 ML | Refills: 3 | Status: SHIPPED | OUTPATIENT
Start: 2022-09-14

## 2022-09-14 RX ORDER — FLASH GLUCOSE SENSOR
KIT MISCELLANEOUS
Qty: 2 KIT | Refills: 12 | Status: SHIPPED | OUTPATIENT
Start: 2022-09-14

## 2022-09-14 RX ORDER — INSULIN GLARGINE 100 [IU]/ML
INJECTION, SOLUTION SUBCUTANEOUS
Qty: 15 ML | Refills: 0 | Status: SHIPPED | OUTPATIENT
Start: 2022-09-14 | End: 2022-11-04

## 2022-09-14 RX ORDER — ATORVASTATIN CALCIUM 10 MG/1
10 TABLET, FILM COATED ORAL DAILY
Qty: 90 TABLET | Refills: 2 | Status: SHIPPED | OUTPATIENT
Start: 2022-09-14 | End: 2022-10-13

## 2022-09-14 RX ORDER — PEN NEEDLE, DIABETIC 30 GX3/16"
NEEDLE, DISPOSABLE MISCELLANEOUS
Qty: 100 EACH | Refills: 4 | Status: SHIPPED | OUTPATIENT
Start: 2022-09-14
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
## 2022-09-14 NOTE — PATIENT INSTRUCTIONS
Diabetes Education referral made: Call them for Appt  [facility] for [address] [facility], [address] [address]  Phone [phone]  Fax [phone]     Plan to repeat your diabetes eye exam in the near future. Please be sure to have the eye clinic / office fax us the report of your latest eye exam to our clinic to fax # [phone]. This is very important for us to keep track of any effect of diabetes on your vision as part of our wholesome diabetes care that we provide. INFORMATION FOR NEW DIABETES PATIENTS ON INSULIN:    I would like to welcome you to our Diabetes & Endocrinology clinic. We want to do our best to help you take the best care of your diabetes. I would like for you to read this fact sheet which will have some important information for you regarding your treatment with insulin. What is my HbA1c (hemoglobin A1c) ? Blood sugar is very sticky and if left elevated for long enough, it will stick to just about everything in your body. This includes enzymes which can no longer function properly and the lining of your blood vessels which can get damaged and result in damaged organs. It also sticks to your hemoglobin when your red blood cells are being produced and measuring this can provide us with a good idea of what your blood sugar average has been over the past 3 months. Most of the time we aim for a HbA1c value of 7% but this can be different for certain people under certain circumstances. What kinds of insulin are usually used ? Many years ago the types of insulin available were limited but now there are several different options to use. The important thing to know is that there are SHORT acting insulins which are used to treat the glucose elevation from your meals, and there are LONG acting insulins which are used as a basal or background insulin that provide a background sugar control throughout the day and night.  You may be only on a LONG acting insulin, or you may be on a combination of LONG and SHORT acting insulins. It is important that during and following each visit you have a good understanding of your own personal regimen. In some instances there are pre-mixed insulins in which a short and intermediate acting insulin are combined in one vial or pen. Why do I need to keep a glucose log ? It is not possible to properly make changes to your insulin dose unless we know what your glucose values are at home. Using your HbA1c we can only have a general idea of whether your glucose has been controlled or uncontrolled, but it will not inform us on your day-to-day and kjyk-er-uaae blood sugar control. If you present to clinic without your glucose log, you may be wasting your visit rather than having a more meaningful visit since medication adjustments will be limited. What do I do if I have persistently HIGH or LOW glucose at home between my visits ? It is not necessary to wait until your next appointment before making any changes in your insulin dose if you are having persistent high or low blood sugar at home. As discussed in clinic today, we would like to aim for a fasting glucose goal/target of 130-150 in the AM and also at bedtime, while avoiding any hypoglycemia (low glucose level). For persistent hyperglycemia (Highs) related to meals, defined as being above your glucose target, increase your mealtime insulin by 2 units every 3-4 days as appropriate until the target is achieved. For persistent mild hypoglycemia (Lows), decrease the dose instead. For persistent hyperglycemia not related to meals, as can be interpreted by your fasting AM glucose, increase your once daily long acting insulin by 2 units every 3-4 days until the target fasting glucose has been achieved. For persistent mild hypoglycemia, decrease the dose instead.      If your blood glucose is persistently low, if you are having any related symptoms, or if you are just not certain of how to adjust your insulin, please notify your doctor for advise on dose adjustment. How to treat low blood glucose ? 1. Consume 15-20 grams of glucose or simple carbohydrates. 2. Recheck your blood glucose after 15 minutes. 3. If hypoglycemia continues, repeat. 4. Once blood glucose returns to normal, eat a small snack if your next planned meal or snack is more than an hour or two away. What is a Sliding Scale ? Generally a sliding scale is just a set of instructions for how much insulin to take for a specific glucose range. This generally is not often used anymore because a sliding scale does not adequately treat your meal as it is intended. HOWEVER we do use a version of the sliding scale, known as a CORRECTION SCALE, and this is used IN ADDITION to your scheduled mealtime insulin to account for a blood glucose which is already high before eating the meal. Typically it will be a set of instructions which advise you of how much more insulin to ADD to your mealtime insulin dose if your glucose is already above goal. Not everyone has a correction scale, however you may be advised of one in the future. What other things should I do to always be prepared ? Glucose tablets. Thats right, you need to be prepared just in case you get low blood sugar which can cause you to have very uncomfortable symptoms. Generally it is a good idea to keep some in your car, in your bedroom, and at work/school just in case. Diabetes ID. It is important for others to know that you are diabetic and on insulin in case for some reason you are not able to communicate with others. This can happen if you pass out due to severely low blood sugar for example. IDs come as bracelets, necklaces, and dog tags. Check with your pharmacy about obtaining an ID and wear it wherever you go.     I look forward to working with you,    Arabella Fonseca MD   [address] [place] [address] System    . ............................................................................................................................................ PLAN FOR TODAY    We will plan to make the following changes to your diabetes medications:  Lantus 50 units daily  Humalog as below  Glipizide 10mg twice daily (take 30 mins before breakfast and dinner)  Take Ozempic 1mg weekly dose and if tolerated well please let me know so we will increase the dose to 2mg weekly    Start atorvastatin 10 mg daily    Humalog 10 units + Correction scale    Correction Scale:   1 unit for every 40 above 150    IF GLUCOSE IS:                 THEN TAKE:      0   Extra Unit  151-190   1   Extra Unit  191-230   2   Extra Units  231-270   3   Extra Units  271-310   4   Extra Units  311-350   5   Extra Units  >350    6   Extra Units    Example: My planned insulin dose:    ____ Units    +    ____ Extra Correction Units  =  ____ total units to take together as one injection. It will be important to continue checking your glucose just as you did previously. I would like you at the very least to check you glucose during:   + AM fasting before breakfast   + Dinner time   + Bedtime  And any other time that you are not feeling well. Always provide a glucose log that is completed at every visit so that we can review the results of your home glucose together. Without this, it is not possible to make accurate changes to your insulin doses. Please consider checking with your pharmacy about obtaining a diabetes medical alert ID, if you have not already. This can come in the form of a bracelet or \"dog tags\". It is important for others to know that you are diabetic on insulin, especially if you become ill and are unable to speak. Diabetes and Meal Planning    Meal planning can be a key part of managing diabetes.  Planning meals and snacks with the right balance of carbohydrate, protein, and fat can help you keep your blood sugar at the target level. You don't have to eat special foods. You can eat what your family eats, including sweets once in a while. But you do have to pay attention to how often you eat and how much you eat of certain foods. Your plate  The plate format is a simple way to help you manage how you eat. You plan meals by learning how much space each food should take on a plate. It can make it easier to keep your blood sugar level within your target range. It also helps you see if you're eating healthy portion sizes. To use the plate format, you put non-starchy vegetables on half your plate. Add lean protein foods, such as fish, lean meats and poultry, or soy products, on one-quarter of the plate. Put a grain or starchy vegetable (such as brown rice or a potato) on the final quarter of the plate. You can add a small piece of fruit and some low-fat or fat-free milk or yogurt, depending on your carbohydrate goal for each meal.  Make sure that you are not using an oversized plate. A 9-inch plate is best.    Carbohydrates  Carbohydrate raises blood sugar higher and more quickly than any other nutrient. It is found in desserts, breads and cereals, and fruit. It's also found in starchy vegetables such as potatoes and corn, grains such as rice and pasta, and milk and yogurt. You can help keep your blood sugar levels within your target range by planning how much carbohydrate to have at meals and snacks. The amount you need depends on several things. These include your weight, how active you are, which diabetes medicines you take, and what your goals are for your blood sugar levels. An example of a carbohydrate counting plan is:  45 to 60 grams at each meal. That's about the same as 3 to 4 carbohydrate servings. 15 to 20 grams at each snack. That's about the same as 1 carbohydrate serving. The Nutrition Facts label on packaged foods tells you how much carbohydrate is in a serving of the food.  First, look at the serving size on the food label. All of the nutrition information on a food label is based on that serving size. For foods that don't come with labels, such as fresh fruits and vegetables, you'll need a guide that lists carbohydrate in these foods. You may use an phuong on your smart phone called Flashpoint. How can you plan healthy meals? Here are some tips to get started:  Plan your meals a week at a time. Don't forget to include snacks too. Use cookbooks or online recipes to plan several main meals. Plan some quick meals for busy nights. You also can double some recipes that freeze well. Then you can save half for other busy nights when you don't have time to cook. Make sure you have the ingredients you need for your recipes. If you're running low on basic items, put these items on your shopping list too. List foods that you use to make breakfasts, lunches, and snacks. List plenty of fruits and vegetables. Post this list on the refrigerator. Add to it as you think of more things you need. Take the list to the store to do your weekly shopping. Follow-up care is a key part of your treatment and safety. Be sure to make and go to all appointments, and call your doctor if you are having problems. It's also a good idea to know your test results and keep a list of the medicines you take.    --------------------------------------------------------------------------------------------------------------------------------------------------------------------------------  Diabetes Dental Care  When you have diabetes, managing blood sugar levels and taking good care of your teeth and gums are both important. When blood sugar levels are high, there's a greater risk for Gum (periodontal) disease. Tooth decay. Fungal infections in the mouth, like thrush. Dry mouth. Keeping your blood sugar levels in your target range can help prevent problems with the teeth and gums.  If you have any problems with your teeth or gums, it is important see your dentist.  How do you care for your teeth and gums when you have diabetes? Brush your teeth twice a day. Floss daily. Make sure to press the floss against your teeth and not your gums. Check each day for areas where your gums might be red or painful. Be sure to let your dentist know of any sores in your mouth. See your dentist regularly for professional cleaning of your teeth and to look for gum problems. Many dentists recommend getting checkups twice a year. Remind your dentist that you have diabetes before any work is done. Don't smoke or use smokeless tobacco.    --------------------------------------------------------------------------------------------------------------------------------------------------------------------------------  Diabetes Foot Care    When you have diabetes, your feet need extra care and attention. Diabetes can damage the nerve endings and blood vessels in your feet, making you less likely to notice when your feet are injured. Diabetes also limits your body's ability to fight infection. If you get a minor foot injury, it could become an ulcer or a serious infection. With good foot care, you can prevent most of these problems. Caring for your feet can be quick and easy. Most of the care can be done when you are bathing or getting ready for bed. Keep your blood sugar close to normal by watching what and how much you eat, monitoring blood sugar, taking medicines if prescribed, and getting regular exercise. Do not smoke. Smoking affects blood flow and can make foot problems worse. Eat a diet that is low in fats. High fat intake can cause fat to build up in your blood vessels and decrease blood flow. Inspect your feet daily for blisters, cuts, cracks, or sores. If you cannot see well, use a mirror or have someone help you. Take care of your feet:  Wash your feet every day. Use warm (not hot) water.  Check the water temperature with your wrists or other part of your body, not your feet. Dry your feet well. If the skin on your feet stays moist, bacteria or a fungus can grow, which can lead to infection. Use moisturizing skin cream to keep the skin on your feet soft and prevent calluses and cracks. Stop using any cream that causes a rash. Clean underneath your toenails carefully. Do not use a sharp object to clean underneath your toenails. Change socks daily. Look inside your shoes every day for things like gravel or torn linings, which could cause blisters or sores. Buy shoes that fit well but not too tightly to prevent bunions and blisters. Shoes should be flexible and breathable but prevent from injury. Do not go barefoot, especially at night, to prevent injury. Do not try to treat an early foot problem at home. Home remedies or treatments that you can buy without a prescription (such as corn removers) can be harmful. Seek immediate help if:   You have a foot sore, an ulcer or break in the skin that is not healing after 4 days, bleeding corns or calluses, or an ingrown toenail. You have blue or black areas. You have peeling skin or tiny blisters between your toes or cracking or oozing of the skin. You have a fever for more than 24 hours and a foot sore. You have new numbness or tingling in your feet that does not go away after you move your feet or change positions. You have new unexplained or unusual swelling of the foot or ankle.

## 2022-09-14 NOTE — LETTER
9/14/2022    Patient: Alexus Castillo   YOB: 1975   Date of Visit: 9/14/2022     Otto Murdock, [address]  [place] [facility] [address]    Dear Otto Murdock MD,      Thank you for referring Ms. Manjula Boss to [facility] DIABETES [facility] ENDOCRINOLOGY for evaluation. My notes for this consultation are attached. If you have questions, please do not hesitate to call me. I look forward to following your patient along with you.       Sincerely,    Jason Watson MD

## 2022-10-13 ENCOUNTER — OFFICE VISIT (OUTPATIENT)
Dept: ENDOCRINOLOGY | Age: 47
End: 2022-10-13
Payer: COMMERCIAL

## 2022-10-13 VITALS
HEIGHT: 69 IN | HEART RATE: 78 BPM | WEIGHT: 293 LBS | SYSTOLIC BLOOD PRESSURE: 131 MMHG | BODY MASS INDEX: 43.4 KG/M2 | DIASTOLIC BLOOD PRESSURE: 80 MMHG

## 2022-10-13 DIAGNOSIS — E78.5 DYSLIPIDEMIA: ICD-10-CM

## 2022-10-13 DIAGNOSIS — E11.65 TYPE 2 DIABETES MELLITUS WITH HYPERGLYCEMIA, WITH LONG-TERM CURRENT USE OF INSULIN (HCC): Primary | ICD-10-CM

## 2022-10-13 DIAGNOSIS — Z79.4 TYPE 2 DIABETES MELLITUS WITH HYPERGLYCEMIA, WITH LONG-TERM CURRENT USE OF INSULIN (HCC): Primary | ICD-10-CM

## 2022-10-13 DIAGNOSIS — E66.01 CLASS 3 SEVERE OBESITY DUE TO EXCESS CALORIES WITH SERIOUS COMORBIDITY AND BODY MASS INDEX (BMI) OF 50.0 TO 59.9 IN ADULT (HCC): ICD-10-CM

## 2022-10-13 PROCEDURE — 3046F HEMOGLOBIN A1C LEVEL >9.0%: CPT | Performed by: GENERAL ACUTE CARE HOSPITAL

## 2022-10-13 PROCEDURE — 99214 OFFICE O/P EST MOD 30 MIN: CPT | Performed by: GENERAL ACUTE CARE HOSPITAL

## 2022-10-13 NOTE — PATIENT INSTRUCTIONS
Diabetes Education referral made: Call them for Appt  [facility] for [address] [facility], [address] [address]  Phone [phone]  Fax [phone]     Plan to repeat your diabetes eye exam in the near future. Please be sure to have the eye clinic / office fax us the report of your latest eye exam to our clinic to fax # [phone]. This is very important for us to keep track of any effect of diabetes on your vision as part of our wholesome diabetes care that we provide. What do I do if I have persistently HIGH or LOW glucose at home between my visits ? It is not necessary to wait until your next appointment before making any changes in your insulin dose if you are having persistent high or low blood sugar at home. As discussed in clinic today, we would like to aim for a fasting glucose goal/target of 130-150 in the AM and also at bedtime, while avoiding any hypoglycemia (low glucose level). For persistent hyperglycemia (Highs) related to meals, defined as being above your glucose target, increase your mealtime insulin by 2 units every 3-4 days as appropriate until the target is achieved. For persistent mild hypoglycemia (Lows), decrease the dose instead. For persistent hyperglycemia not related to meals, as can be interpreted by your fasting AM glucose, increase your once daily long acting insulin by 2 units every 3-4 days until the target fasting glucose has been achieved. For persistent mild hypoglycemia, decrease the dose instead. If your blood glucose is persistently low, if you are having any related symptoms, or if you are just not certain of how to adjust your insulin, please notify your doctor for advise on dose adjustment. How to treat low blood glucose ? 1. Consume 15-20 grams of glucose or simple carbohydrates. 2. Recheck your blood glucose after 15 minutes. 3. If hypoglycemia continues, repeat.     4. Once blood glucose returns to normal, eat a small snack if your next planned meal or snack is more than an hour or two away. What is a Sliding Scale ? Generally a sliding scale is just a set of instructions for how much insulin to take for a specific glucose range. This generally is not often used anymore because a sliding scale does not adequately treat your meal as it is intended. HOWEVER we do use a version of the sliding scale, known as a CORRECTION SCALE, and this is used IN ADDITION to your scheduled mealtime insulin to account for a blood glucose which is already high before eating the meal. Typically it will be a set of instructions which advise you of how much more insulin to ADD to your mealtime insulin dose if your glucose is already above goal. Not everyone has a correction scale, however you may be advised of one in the future. ............................................................................................................................................. PLAN FOR TODAY    We will plan to make the following changes to your diabetes medications:  Lantus 50 units daily  Humalog as below  Glipizide 10mg twice daily (take 30 mins before breakfast and dinner)  Take Ozempic 1mg weekly dose     Humalog 10 units + Correction scale  Correction Scale:   1 unit for every 40 above 150    IF GLUCOSE IS:                 THEN TAKE:      0   Extra Unit  151-190   1   Extra Unit  191-230   2   Extra Units  231-270   3   Extra Units  271-310   4   Extra Units  311-350   5   Extra Units  >350   6   Extra Units    Example: My planned insulin dose:    ____ Units    +    ____ Extra Correction Units  =  ____ total units to take together as one injection. Obtain fasting lipid panels     It will be important to continue checking your glucose just as you did previously.    I would like you at the very least to check you glucose during:   + AM fasting before breakfast   + Dinner time   + Bedtime  And any other time that you are not feeling well. Always provide a glucose log that is completed at every visit so that we can review the results of your home glucose together. Without this, it is not possible to make accurate changes to your insulin doses. Please consider checking with your pharmacy about obtaining a diabetes medical alert ID, if you have not already. This can come in the form of a bracelet or \"dog tags\". It is important for others to know that you are diabetic on insulin, especially if you become ill and are unable to speak.

## 2022-10-13 NOTE — LETTER
10/13/2022    Patient: Kim Medina   YOB: 1975   Date of Visit: 10/13/2022     Brandt Mathur, 20 [address] [facility] [address]    Dear Brandt Mathur MD,      Thank you for referring Ms. Manjula Boss to [facility] DIABETES [facility]INOLOGY for evaluation. My notes for this consultation are attached. If you have questions, please do not hesitate to call me. I look forward to following your patient along with you.       Sincerely,    Merline Barrow, MD
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
## 2022-11-01 DIAGNOSIS — E11.9 TYPE 2 DIABETES MELLITUS WITHOUT COMPLICATION, WITHOUT LONG-TERM CURRENT USE OF INSULIN (HCC): ICD-10-CM

## 2022-11-01 RX ORDER — GLIPIZIDE 10 MG/1
20 TABLET, FILM COATED, EXTENDED RELEASE ORAL DAILY
Qty: 60 TABLET | Refills: 3 | Status: SHIPPED | OUTPATIENT
Start: 2022-11-01

## 2022-11-01 NOTE — TELEPHONE ENCOUNTER
----- Message from [facility] sent at 11/1/2022 12:34 PM EDT -----  Regarding: Ordering glipizide   You talked about me taking the pill 2 x day and I'm about to run out from my pervious doctor's prescription. Can you put in a new order for 2 x day. O positive

## 2022-11-03 DIAGNOSIS — E11.65 TYPE 2 DIABETES MELLITUS WITH HYPERGLYCEMIA, WITH LONG-TERM CURRENT USE OF INSULIN (HCC): ICD-10-CM

## 2022-11-03 DIAGNOSIS — Z79.4 TYPE 2 DIABETES MELLITUS WITH HYPERGLYCEMIA, WITH LONG-TERM CURRENT USE OF INSULIN (HCC): ICD-10-CM

## 2022-11-04 RX ORDER — INSULIN GLARGINE 100 [IU]/ML
INJECTION, SOLUTION SUBCUTANEOUS
Qty: 30 ML | Refills: 5 | Status: SHIPPED | OUTPATIENT
Start: 2022-11-04

## 2022-11-08 ENCOUNTER — PATIENT MESSAGE (OUTPATIENT)
Dept: ENDOCRINOLOGY | Age: 47
End: 2022-11-08

## 2022-11-22 ENCOUNTER — OFFICE VISIT (OUTPATIENT)
Dept: PRIMARY CARE CLINIC | Age: 47
End: 2022-11-22
Payer: COMMERCIAL

## 2022-11-22 VITALS
SYSTOLIC BLOOD PRESSURE: 137 MMHG | TEMPERATURE: 97.8 F | DIASTOLIC BLOOD PRESSURE: 82 MMHG | BODY MASS INDEX: 43.4 KG/M2 | RESPIRATION RATE: 16 BRPM | WEIGHT: 293 LBS | OXYGEN SATURATION: 96 % | HEIGHT: 69 IN | HEART RATE: 88 BPM

## 2022-11-22 DIAGNOSIS — N12 PYELONEPHRITIS: Primary | ICD-10-CM

## 2022-11-22 DIAGNOSIS — R30.0 DYSURIA: ICD-10-CM

## 2022-11-22 LAB
BILIRUB UR QL STRIP: NEGATIVE
GLUCOSE UR-MCNC: NEGATIVE MG/DL
KETONES P FAST UR STRIP-MCNC: NEGATIVE MG/DL
PH UR STRIP: 5.5 [PH] (ref 4.6–8)
PROT UR QL STRIP: NORMAL
SP GR UR STRIP: 1.03 (ref 1–1.03)
UA UROBILINOGEN AMB POC: NORMAL (ref 0.2–1)
URINALYSIS CLARITY POC: CLEAR
URINALYSIS COLOR POC: YELLOW
URINE BLOOD POC: NORMAL
URINE LEUKOCYTES POC: NORMAL
URINE NITRITES POC: POSITIVE

## 2022-11-22 PROCEDURE — 99214 OFFICE O/P EST MOD 30 MIN: CPT | Performed by: NURSE PRACTITIONER

## 2022-11-22 PROCEDURE — 81002 URINALYSIS NONAUTO W/O SCOPE: CPT | Performed by: NURSE PRACTITIONER

## 2022-11-22 RX ORDER — CEPHALEXIN 250 MG/1
250 CAPSULE ORAL 3 TIMES DAILY
Qty: 30 CAPSULE | Refills: 0 | Status: SHIPPED | OUTPATIENT
Start: 2022-11-22 | End: 2022-12-02
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
## 2022-11-22 NOTE — PROGRESS NOTES
Visit Vitals  /82   Pulse 88   Temp 97.8 °F (36.6 °C) (Temporal)   Resp 16   Ht 5' 9\" (1.753 m)   Wt (!) 358 lb 9.6 oz (162.7 kg)   LMP 11/11/2022   SpO2 96%   BMI 52.96 kg/m²     Chief Complaint   Patient presents with    Urinary Frequency     Urinary frequency/pain/spasms off and on X 3 weeks. H/O Pyonephritis 1.5 years ago.

## 2022-11-25 LAB
BACTERIA SPEC CULT: ABNORMAL
CC UR VC: ABNORMAL
SERVICE CMNT-IMP: ABNORMAL

## 2022-11-30 DIAGNOSIS — N30.00 ACUTE CYSTITIS WITHOUT HEMATURIA: ICD-10-CM

## 2022-12-01 RX ORDER — CEPHALEXIN 500 MG/1
CAPSULE ORAL
Qty: 6 CAPSULE | Refills: 0 | OUTPATIENT
Start: 2022-12-01

## 2022-12-02 ENCOUNTER — TELEPHONE (OUTPATIENT)
Dept: FAMILY MEDICINE CLINIC | Age: 47
End: 2022-12-02

## 2022-12-23 ENCOUNTER — OFFICE VISIT (OUTPATIENT)
Dept: ORTHOPEDIC SURGERY | Age: 47
End: 2022-12-23
Payer: COMMERCIAL

## 2022-12-23 VITALS
HEIGHT: 69 IN | OXYGEN SATURATION: 98 % | DIASTOLIC BLOOD PRESSURE: 82 MMHG | RESPIRATION RATE: 18 BRPM | HEART RATE: 88 BPM | TEMPERATURE: 97.1 F | BODY MASS INDEX: 43.4 KG/M2 | WEIGHT: 293 LBS | SYSTOLIC BLOOD PRESSURE: 122 MMHG

## 2022-12-23 DIAGNOSIS — E11.9 TYPE 2 DIABETES MELLITUS WITHOUT COMPLICATION, WITH LONG-TERM CURRENT USE OF INSULIN (HCC): ICD-10-CM

## 2022-12-23 DIAGNOSIS — Z79.4 TYPE 2 DIABETES MELLITUS WITHOUT COMPLICATION, WITH LONG-TERM CURRENT USE OF INSULIN (HCC): ICD-10-CM

## 2022-12-23 DIAGNOSIS — S83.231D COMPLEX TEAR OF MEDIAL MENISCUS OF RIGHT KNEE, SUBSEQUENT ENCOUNTER: Primary | ICD-10-CM

## 2022-12-23 NOTE — PROGRESS NOTES
Rm    Chief Complaint   Patient presents with    Follow Up Appointment     Right knee        Visit Vitals  /82 (BP 1 Location: Right upper arm, BP Patient Position: Sitting, BP Cuff Size: Large adult)   Pulse 88   Temp 97.1 °F (36.2 °C) (Temporal)   Resp 18   Ht 5' 9\" (1.753 m)   Wt (!) 358 lb (162.4 kg)   SpO2 98%   BMI 52.87 kg/m²        1. Have you been to the ER, urgent care clinic since your last visit? Hospitalized since your last visit? No    2. Have you seen or consulted any other health care providers outside of the [address] [place] [address] since your last visit? Include any pap smears or colon screening. No     Health Maintenance Due   Topic Date Due    COVID-19 Vaccine (1) Never done    Pneumococcal 0-64 years (1 - PCV) Never done    Hepatitis B Vaccine (1 of 3 - Risk 3-dose series) Never done    DTaP/Tdap/Td series (1 - Tdap) 06/09/2011    Colorectal Cancer Screening Combo  Never done    Flu Vaccine (1) 08/01/2022    A1C test (Diabetic or Prediabetic)  12/14/2022        3 most recent PHQ Screens 12/23/2022   PHQ Not Done -   Little interest or pleasure in doing things Not at all   Feeling down, depressed, irritable, or hopeless Not at all   Total Score PHQ 2 0   Trouble falling or staying asleep, or sleeping too much -   Feeling tired or having little energy -   Poor appetite, weight loss, or overeating -   Feeling bad about yourself - or that you are a failure or have let yourself or your family down -   Trouble concentrating on things such as school, work, reading, or watching TV -   Moving or speaking so slowly that other people could have noticed; or the opposite being so fidgety that others notice -   Thoughts of being better off dead, or hurting yourself in some way -   PHQ 9 Score -   How difficult have these problems made it for you to do your work, take care of your home and get along with others -        No flowsheet data found.     Learning Assessment 10/9/2014   PRIMARY LEARNER Patient HIGHEST LEVEL OF EDUCATION - PRIMARY LEARNER  GRADUATED HIGH SCHOOL OR GED   BARRIERS PRIMARY LEARNER NONE   CO-LEARNER CAREGIVER No   PRIMARY LANGUAGE ENGLISH   LEARNER PREFERENCE PRIMARY DEMONSTRATION   ANSWERED BY patient   RELATIONSHIP SELF
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
## 2022-12-27 ENCOUNTER — TELEPHONE (OUTPATIENT)
Dept: ORTHOPEDIC SURGERY | Age: 47
End: 2022-12-27

## 2022-12-27 NOTE — TELEPHONE ENCOUNTER
Contacted patient to schedule surgery. Scheduled for 1/9/23. Advised patient that clearances from N/A would be required, and would need to be received no less than 2 business days before surgery. Patient advised to contact the office(s) to make pre op appts as soon as possible. Patient verbalized understanding and was encouraged to contact our office with any questions or concerns regarding upcoming surgery or required clearances. Clearance letters faxed to N/A.  Confirmation was received.         ----- Message from Penny Martinez DO sent at 12/23/2022  7:23 AM EST -----  Diagnosis: S83.231D  Procedure: Right knee arthroscopy, partial medial meniscectomy  CPT: 38477  Operative minutes: 60  Disposition postop: home  Location: [facility] Main OR  PAT: yes  Class: no  Special Equipment: knee arthroscopy, shaver, biter set  Staffing: Assistant/retractor gipson: no  Anesthesia: general

## 2022-12-29 RX ORDER — CHOLECALCIFEROL (VITAMIN D3) 125 MCG
CAPSULE ORAL AS NEEDED
COMMUNITY

## 2022-12-29 NOTE — PERIOP NOTES
[facility]  Preoperative Instructions        Surgery Date 01/09/23          Time of Arrival  01/06/23 between 2-5pm    1. On the day of your surgery, please report to the Surgical Services Registration Desk and sign in at your designated time. The Surgery Center is located to the right of the Emergency Room. 2. You must have someone with you to drive you home. You should not drive a car for 24 hours following surgery. Please make arrangements for a friend or family member to stay with you for the first 24 hours after your surgery. 3. Do not have anything to eat or drink (including water, gum, mints, coffee, juice) after midnight ?01/08/23? Indu Bloodgood ? This may not apply to medications prescribed by your physician. ?(Please note below the special instructions with medications to take the morning of your procedure.)    4. We recommend you do not drink any alcoholic beverages for 24 hours before and after your surgery. 5. Contact your surgeons office for instructions on the following medications: non-steroidal anti-inflammatory drugs (i.e. Advil, Aleve), vitamins, and supplements. (Some surgeons will want you to stop these medications prior to surgery and others may allow you to take them)  **If you are currently taking Plavix, Coumadin, Aspirin and/or other blood-thinning agents, contact your surgeon for instructions. ** Your surgeon will partner with the physician prescribing these medications to determine if it is safe to stop or if you need to continue taking. Please do not stop taking these medications without instructions from your surgeon    6. Wear comfortable clothes. Wear glasses instead of contacts. Do not bring any money or jewelry. Please bring picture ID, insurance card, and any prearranged co-payment or hospital payment. Do not wear make-up, particularly mascara the morning of your surgery. Do not wear nail polish, particularly if you are having foot /hand surgery. Wear your hair loose or down, no ponytails, buns, geeta pins or clips. All body piercings must be removed. Please shower with antibacterial soap for three consecutive days before and on the morning of surgery, but do not apply any lotions, powders or deodorants after the shower on the day of surgery. Please use a fresh towels after each shower. Please sleep in clean clothes and change bed linens the night before surgery. Please do not shave for 48 hours prior to surgery. Shaving of the face is acceptable. 7. You should understand that if you do not follow these instructions your surgery may be cancelled. If your physical condition changes (I.e. fever, cold or flu) please contact your surgeon as soon as possible. 8. It is important that you be on time. If a situation occurs where you may be late, please call [phone] (OR Holding Area). 9. If you have any questions and or problems, please call [phone] (Pre-admission Testing). 10. Your surgery time may be subject to change. You will receive a phone call the evening prior if your time changes. 11.  If having outpatient surgery, you must have someone to drive you here, stay with you during the duration of your stay, and to drive you home at time of discharge. TAKE ALL MEDICATIONS DAY OF SURGERY EXCEPT: oral diabetic meds, humalog insulin      I understand a pre-operative phone call will be made to verify my surgery time. In the event that I am not available, I give permission for a message to be left on my answering service and/or with another person?   Yes [phone]         ___________________      __________   _________    (Signature of Patient)             (Witness)                (Date and Time)

## 2023-01-05 ENCOUNTER — TELEPHONE (OUTPATIENT)
Dept: ORTHOPEDIC SURGERY | Age: 48
End: 2023-01-05

## 2023-01-06 ENCOUNTER — ANESTHESIA EVENT (OUTPATIENT)
Dept: SURGERY | Age: 48
End: 2023-01-06
Payer: COMMERCIAL

## 2023-01-06 NOTE — ANESTHESIA PREPROCEDURE EVALUATION
Anesthetic History   No history of anesthetic complications  PONV          Review of Systems / Medical History  Patient summary reviewed, nursing notes reviewed and pertinent labs reviewed    Pulmonary  Within defined limits                 Neuro/Psych         Psychiatric history     Cardiovascular  Within defined limits          Dysrhythmias : PVC           GI/Hepatic/Renal  Within defined limits   GERD           Endo/Other  Within defined limits      Morbid obesity     Other Findings              Physical Exam    Airway  Mallampati: II  TM Distance: 4 - 6 cm  Neck ROM: normal range of motion   Mouth opening: Normal     Cardiovascular    Rhythm: regular  Rate: normal         Dental  No notable dental hx       Pulmonary  Breath sounds clear to auscultation               Abdominal         Other Findings            Anesthetic Plan    ASA: 3  Anesthesia type: general          Induction: Intravenous  Anesthetic plan and risks discussed with: Patient

## 2023-01-08 ENCOUNTER — TELEPHONE (OUTPATIENT)
Dept: ENDOCRINOLOGY | Age: 48
End: 2023-01-08

## 2023-01-08 NOTE — TELEPHONE ENCOUNTER
Ashia, the cover my meds request for freestyle latasha 2 sensors and Ozempic says that patient is not eligible, can you please confirm what type of insurance Ms. Jenamagan Frias has?

## 2023-01-09 ENCOUNTER — HOSPITAL ENCOUNTER (OUTPATIENT)
Age: 48
Setting detail: OUTPATIENT SURGERY
Discharge: HOME | End: 2023-01-09
Attending: ORTHOPAEDIC SURGERY | Admitting: ORTHOPAEDIC SURGERY
Payer: COMMERCIAL

## 2023-01-09 ENCOUNTER — ANESTHESIA (OUTPATIENT)
Dept: SURGERY | Age: 48
End: 2023-01-09
Payer: COMMERCIAL

## 2023-01-09 VITALS
HEART RATE: 80 BPM | RESPIRATION RATE: 16 BRPM | WEIGHT: 293 LBS | DIASTOLIC BLOOD PRESSURE: 64 MMHG | HEIGHT: 69 IN | BODY MASS INDEX: 43.4 KG/M2 | OXYGEN SATURATION: 94 % | TEMPERATURE: 98.5 F | SYSTOLIC BLOOD PRESSURE: 119 MMHG

## 2023-01-09 DIAGNOSIS — S83.231D COMPLEX TEAR OF MEDIAL MENISCUS OF RIGHT KNEE, SUBSEQUENT ENCOUNTER: Primary | ICD-10-CM

## 2023-01-09 LAB
GLUCOSE BLD STRIP.AUTO-MCNC: 116 MG/DL (ref 65–117)
GLUCOSE BLD STRIP.AUTO-MCNC: 124 MG/DL (ref 65–117)
HCG UR QL: NEGATIVE
SERVICE CMNT-IMP: ABNORMAL
SERVICE CMNT-IMP: NORMAL

## 2023-01-09 PROCEDURE — 77030018834: Performed by: ORTHOPAEDIC SURGERY

## 2023-01-09 PROCEDURE — 29881 ARTHRS KNE SRG MNISECTMY M/L: CPT | Performed by: ORTHOPAEDIC SURGERY

## 2023-01-09 PROCEDURE — 76060000032 HC ANESTHESIA 0.5 TO 1 HR: Performed by: ORTHOPAEDIC SURGERY

## 2023-01-09 PROCEDURE — 74011250636 HC RX REV CODE- 250/636: Performed by: ORTHOPAEDIC SURGERY

## 2023-01-09 PROCEDURE — 77030008496 HC TBNG ARTHSC IRR S&N -B: Performed by: ORTHOPAEDIC SURGERY

## 2023-01-09 PROCEDURE — 81025 URINE PREGNANCY TEST: CPT

## 2023-01-09 PROCEDURE — 82962 GLUCOSE BLOOD TEST: CPT

## 2023-01-09 PROCEDURE — 2709999900 HC NON-CHARGEABLE SUPPLY: Performed by: ORTHOPAEDIC SURGERY

## 2023-01-09 PROCEDURE — 77030026438 HC STYL ET INTUB CARD -A: Performed by: ANESTHESIOLOGY

## 2023-01-09 PROCEDURE — 74011250636 HC RX REV CODE- 250/636: Performed by: ANESTHESIOLOGY

## 2023-01-09 PROCEDURE — 74011250637 HC RX REV CODE- 250/637: Performed by: ANESTHESIOLOGY

## 2023-01-09 PROCEDURE — 77030008684 HC TU ET CUF COVD -B: Performed by: ANESTHESIOLOGY

## 2023-01-09 PROCEDURE — 74011000250 HC RX REV CODE- 250: Performed by: ANESTHESIOLOGY

## 2023-01-09 PROCEDURE — 74011250637 HC RX REV CODE- 250/637: Performed by: ORTHOPAEDIC SURGERY

## 2023-01-09 PROCEDURE — 77030012711 HC WND ARTHRO ABLT S&N -D: Performed by: ORTHOPAEDIC SURGERY

## 2023-01-09 PROCEDURE — 77030002916 HC SUT ETHLN J&J -A: Performed by: ORTHOPAEDIC SURGERY

## 2023-01-09 PROCEDURE — 76210000016 HC OR PH I REC 1 TO 1.5 HR: Performed by: ORTHOPAEDIC SURGERY

## 2023-01-09 PROCEDURE — 77030000032 HC CUF TRNQT ZIMM -B: Performed by: ORTHOPAEDIC SURGERY

## 2023-01-09 PROCEDURE — 76210000021 HC REC RM PH II 0.5 TO 1 HR: Performed by: ORTHOPAEDIC SURGERY

## 2023-01-09 PROCEDURE — 76010000138 HC OR TIME 0.5 TO 1 HR: Performed by: ORTHOPAEDIC SURGERY

## 2023-01-09 RX ORDER — SCOLOPAMINE TRANSDERMAL SYSTEM 1 MG/1
1 PATCH, EXTENDED RELEASE TRANSDERMAL
Status: DISCONTINUED | OUTPATIENT
Start: 2023-01-09 | End: 2023-01-09 | Stop reason: HOSPADM

## 2023-01-09 RX ORDER — SUCCINYLCHOLINE CHLORIDE 20 MG/ML
INJECTION INTRAMUSCULAR; INTRAVENOUS AS NEEDED
Status: DISCONTINUED | OUTPATIENT
Start: 2023-01-09 | End: 2023-01-09 | Stop reason: HOSPADM

## 2023-01-09 RX ORDER — ROPIVACAINE HYDROCHLORIDE 5 MG/ML
INJECTION, SOLUTION EPIDURAL; INFILTRATION; PERINEURAL AS NEEDED
Status: DISCONTINUED | OUTPATIENT
Start: 2023-01-09 | End: 2023-01-09 | Stop reason: HOSPADM

## 2023-01-09 RX ORDER — KETOROLAC TROMETHAMINE 30 MG/ML
INJECTION, SOLUTION INTRAMUSCULAR; INTRAVENOUS AS NEEDED
Status: DISCONTINUED | OUTPATIENT
Start: 2023-01-09 | End: 2023-01-09 | Stop reason: HOSPADM

## 2023-01-09 RX ORDER — PROPOFOL 10 MG/ML
INJECTION, EMULSION INTRAVENOUS AS NEEDED
Status: DISCONTINUED | OUTPATIENT
Start: 2023-01-09 | End: 2023-01-09 | Stop reason: HOSPADM

## 2023-01-09 RX ORDER — ONDANSETRON 2 MG/ML
INJECTION INTRAMUSCULAR; INTRAVENOUS AS NEEDED
Status: DISCONTINUED | OUTPATIENT
Start: 2023-01-09 | End: 2023-01-09 | Stop reason: HOSPADM

## 2023-01-09 RX ORDER — LIDOCAINE HYDROCHLORIDE 20 MG/ML
INJECTION, SOLUTION EPIDURAL; INFILTRATION; INTRACAUDAL; PERINEURAL AS NEEDED
Status: DISCONTINUED | OUTPATIENT
Start: 2023-01-09 | End: 2023-01-09 | Stop reason: HOSPADM

## 2023-01-09 RX ORDER — FENTANYL CITRATE 50 UG/ML
INJECTION, SOLUTION INTRAMUSCULAR; INTRAVENOUS AS NEEDED
Status: DISCONTINUED | OUTPATIENT
Start: 2023-01-09 | End: 2023-01-09 | Stop reason: HOSPADM

## 2023-01-09 RX ORDER — ASPIRIN 325 MG
325 TABLET, DELAYED RELEASE (ENTERIC COATED) ORAL 2 TIMES DAILY
Qty: 60 TABLET | Refills: 0 | Status: SHIPPED | OUTPATIENT
Start: 2023-01-09

## 2023-01-09 RX ORDER — HYDROCODONE BITARTRATE AND ACETAMINOPHEN 5; 325 MG/1; MG/1
1 TABLET ORAL
Status: DISCONTINUED | OUTPATIENT
Start: 2023-01-09 | End: 2023-01-09 | Stop reason: HOSPADM

## 2023-01-09 RX ORDER — ACETAMINOPHEN 500 MG
1000 TABLET ORAL ONCE
Status: COMPLETED | OUTPATIENT
Start: 2023-01-09 | End: 2023-01-09

## 2023-01-09 RX ORDER — PHENYLEPHRINE HCL IN 0.9% NACL 0.4MG/10ML
SYRINGE (ML) INTRAVENOUS AS NEEDED
Status: DISCONTINUED | OUTPATIENT
Start: 2023-01-09 | End: 2023-01-09 | Stop reason: HOSPADM

## 2023-01-09 RX ORDER — HYDROCODONE BITARTRATE AND ACETAMINOPHEN 5; 325 MG/1; MG/1
1 TABLET ORAL
Qty: 18 TABLET | Refills: 0 | Status: SHIPPED | OUTPATIENT
Start: 2023-01-09 | End: 2023-01-12

## 2023-01-09 RX ORDER — SODIUM CHLORIDE, SODIUM LACTATE, POTASSIUM CHLORIDE, CALCIUM CHLORIDE 600; 310; 30; 20 MG/100ML; MG/100ML; MG/100ML; MG/100ML
25 INJECTION, SOLUTION INTRAVENOUS CONTINUOUS
Status: DISCONTINUED | OUTPATIENT
Start: 2023-01-09 | End: 2023-01-09 | Stop reason: HOSPADM

## 2023-01-09 RX ORDER — CELECOXIB 200 MG/1
200 CAPSULE ORAL ONCE
Status: COMPLETED | OUTPATIENT
Start: 2023-01-09 | End: 2023-01-09

## 2023-01-09 RX ADMIN — Medication 3 AMPULE: at 10:48

## 2023-01-09 RX ADMIN — FENTANYL CITRATE 75 MCG: 50 INJECTION, SOLUTION INTRAMUSCULAR; INTRAVENOUS at 11:34

## 2023-01-09 RX ADMIN — SCOPALAMINE 1 PATCH: 1 PATCH, EXTENDED RELEASE TRANSDERMAL at 11:23

## 2023-01-09 RX ADMIN — LIDOCAINE HYDROCHLORIDE 100 MG: 20 INJECTION, SOLUTION EPIDURAL; INFILTRATION; INTRACAUDAL; PERINEURAL at 11:34

## 2023-01-09 RX ADMIN — Medication 80 MCG: at 12:01

## 2023-01-09 RX ADMIN — SUCCINYLCHOLINE CHLORIDE 200 MG: 20 INJECTION, SOLUTION INTRAMUSCULAR; INTRAVENOUS at 11:34

## 2023-01-09 RX ADMIN — Medication 3 G: at 11:39

## 2023-01-09 RX ADMIN — PROPOFOL 200 MG: 10 INJECTION, EMULSION INTRAVENOUS at 11:34

## 2023-01-09 RX ADMIN — Medication 80 MCG: at 11:58

## 2023-01-09 RX ADMIN — ONDANSETRON HYDROCHLORIDE 4 MG: 2 INJECTION, SOLUTION INTRAMUSCULAR; INTRAVENOUS at 11:41

## 2023-01-09 RX ADMIN — HYDROCODONE BITARTRATE AND ACETAMINOPHEN 1 TABLET: 5; 325 TABLET ORAL at 13:17

## 2023-01-09 RX ADMIN — KETOROLAC TROMETHAMINE 30 MG: 30 INJECTION, SOLUTION INTRAMUSCULAR; INTRAVENOUS at 12:06

## 2023-01-09 RX ADMIN — CELECOXIB 200 MG: 200 CAPSULE ORAL at 10:46

## 2023-01-09 RX ADMIN — Medication 1000 MG: at 10:46

## 2023-01-09 RX ADMIN — SODIUM CHLORIDE, POTASSIUM CHLORIDE, SODIUM LACTATE AND CALCIUM CHLORIDE 25 ML/HR: 600; 310; 30; 20 INJECTION, SOLUTION INTRAVENOUS at 11:05

## 2023-01-09 RX ADMIN — PROPOFOL 75 MCG/KG/MIN: 10 INJECTION, EMULSION INTRAVENOUS at 11:37

## 2023-01-09 RX ADMIN — Medication 80 MCG: at 11:48

## 2023-01-09 NOTE — DISCHARGE SUMMARY
Date of Admission: 1/9/2023  Date of Discharge: 1/9/2023  Attending on admission and discharge: Dr. Tamica Erickson    Admitting Diagnosis: Right knee medial meniscal tear  Discharge Diagnosis: same  Procedure Performed: Right knee arthroscopy, partial medial meniscectomy    Hospital Course and Treatment:     The patient was admitted through the operating room for a same day surgery. The patient tolerated the procedure well and was taken to the recovery floor for further observation and treatment. The patient was maintained on IV fluids until tolerating a PO diet. They were also maintained on sequential compression devices and DVT prophylaxis. The diet was advanced as tolerated. The patient was mobilized  without issue. There were no complications during the course of the hospital stay, and the patient was deemed medically stable for discharge to home. Discharge instructions: The patient should not be in a pool or tub, or otherwise immerse the wound in water. The patient has been counseled on the importance of mobilizing and moving at least every hour to help prevent blood clots. The patient should call Dr. Mahogany Pimentel office or go to an emergency department if they note a fever over 101.1 degrees fahrenheit, more or unrelieved pain, more or new swelling at the operative site, or any drainage from the wound. Condition at Discharge: Stable    Discharge medications:  Resume regular home medications  Additional medications to be prescribed on discharge  Norco  aspirin      Follow up instructions: The patient should follow up in 2 weeks for a wound check and xrays with Dr. Debbie Monaco.
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
## 2023-01-09 NOTE — ANESTHESIA POSTPROCEDURE EVALUATION
Procedure(s):  RIGHT KNEE ARTHROSCOPY, PARTIAL MEDIAL MENISCECTOMY. general    Anesthesia Post Evaluation      Multimodal analgesia: multimodal analgesia used between 6 hours prior to anesthesia start to PACU discharge  Patient location during evaluation: PACU  Patient participation: complete - patient participated  Level of consciousness: sleepy but conscious and responsive to verbal stimuli  Pain score: 2  Pain management: adequate  Airway patency: patent  Anesthetic complications: no  Cardiovascular status: acceptable  Respiratory status: acceptable  Hydration status: acceptable  Comments: +Post-Anesthesia Evaluation and Assessment    Patient: Ifrah Esparza MRN: [id number]  SSN: [id number]   YOB: 1975  Age: 41 y.o. Sex: female      Cardiovascular Function/Vital Signs    /66   Pulse 85   Temp 37.1 °C (98.7 °F)   Resp 17   Ht 5' 9\" (1.753 m)   Wt (!) 163 kg (359 lb 5.6 oz)   SpO2 93%   BMI 53.07 kg/m²     Patient is status post Procedure(s):  RIGHT KNEE ARTHROSCOPY, PARTIAL MEDIAL MENISCECTOMY. Nausea/Vomiting: Controlled. Postoperative hydration reviewed and adequate. Pain:  Pain Scale 1: Numeric (0 - 10) (01/09/23 1310)  Pain Intensity 1: 5 (01/09/23 1310)   Managed. Neurological Status:   Neuro (WDL): Within Defined Limits (01/09/23 1028)   At baseline. Mental Status and Level of Consciousness: Arousable. Pulmonary Status:   O2 Device: None (Room air) (01/09/23 1310)   Adequate oxygenation and airway patent. Complications related to anesthesia: None    Post-anesthesia assessment completed. No concerns.     Signed By: Gissel Dobson MD    1/9/2023  Post anesthesia nausea and vomiting:  controlled  Final Post Anesthesia Temperature Assessment:  Normothermia (36.0-37.5 degrees C)      INITIAL Post-op Vital signs:   Vitals Value Taken Time   /66 01/09/23 1315   Temp 37.1 °C (98.7 °F) 01/09/23 1310   Pulse 85 01/09/23 1319   Resp 21 01/09/23 1319   SpO2 94 % 01/09/23 1319   Vitals shown include unvalidated device data.

## 2023-01-09 NOTE — PERIOP NOTES
TRANSFER - OUT REPORT:    Verbal report given to Kari RN (name) on Eduardo Cespedes  being transferred to phase 2 (unit) for routine post - op       Report consisted of patients Situation, Background, Assessment and   Recommendations(SBAR). Information from the following report(s) SBAR, OR Summary, Procedure Summary, Intake/Output, MAR, Recent Results, and Cardiac Rhythm NSR   was reviewed with the receiving nurse. Opportunity for questions and clarification was provided.       Patient transported with:   Registered Nurse

## 2023-01-09 NOTE — OP NOTES
Date of Procedure: 1/9/2023    Preoperative Diagnosis: Degenerative, Complex Medial Meniscal Tear, Right Knee  Postoperative Diagnosis: Degenerative, Complex Medial Meniscal Tear, Right Knee  Procedure Performed: Arthroscopically assisted partial Medial Meniscectomy, Right Knee  Surgeon: Fidel Galeas D.O. Assistant: mike  Anesthesia: General, LMA  Estimated Blood loss: 5 cc  Complications: None  Drains: None  Specimens: None  Implants: None    Indications: This is a(n) 41 y.o. female who did have knee pain and associated mechanical symptoms which were associated with a meniscal tear. The patient did fail nonoperative measures and requested surgical intervention. We did discuss the risks of surgery which include, but are not limited to, infection, nerve or blood vessel damage, death, disability, progression of arthritis, persistent knee pain, intra or postoperative fracture, ligament injury, stiffness, chondral injury, DVT, PE, wound healing issues, organ dysfunction. The patient did freely consent for surgery. Description of Procedure in Detail:     After the correct site and side were identified by myself in the holding area, the patient was transported to the surgical suite. Then, after successful induction of anesthesia, the right leg was prepped and draped in the usual sterile orthopaedic fashion. After an appropriate time out, and confirmation that antibiotics had been infused within one hour of incision time, the leg was exsanguinated with an Esmarch bandage, and tourniquet taken to 250 mmHg. A standard anterolateral portal was created with a stab incision lateral to the patellar tendon and inferior to the patella. A blunt trochar was then inserted into the suprapatellar pouch and camera inserted. The joint was infused with saline and with that, a standard anteromedial portal was created under direct visualization, spinal needle insertion, and blunt trochar insertion.   A diagnostic inspection of the joint followed. Positive findings were a degenerative medial meniscal tear, chronic synovitis, grade III changes femoral trochlea. Otherwise, the suprapatellar pouch, medial and lateral gutters, patellar cartilage, trochlear cartilage, medial and lateral femoral condyles, medial and lateral tibial plateaus, ACL, and remainder of the menisci were unremarkable. After joint inspection, a combination of biters and ochoa were used to trim the medial meniscal tear back to a stable edge. Care was taken to take only what was necessary to ensure relief of the mechanical symptoms, and leave as much healthy tissue as possible. Once the tear was excised, the meniscus was reinspected and found to be satisfactory. With that, final pictures were taken, final inspection was satisfactory. Tourniquet was then released. The camera was returned to the suprapatellar pouch, excess saline was removed. The soft tissues were infused with 0.5% ropivicaine and the joint infused with 5mg of Duramorph. The skin was closed with 3-0 nylon sutures. A sterile dressing and ice were applied. The patient was then successfully awoken and taken to PACU in stable condition without apparent complication. Postoperative plan: The patient will be weight bearing as tolerated without restriction. They will participate in physical therapy for range of motion and decreased swelling. They will have ecotrin for DVT prophylaxis, Percocet for pain management, and colace for bowel maintenance. They will be seen at 2 weeks then six weeks in clinic for the standard postoperative protocol.

## 2023-01-09 NOTE — DISCHARGE INSTRUCTIONS
Knee Arthroscopy: If you have specific questions: CALL OUR OFFICE: [phone]    DRESSING:  Keep dressing on, clean, and dry for three days. Three days after your surgery, you may remove the dressing and wash leg with soap and water. You are not allowed to place this underwater, and once it is cleaned, you should put an occlusive dressing over the wound. ACTIVITY:  Keep the operative knee elevated as much as possible for the next two weeks until your swelling diminishes. You have been provided crutches for safety. While you will likely encounter some discomfort, the main structure of your knee is unchanged, and you can bear weight to your tolerance on this leg without restriction. You should not operate heavy machinery, drive, do heavy lifting, squatting, or kneeling until otherwise instructed. Once you are confident and safe off of crutches, you may begin to walk with a cane or no device, but this should be only when you are safe to walk and confident with the strength of your leg. It is important to begin gentle range of motion activities to your tolerance as soon as possible. GENERAL PAIN CONTROL:    IF YOU HAVE HAD A NERVE BLOCK: remember, you will have an increase in pain sometime in the first 24 hours after your surgery. This can be significant, make sure you are consistently medicating and expect that increase in pain. This is normal, but will require you to be proactive in your pain control. If this increase in pain persists - call Dr. Miranda Corners office. Take pain medications as needed and prescribed. Never exceed the maximum dosage. It is reasonable to take something for pain prior to night time on the first night to avoid severe pain in the night. Ice is an important part of your recovery, and best if you do not apply ice or ice pack directly to skin, but use a towel or cloth on your skin.   Do this for twenty minutes on the skin, then at least twenty minutes off of your skin. General Postoperative Instructions: If you have specific questions: CALL OUR OFFICE: [phone]    Diet: It is OK to resume your regular diet postoperatively. Start with light liquids and then slowly increase what you eat to avoid postoperative nausea and vomiting. If you do experience nausea, restrict fatty or greasy foods until this passes. If you have continued issues, please call our office number for help: [phone]. Dressings: In general, dressings should remain dry and clean. In many cases, these should stay on until the postoperative visit, unless you've been directed to do so otherwise. Additionally, if the dressing becomes soiled or bloody, please call our office for further instructions. Ice instructions:  Ice is important for pain relief, but can be dangerous if improperly applied. Always make sure that NO ice has direct contact with the skin, and make sure that it never stays on the affected area more than 20 minutes. It is important to have ice off of the area for more than 20 minutes after a session to allow the skin to normalize temperature. It is ok to ice through casts, splints, and dressings, and can still be helpful. Weight bearing: Please refer to the specific surgical weight bearing instructions. Please do not exceed the weight limits as prescribed, as this could cause a surgical complication, wound healing problem, or other issue that could otherwise be avoided. Warning signs: The patient should call Dr. Devan Mazariegos office or go to an emergency department if they note a fever over 101.1 degrees fahrenheit, more or unrelieved pain, more or new swelling at the operative site, discoloration of the extremity any drainage from the wound.       DISCHARGE SUMMARY from Nurse    PATIENT INSTRUCTIONS:    After general anesthesia or intravenous sedation, for 24 hours or while taking prescription narcotics:    Have someone responsible help you with your care  Limit your activities  Do not drive and operate hazardous machinery  Do not make important personal, legal or business decisions  Do not drink alcoholic beverages  If you have not urinated within 8 hours after discharge, please contact your surgeon on call  Resume your medications unless otherwise instructed    From general anesthesia, intravenous sedation, or while taking prescription narcotics, you may experience:    Drowsiness, dizziness, sleepiness, or confusion  Difficulty remembering or delayed reaction times  Difficulty with your balance, especially while walking, move slowly and carefully, do not make sudden position changes  Difficulty focusing or blurred vision    You may not be aware of slight changes in your behavior and/or your reaction time because of the medication used during and after your procedure. Report the following to your surgeon:  Excessive pain, swelling, redness or odor of or around the surgical area  Temperature over 100.5  Nausea and vomiting lasting longer than 4 hours or if unable to take medications  Any signs of decreased circulation or nerve impairment to extremity: change in color, persistent numbness, tingling, coldness or increase pain  Any questions or concerns         IF YOU REPORT TO AN EMERGENCY ROOM, DOCTOR'S OFFICE OR HOSPITAL WITHIN 24 HOURS AFTER YOUR PROCEDURE, BRING THIS SHEET AND YOUR AFTER VISIT SUMMARY WITH YOU AND GIVE IT TO THE PHYSICIAN OR NURSE ATTENDING YOU. These are general instructions for a healthy lifestyle (if applicable): No smoking/ No tobacco products/ Avoid exposure to secondhand smoke  Surgeon General's Warning:  Quitting smoking now greatly reduces serious risk to your health.     Obesity, smoking, and sedentary lifestyle greatly increases your risk for illness    A healthy diet, regular physical exercise & weight monitoring are important for maintaining a healthy lifestyle    You may be retaining fluid if you have a history of heart failure or if you experience any of the following symptoms:  Weight gain of 3 pounds or more overnight or 5 pounds in a week, increased swelling in our hands or feet or shortness of breath while lying flat in bed. Please call your doctor as soon as you notice any of these symptoms; do not wait until your next office visit. A common side effect of anesthesia following surgery is nausea and/or vomiting. In order to decrease symptoms, it is wise to avoid foods that are high in fat, greasy foods, milk products, and spicy foods for the first 24 hours. Acceptable foods for the first 24 hours following surgery include but are not limited to:    soup  broth  toast   crackers   applesauce  bananas   mashed potatoes,  soft or scrambled eggs  oatmeal  jello    It is important to eat when taking your pain medication. This will help to prevent nausea. If possible, please try to time your meals with your medications. It is very important to stay hydrated following surgery. Sip fluids frequently while awake. Avoid acidic drinks such as citrus juices and soda for 24 hours. Carbonated beverages may cause bloating and gas. Acceptable fluids include:    water (flavor packets may add variety)  coffee or tea (in moderation)  Gatorade  Sundar-Aid  apple juice  cranberry juice    You are encouraged to cough and deep breathe every hour when awake. This will help to prevent respiratory complications following anesthesia. You may want to hug a pillow when coughing and sneezing to add additional support to the surgical area and to decrease discomfort if you had abdominal or chest surgery. If you are discharged home with support stockings, you may remove them after 24 hours. Support stockings are used to help prevent blood clots in the legs following surgery. TO PREVENT AN INFECTION      [address] YOUR HANDS    To prevent infection, good handwashing is the most important thing you or your caregiver can do.       Wash your hands with soap and water or use the hand  we gave you before you touch any wounds. SHOWER    Use the antibacterial soap we gave you when you take a shower. Shower with this soap until your wounds are healed. To reach all areas of your body, you may need someone to help you. Dont forget to clean your belly button with every shower. USE CLEAN SHEETS    Use freshly cleaned sheets on your bed after surgery. To keep the surgery site clean, do not allow pets to sleep with you while your wound is still healing. STOP SMOKING    Stop smoking, or at least cut back on smoking    Smoking slows your healing. CONTROL YOUR BLOOD SUGAR    High blood sugars slow wound healing. If you are diabetic, control your blood sugar levels before and after your surgery. Please take time to review all of your Home Care Instructions and Medication Information sheets provided in your discharge packet. If you have any questions, please contact your surgeon's office. Thank you. The discharge information has been reviewed with the patient and instruction recipient. The patient and instruction recipient verbalized understanding. Discharge medications reviewed with the patient and instruction recipient and appropriate educational materials and side effects teaching were provided. Please provide this summary of care documentation to your next provider.

## 2023-01-09 NOTE — TELEPHONE ENCOUNTER
I spoke with patient and she confirmed that her insurance is active and correct. She will be calling them regarding the pharmacy vs the dme supply company for the [place].

## 2023-01-09 NOTE — PERIOP NOTES
Handoff Report from Operating Room to PACU    Report received from [facility]  and Dr. Nancy Ortega regarding Salena Almaguer. Surgeon(s):  Aracelis Friedman DO  And Procedure(s) (LRB):  RIGHT KNEE ARTHROSCOPY, PARTIAL MEDIAL MENISCECTOMY (Right)  confirmed   with allergies and dressings discussed. Anesthesia type, drugs, patient history, complications, estimated blood loss, vital signs, intake and output, and last pain medication, lines, and reversal medications were reviewed.

## 2023-01-09 NOTE — PERIOP NOTES
1028 - PT DENIES FEVER, COLD, COUGH, SOB, N/V, DIARRHEA. .. PRE-OP TCHING DONE - PT VERBALIZES UNDERSTANDING. STRETCHER IN LOWEST POSITION, CB IN PLACE AND SR UP X2.

## 2023-01-10 NOTE — TELEPHONE ENCOUNTER
PA initiated through covermymeds for [employer] and sent to Dr Sharon Navarro for completion.      PA for [facility] was submitted and pending approval.

## 2023-01-20 ENCOUNTER — VIRTUAL VISIT (OUTPATIENT)
Dept: FAMILY MEDICINE CLINIC | Age: 48
End: 2023-01-20
Payer: COMMERCIAL

## 2023-01-20 DIAGNOSIS — F33.9 RECURRENT MAJOR DEPRESSIVE DISORDER, REMISSION STATUS UNSPECIFIED (HCC): Primary | ICD-10-CM

## 2023-01-20 DIAGNOSIS — F41.9 ANXIETY: ICD-10-CM

## 2023-01-20 DIAGNOSIS — R45.851 SUICIDAL IDEATION: ICD-10-CM

## 2023-01-20 PROCEDURE — 99214 OFFICE O/P EST MOD 30 MIN: CPT | Performed by: FAMILY MEDICINE

## 2023-01-20 RX ORDER — SERTRALINE HYDROCHLORIDE 50 MG/1
50 TABLET, FILM COATED ORAL DAILY
Qty: 30 TABLET | Refills: 0 | Status: SHIPPED | OUTPATIENT
Start: 2023-01-20

## 2023-01-20 RX ORDER — HYDROCODONE BITARTRATE AND ACETAMINOPHEN 5; 325 MG/1; MG/1
1 TABLET ORAL
COMMUNITY

## 2023-01-20 NOTE — PROGRESS NOTES
Chief Complaint   Patient presents with    Depression     Would like to discuss restarting medication. Anxiety     Follow up. 1. Have you been to the ER, urgent care clinic since your last visit? Hospitalized since your last visit? Yes, 01/09/23 [facility] Knee surgery. 2. Have you seen or consulted any other health care providers outside of the [address] [place] [address] since your last visit? Include any pap smears or colon screening.  No

## 2023-01-20 NOTE — PROGRESS NOTES
Zaid Liang is a 41 y.o. female who was seen by synchronous (real-time) audio-video technology on 1/20/2023. Assessment & Plan:   Diagnoses and all orders for this visit:    1. Recurrent major depressive disorder, remission status unspecified (HCC)  -     sertraline (Zoloft) 50 mg tablet; Take 1 Tablet by mouth daily. 2. Suicidal ideation    3. Anxiety  -     sertraline (Zoloft) 50 mg tablet; Take 1 Tablet by mouth daily. Severe depression and anxiety  No longer suicidal since stopping work  Jaree out of work until follow up    Follow-up and Dispositions    Return in about 3 weeks (around 2/10/2023) for depression, anxiety. Reviewed plan of care. Patient has provided input and agrees with goals. CPT Codes 03332-76890 for Established Patients may apply to this Telehealth Visit      Subjective:   Zaid Liang was seen for Depression (Would like to discuss restarting medication.) and Anxiety (Follow up.)      Patient presents with:  Depression: Would like to discuss restarting medication. Anxiety: Follow up. She has been worse for the past 4-5 months. Symptoms include fatigue, falling asleep at work, decreased appetite, severe road rage, irritability and nausea. She had knee surgery on the 9th and has not been at work since. Her symptoms have improved. Review of Systems   Constitutional:  Positive for malaise/fatigue and weight loss. No weight gain   Respiratory:  Negative for shortness of breath. Cardiovascular:  Positive for palpitations. Negative for chest pain. Psychiatric/Behavioral:  Positive for depression and suicidal ideas. Negative for hallucinations and substance abuse. The patient is nervous/anxious. The patient does not have insomnia. 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 No further suicidal thoughts. Since she stopped working on the 6th, she has had no suicidal thoughts.   She has the suicide hot line number in her phone and has it \"everywhere\". Objective:     Physical Exam  Neurological:      Mental Status: She is alert. Psychiatric:         Mood and Affect: Affect is tearful. Due to this being a TeleHealth evaluation, many elements of the physical examination are unable to be assessed. We discussed the expected course, resolution and complications of the diagnosis(es) in detail. Medication risks, benefits, costs, interactions, and alternatives were discussed as indicated. I advised her to contact the office if her condition worsens, changes or fails to improve as anticipated. She expressed understanding with the diagnosis(es) and plan. Pursuant to the emergency declaration under the [facility], [facility] waiver authority and the [employer] and [employer] Act, this Virtual  Visit was conducted, with patient's consent, to reduce the patient's risk of exposure to COVID-19 and provide continuity of care for an established patient. Services were provided through a video synchronous discussion virtually to substitute for in-person clinic visit.     Ebonie Doyle MD

## 2023-01-25 ENCOUNTER — OFFICE VISIT (OUTPATIENT)
Dept: ORTHOPEDIC SURGERY | Age: 48
End: 2023-01-25
Payer: COMMERCIAL

## 2023-01-25 ENCOUNTER — TELEPHONE (OUTPATIENT)
Dept: ENDOCRINOLOGY | Age: 48
End: 2023-01-25

## 2023-01-25 VITALS
OXYGEN SATURATION: 96 % | TEMPERATURE: 97.2 F | WEIGHT: 293 LBS | HEIGHT: 69 IN | HEART RATE: 83 BPM | RESPIRATION RATE: 18 BRPM | SYSTOLIC BLOOD PRESSURE: 133 MMHG | DIASTOLIC BLOOD PRESSURE: 86 MMHG | BODY MASS INDEX: 43.4 KG/M2

## 2023-01-25 DIAGNOSIS — S83.231D COMPLEX TEAR OF MEDIAL MENISCUS OF RIGHT KNEE, SUBSEQUENT ENCOUNTER: Primary | ICD-10-CM

## 2023-01-25 DIAGNOSIS — Z47.89 ORTHOPEDIC AFTERCARE: ICD-10-CM

## 2023-01-25 PROCEDURE — 99024 POSTOP FOLLOW-UP VISIT: CPT | Performed by: ORTHOPAEDIC SURGERY

## 2023-01-25 NOTE — PROGRESS NOTES
Identified pt with two pt identifiers (name and ). Reviewed chart in preparation for visit and have obtained necessary documentation. Rosalia Vallejo is a 41 y.o. female  Chief Complaint   Patient presents with    Post OP Follow Up     2 wk Post Op right knee scope     Visit Vitals  /86 (BP 1 Location: Right arm, BP Patient Position: Sitting, BP Cuff Size: Large adult)   Pulse 83   Temp 97.2 °F (36.2 °C) (Temporal)   Resp 18   Ht 5' 9\" (1.753 m)   Wt (!) 357 lb (161.9 kg)   SpO2 96%   BMI 52.72 kg/m²       1. Have you been to the ER, urgent care clinic since your last visit? Hospitalized since your last visit? No    2. Have you seen or consulted any other health care providers outside of the [address] [place] [address] since your last visit? Include any pap smears or colon screening.  No

## 2023-01-25 NOTE — PROGRESS NOTES
is here for a follow up visit from a right knee arthroscopy, partial medial meniscectomy. Pain has been appropriate since surgery, no major medical complications since surgery. Current Outpatient Medications on File Prior to Visit   Medication Sig Dispense Refill    sertraline (Zoloft) 50 mg tablet Take 1 Tablet by mouth daily. 30 Tablet 0    aspirin delayed-release 325 mg tablet Take 1 Tablet by mouth two (2) times a day. 60 Tablet 0    Lantus Solostar U-100 Insulin 100 unit/mL (3 mL) inpn INJECT 50 UNITS UNDER THE SKIN DAILY AT BEDTIME 30 mL 5    glipiZIDE SR (GLUCOTROL XL) 10 mg CR tablet Take 2 Tablets by mouth daily. 60 Tablet 3    Insulin Needles, Disposable, (BD Ultra-Fine Short Pen Needle) 31 gauge x 5/16\" ndle USE ONE PEN NEEDLE EVERY 7 DAYS OR AS DIRECTED E11.65 100 Each 4    FreeStyle Dominik 2 Sensor kit E11.65   Use 2 sensors per month with reader (Patient taking differently: E11.65   Use 2 sensors per month with reader) 2 Kit 12    medroxyPROGESTERone (PROVERA) 10 mg tablet Take 1 Tablet by mouth daily. Take 10 days/month. 30 Tablet 4    Blood-Glucose Meter monitoring kit Please take readings 4 times a day, before breakfast, lunch, dinner and at bedtime 1 Kit 0    glucose blood VI test strips (Glucocom Glucose) strip TID AC, QHs 100 Strip 0    lancets-blood glucose strips 30 gauge cmpk 1 Package by Does Not Apply route Before breakfast, lunch, dinner and at bedtime. 1 Dose Pack 0    HYDROcodone-acetaminophen (NORCO) 5-325 mg per tablet Take 1 Tablet by mouth every eight (8) hours as needed for Pain. (Patient not taking: Reported on 1/25/2023)      naproxen sodium 220 mg cap Take  by mouth as needed. (Patient not taking: Reported on 1/25/2023)      semaglutide (Ozempic) 1 mg/dose (4 mg/3 mL) pnij 1 mg by SubCUTAneous route every seven (7) days.  (Patient not taking: Reported on 1/25/2023) 1 Each 3    insulin lispro (HUMALOG) 100 unit/mL kwikpen INJECT 10 UNITS UNDER THE SKIN THREE TIMES A DAY WITH MEALS 15 mL 3     No current facility-administered medications on file prior to visit. ROS:  General: denies agitation, major chest pain, unexpected weakness  Patient states appropriate  Skin: healing wound is without issue or drainage   Strength: appropriate weakness of involved extremity is resolving since surgery      Physical Examination:    Blood pressure 133/86, pulse 83, temperature 97.2 °F (36.2 °C), temperature source Temporal, resp. rate 18, height 5' 9\" (1.753 m), weight (!) 357 lb (161.9 kg), SpO2 96 %. Dressing: none  Skin: clean, dry, intact  Sensation intact to light touch at level of wound and distally  Strength is 5/5 knee extension and flexion  Range of motion is minimally limited at the end of range of motion.   Distal swelling is noted, but appropriate for postoperative course  Distal capillary refill less than 2 seconds      Imaging:    Postoperative imaging: none      Assessment: Status post right knee arthroscopy, partial medial meniscectomy    Plan:  Patient will continue physical therapy  Wound care was reviewed  Weightbearing will be as tolerated through the right leg  Deep Venous Thrombosis Prophylaxis: none    Follow up will be at one month

## 2023-01-25 NOTE — TELEPHONE ENCOUNTER
Patient stopped by the office and stated that she received the letter stating she was denied for the freestyle latasha. She stated it stated that she has not tried the [employer] [facility] so it was denied.   Patient would like a script for the Dexcom sent to her pharmacy

## 2023-01-25 NOTE — TELEPHONE ENCOUNTER
1/25/2023  9:49 AM    Patient requesting call to discuss receipt of denial of the freestyle latasha 2 sensor by her insurance. Told she has to try something else before it can be approved. Can be reached at [phone].     Thanks,  Treasure Rodriguez

## 2023-01-27 NOTE — TELEPHONE ENCOUNTER
Pt notified of message per Dr. Ad Medellin and voiced understanding of what was read to her. Patient will check with her insurance company and let us know.

## 2023-02-03 ENCOUNTER — HOSPITAL ENCOUNTER (OUTPATIENT)
Dept: PHYSICAL THERAPY | Age: 48
Discharge: HOME | End: 2023-02-03
Payer: COMMERCIAL

## 2023-02-03 PROCEDURE — 97110 THERAPEUTIC EXERCISES: CPT | Performed by: PHYSICAL THERAPIST

## 2023-02-03 PROCEDURE — 97162 PT EVAL MOD COMPLEX 30 MIN: CPT | Performed by: PHYSICAL THERAPIST

## 2023-02-03 NOTE — THERAPY EVALUATION
[address] Physical Therapy  [address] [place]  Phone: [phone] Fax: [phone]    Plan of Care/Statement of Necessity for Physical Therapy Services  2-15    Patient name: Alison Lopez  : 1975  Provider#: [id number]  Referral source: Bobo Butcher DO      Medical/Treatment Diagnosis: Right knee pain [M25.561]     Prior Hospitalization: see medical history     Comorbidities: anxiety, arthritis, back pain, BMI over 30, depression, DM II, headaches, Kidney/Bladder/Prostate or Urination Problems, sleep dysfunction  Prior Level of Function: 20 min of exercise seldom or never  Medications: Verified on Patient Summary List    Start of Care: 2/3/23      Onset Date: s/p right medial meniscectomy on 23       The Plan of Care and following information is based on the information from the initial evaluation. Assessment/ key information: The patient presents with a chief complaint of right knee pain and decreased strength and A/PROM (Ext= -10/0; Flex= 90/100), consistent with being s/p right medial meniscectomy on 23. The patient's decreased quad strength and overall tightness exacerbates her symptoms. She can only tolerated 20 minutes of weightbearing until the pain is significant enough that she needs to sit. 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 The patient will benefit from guided therapeutic interventions such as therex for strengthening and neuromuscular re-education, manual techniques for joint mobility and soft tissue extensibility, and modalities for pain management in order to improve functional mobility with daily activities.       Evaluation Complexity History MEDIUM  Complexity : 1-2 comorbidities / personal factors will impact the outcome/ POC ; Examination MEDIUM Complexity : 3 Standardized tests and measures addressing body structure, function, activity limitation and / or participation in recreation  ;Presentation MEDIUM Complexity : Evolving with changing characteristics  ; Clinical Decision Making MEDIUM Complexity : FOTO score of 26-74  Overall Complexity Rating: MEDIUM    Problem List: pain affecting function, decrease ROM, decrease strength, edema affecting function, impaired gait/ balance, decrease ADL/ functional abilitiies, decrease activity tolerance, decrease flexibility/ joint mobility, and decrease transfer abilities   Treatment Plan may include any combination of the following: Therapeutic exercise, Neuromuscular reeducation, Manual therapy, Therapeutic activity, Self care/home management, Electric stim unattended , Vasopneumatic device, Gait training, Ultrasound, Mechanical traction, Electric stim attended, Needle insertion w/o injection (1 or 2 muscles), and Needle insertion w/o injection (3+ muscles)  Patient / Family readiness to learn indicated by: asking questions, trying to perform skills, and interest  Persons(s) to be included in education: patient (P)  Barriers to Learning/Limitations: None  Patient Goal (s): to go back to my active life  Patient Self Reported Health Status: fair  Rehabilitation Potential: good    Short Term Goals: To be accomplished in 2 treatments:                         1.) The patient will be independent with their HEP consistently for at least one week. Long Term Goals: To be accomplished in 24 treatments:                         1.) The patient will have at most 5/10 pain with daily activities. 2.) The patient will be able to ambulate for at least 30min without having to change positions due to pain. 3.) The patient will improve their FOTO score from 57 to at least 65 to show improvements in functional mobility.     4.) The patient will improve her A/PROM Knee flexion to at least 110/120 and extension to at least -5/0 to assist with daily activities. Frequency / Duration: Patient to be seen 2 times per week for 24 treatments. Patient/ Caregiver education and instruction: self care, activity modification, and exercises    [x]  Plan of care has been reviewed with MIKE Pereira, PT 2/3/2023     ________________________________________________________________________    I certify that the above Therapy Services are being furnished while the patient is under my care. I agree with the treatment plan and certify that this therapy is necessary.     [de-identified] Signature:____________________  Date:____________Time: _________      Martin Ricks DO

## 2023-02-03 NOTE — PROGRESS NOTES
PT INITIAL EVALUATION NOTE - [facility] 2-15    Patient Name: Torres Ugalde  FHPY:  : 1975  [x]  Patient  Verified  Payor: Joann Gayle / [facility] [address] / Product Type: PPO /    In time: 7:45am  Out time: 8:40am  Total Treatment Time (min): 55  Total Timed Codes (min): 25  1:1 Treatment Time ( only): 25   Visit #: 1     Treatment Area: Right knee pain [M25.561]    SUBJECTIVE  Pain Level (0-10 scale): 0 (0-10/10)  Any medication changes, allergies to medications, adverse drug reactions, diagnosis change, or new procedure performed?: [] No    [x] Yes (see summary sheet for update)  Subjective: The patient had a right medial meniscectomy on 23. She had a walker until last Friday. She just returned back to work on Monday, and about 20 min max on her feet and she will need to sit. She has 3 steps to get in the house, and then another 3 steps, no support/handrails. She goes up one step and a time and down sideways, but has been doing this since she injured herself 3 years ago. 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 She tried carrying a haynet that's about 40lbs, and that was irritating. She ices about 30 minutes nightly. OBJECTIVE/EXAMINATION  Posture:  scapular protraction bilaterally  Other Observations:  SLS: L= 30s; R= 19s, p!   Gait and Functional Mobility:  left toe out more than right; right toe straight; decreased knee extension on right  Palpation: tender to palpate posterior knee    A/PROM Right Knee: Flex 90/100  Ext -10/0  A/PROM Left Knee: Flex 125/130  Ext 0/0    Joint Mobility Assessment: hypomobile on right    Flexibility: tight hamstrings/calves    LOWER QUARTER   MUSCLE STRENGTH  KEY       R  L  0 - No Contraction  Knee ext  nt  4  1 - Trace            flex  4-  4  2 - Poor   Hip ext   nt  nt  3 - Fair          flex   nt  nt  4 - Good         abd  3+  3+  5 - Normal add  nt  nt      Ankle DF  4  4                PF  4  4                INV  nt  nt                EV  4-  4-      MMT: nt  Neurological: Reflexes / Sensations: nt      25 min Therapeutic Exercise:  [x] See flow sheet :   Rationale: increase ROM, increase strength, and improve coordination to improve the patients ability to perform daily activities.           With   [x] TE   [] TA   [] Neuro   [] SC   [] other: Patient Education: [x] Review HEP    [x] Progressed/Changed HEP based on:   [x] positioning   [x] body mechanics   [] transfers   [] heat/ice application    [] other:      Other Objective/Functional Measures: FOTO Functional Measure: 57/100                         Pain Level (0-10 scale) post treatment: 0    ASSESSMENT/Changes in Function:     [x]  See Plan of [address] Jonny Ruddy, PT 2/3/2023

## 2023-02-07 ENCOUNTER — HOSPITAL ENCOUNTER (OUTPATIENT)
Dept: PHYSICAL THERAPY | Age: 48
Discharge: HOME | End: 2023-02-07
Payer: COMMERCIAL

## 2023-02-07 PROCEDURE — 97110 THERAPEUTIC EXERCISES: CPT

## 2023-02-07 NOTE — PROGRESS NOTES
PT DAILY TREATMENT NOTE - [facility] 2-15    Patient Name: Adrianne Bella  XYZZ:[birth year]  : 1975  [x]  Patient  Verified  Payor: Jose Ramon Rao / Plan: [facility] [address] / Product Type: PPO /    In time: 705  Out time: 754  Total Treatment Time (min): 49  Total Timed Codes (min): 49  1:1 Treatment Time ( only): 52   Visit #:  2    Treatment Area: Right knee pain [M25.561]    SUBJECTIVE  Pain Level (0-10 scale): 4/10  Any medication changes, allergies to medications, adverse drug reactions, diagnosis change, or new procedure performed?: [x] No    [] Yes (see summary sheet for update)  Subjective functional status/changes:   [] No changes reported  Patient noted their last few days have been \"pretty bad,\" noted they had a lot of increased soreness, fatigue and pain following previous treatment session. Noted they felt increased irritation about an hour after treatment and did not seem to improve. Noted they continued to work on HEP and noted their pain seemed to get \"worse and worse,\". Noted they tried their bridges at home and noted increased L lower back pain that has not resolved since. Noted they will have a training session coming up in preparation for horse show season. OBJECTIVE      49 min Therapeutic Exercise:  [x] See flow sheet :   Rationale: increase ROM, increase strength, improve coordination, improve balance, and increase proprioception to improve the patients ability to perform daily activities.           With   [x] TE   [] TA   [] Neuro   [] SC   [] other: Patient Education: [x] Review HEP    [] Progressed/Changed HEP based on:   [] positioning   [] body mechanics   [] transfers   [] heat/ice application    [] other:      Other Objective/Functional Measures: NA     Pain Level (0-10 scale) post treatment: 4-5 \"cramping,\"    ASSESSMENT/Changes in Function:   Patient tolerated treatment session moderately today, needed to omit repetitions/modify during today's treatment session due to increased pain symptoms. Patient noted decreased transfer speed and time to complete exercises secondary to increased pain in R knee during treatment session today. Patient declined to perform bridges due to increased lower back pain, transitioned to gluteal sets today and was able to perform without increasing symptoms. Continue to progress as tolerated. Patient will continue to benefit from skilled PT services to modify and progress therapeutic interventions, address functional mobility deficits, address ROM deficits, address strength deficits, analyze and address soft tissue restrictions, analyze and cue movement patterns, analyze and modify body mechanics/ergonomics, and assess and modify postural abnormalities to attain remaining goals. [x]  See Plan of Care  []  See progress note/recertification  []  See Discharge Summary         Progress towards goals / Updated goals:  Short Term Goals: To be accomplished in 2 treatments:                         1.) The patient will be independent with their HEP consistently for at least one week. Long Term Goals: To be accomplished in 24 treatments:                         1.) The patient will have at most 5/10 pain with daily activities. 2.) The patient will be able to ambulate for at least 30min without having to change positions due to pain. 3.) The patient will improve their FOTO score from 57 to at least 65 to show improvements in functional mobility. 4.) The patient will improve her A/PROM Knee flexion to at least 110/120 and extension to at least -5/0 to assist with daily activities.     PLAN  [x]  Upgrade activities as tolerated     [x]  Continue plan of care  [x]  Update interventions per flow sheet       []  Discharge due to:_  []  Other:_      Gordo Michel, PTA 2/7/2023

## 2023-02-09 ENCOUNTER — APPOINTMENT (OUTPATIENT)
Dept: PHYSICAL THERAPY | Age: 48
End: 2023-02-09
Payer: COMMERCIAL

## 2023-02-13 ENCOUNTER — PATIENT MESSAGE (OUTPATIENT)
Dept: ENDOCRINOLOGY | Age: 48
End: 2023-02-13

## 2023-02-13 DIAGNOSIS — E11.65 TYPE 2 DIABETES MELLITUS WITH HYPERGLYCEMIA, WITH LONG-TERM CURRENT USE OF INSULIN (HCC): Primary | ICD-10-CM

## 2023-02-13 DIAGNOSIS — Z79.4 TYPE 2 DIABETES MELLITUS WITH HYPERGLYCEMIA, WITH LONG-TERM CURRENT USE OF INSULIN (HCC): Primary | ICD-10-CM

## 2023-02-15 ENCOUNTER — HOSPITAL ENCOUNTER (OUTPATIENT)
Dept: PHYSICAL THERAPY | Age: 48
Discharge: HOME | End: 2023-02-15
Payer: COMMERCIAL

## 2023-02-15 PROCEDURE — 97140 MANUAL THERAPY 1/> REGIONS: CPT

## 2023-02-15 PROCEDURE — 97110 THERAPEUTIC EXERCISES: CPT

## 2023-02-15 NOTE — PROGRESS NOTES
PT DAILY TREATMENT NOTE - [facility] 2-15    Patient Name: Tammie Saenz  Date:2/15/2023  : 1975  [x]  Patient  Verified  Payor: Eron Botello / Plan: [facility] [address] / Product Type: PPO /    In time: 529  Out time: 620  Total Treatment Time (min): 51  Total Timed Codes (min): 51  1:1 Treatment Time (MC only): 34  Visit #:  3    Treatment Area: Right knee pain [M25.561]    SUBJECTIVE  Pain Level (0-10 scale): 6-7/10  Any medication changes, allergies to medications, adverse drug reactions, diagnosis change, or new procedure performed?: [x] No    [] Yes (see summary sheet for update)  Subjective functional status/changes:   [] No changes reported  Patient noted they have been in increased amounts of pain since Monday, noted they had a fall going up the stairs. Noted they were carrying groceries indoors when their R knee buckled and they feel forward. Noted they continue to work on their exercises and they have gotten a little bit easier but continue to give them irritation, especially when straightening their knee. Patient noted they did try to get with their  in preparation for their horse show and noted it was \"very bad,\" noted they were not able to run or walk fast and had a large increase of pain. OBJECTIVE      41 min Therapeutic Exercise:  [x] See flow sheet :   Rationale: increase ROM, increase strength, improve coordination, improve balance, and increase proprioception to improve the patients ability to perform daily activities. 10 min Manual Therapy: Patellar mobilizations (Grade III) all planes to tolerance/EOB flexion as tolerated/supine mobs into extension with overpressure to tolerance. Rationale: decrease pain, increase ROM, increase tissue extensibility, decrease trigger points, and increase postural awareness to improve the patients ability to perform daily activities.              With   [x] TE   [] TA   [] Neuro   [] SC   [] other: Patient Education: [x] Review HEP    [] Progressed/Changed HEP based on:   [] positioning   [] body mechanics   [] transfers   [] heat/ice application    [] other:      Other Objective/Functional Measures: NA     Pain Level (0-10 scale) post treatment: 4/10    ASSESSMENT/Changes in Function:   Patient tolerated treatment session well today, able to perform new exercises and progressions while decreasing pain symptoms post treatment session. Patient continues to note rapid fatigue with strengthening but noted increased exercise tolerance compared to previous treatment session. Continue to progress as tolerated. Patient will continue to benefit from skilled PT services to modify and progress therapeutic interventions, address functional mobility deficits, address ROM deficits, address strength deficits, analyze and address soft tissue restrictions, analyze and cue movement patterns, analyze and modify body mechanics/ergonomics, and assess and modify postural abnormalities to attain remaining goals. [x]  See Plan of Care  []  See progress note/recertification  []  See Discharge Summary         Progress towards goals / Updated goals:  Short Term Goals: To be accomplished in 2 treatments:                         1.) The patient will be independent with their HEP consistently for at least one week. Long Term Goals: To be accomplished in 24 treatments:                         1.) The patient will have at most 5/10 pain with daily activities. 2.) The patient will be able to ambulate for at least 30min without having to change positions due to pain. 3.) The patient will improve their FOTO score from 57 to at least 65 to show improvements in functional mobility. 4.) The patient will improve her A/PROM Knee flexion to at least 110/120 and extension to at least -5/0 to assist with daily activities.     PLAN  [x]  Upgrade activities as tolerated     [x]  Continue plan of care  [x]  Update interventions per flow sheet       []  Discharge due to:_  []  Other:_      Wilber Blight, PTA 2/15/2023

## 2023-02-16 RX ORDER — BLOOD-GLUCOSE SENSOR
EACH MISCELLANEOUS
Qty: 3 EACH | Refills: 11 | Status: SHIPPED | OUTPATIENT
Start: 2023-02-16

## 2023-02-16 RX ORDER — BLOOD-GLUCOSE TRANSMITTER
EACH MISCELLANEOUS
Qty: 1 EACH | Refills: 3 | Status: SHIPPED | OUTPATIENT
Start: 2023-02-16

## 2023-02-16 RX ORDER — BLOOD-GLUCOSE,RECEIVER,CONT
EACH MISCELLANEOUS
Qty: 1 EACH | Refills: 0 | Status: SHIPPED | OUTPATIENT
Start: 2023-02-16

## 2023-02-16 NOTE — TELEPHONE ENCOUNTER
Spoke with ms Fair and Dexcom prescription sent to [facility] delivery per patient request

## 2023-02-20 ENCOUNTER — TELEPHONE (OUTPATIENT)
Dept: FAMILY MEDICINE CLINIC | Age: 48
End: 2023-02-20

## 2023-02-20 DIAGNOSIS — F41.9 ANXIETY: ICD-10-CM

## 2023-02-20 DIAGNOSIS — F33.9 RECURRENT MAJOR DEPRESSIVE DISORDER, REMISSION STATUS UNSPECIFIED (HCC): ICD-10-CM

## 2023-02-20 RX ORDER — SERTRALINE HYDROCHLORIDE 50 MG/1
50 TABLET, FILM COATED ORAL DAILY
Qty: 30 TABLET | Refills: 0 | Status: SHIPPED | OUTPATIENT
Start: 2023-02-20

## 2023-02-20 NOTE — TELEPHONE ENCOUNTER
Pt is requesting a refill on her Zoloft the message below she sent on mychart 02/13/23 with no response. The medication is working. I'm back into a better mind frame. I'm back at work and I'm able to handle things slightly better. Can I get a refill ? Or do I have to make an appointment,  since I don't have time to take off for work. Please refill or let her know if appt is needed.

## 2023-02-21 NOTE — TELEPHONE ENCOUNTER
Attempted to contact patient at number on file, no answer, left a message on patient's personal VM per Dr. Aishwarya Tom I have given her a 30 days refill, but I need to see her prior to more refills.

## 2023-02-22 ENCOUNTER — HOSPITAL ENCOUNTER (OUTPATIENT)
Dept: PHYSICAL THERAPY | Age: 48
Discharge: HOME | End: 2023-02-22
Payer: COMMERCIAL

## 2023-02-22 PROCEDURE — 97535 SELF CARE MNGMENT TRAINING: CPT

## 2023-02-22 PROCEDURE — 97110 THERAPEUTIC EXERCISES: CPT

## 2023-02-22 NOTE — PROGRESS NOTES
PT DAILY TREATMENT NOTE - [facility] 2-15    Patient Name: Juni Villegas  Date:2023  : 1975  [x]  Patient  Verified  Payor: Cassie Ardon / [facility] [address] / Product Type: PPO /    In time: 531  Out time: 628  Total Treatment Time (min): 57  Total Timed Codes (min): 57  1:1 Treatment Time ( only): 33  Visit #:  4    Treatment Area: Right knee pain [M25.561]    SUBJECTIVE  Pain Level (0-10 scale): /10  Any medication changes, allergies to medications, adverse drug reactions, diagnosis change, or new procedure performed?: [x] No    [] Yes (see summary sheet for update)  Subjective functional status/changes:   [] No changes reported  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 Notes they will stop for a bit while pain subsides then they will attempt to run another 4-5 steps while they \"push through the pain,\". Patient also notes they have noticed increased amounts of swelling since previous treatment session, especially with increased weight bearing positions. 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 OBJECTIVE      42 min Therapeutic Exercise:  [x] See flow sheet :   Rationale: increase ROM, increase strength, improve coordination, improve balance, and increase proprioception to improve the patients ability to perform daily activities. 15 min Self Care/Home Management:  []  See flow sheet : Time spent advising patient on proper progression of weight bearing activities and timeline for recovery to avoid continued set backs with pain and swelling symptoms. Patient verbalized understanding following.     Rationale: increase ROM, increase strength, improve coordination, improve balance, and increase proprioception  to improve the patients ability to perform daily activities. NT min Manual Therapy: Patellar mobilizations (Grade III) all planes to tolerance/EOB flexion as tolerated/supine mobs into extension with overpressure to tolerance. Rationale: decrease pain, increase ROM, increase tissue extensibility, decrease trigger points, and increase postural awareness to improve the patients ability to perform daily activities. With   [x] TE   [] TA   [] Neuro   [] SC   [] other: Patient Education: [x] Review HEP    [x] Progressed/Changed HEP based on:   [x] positioning   [x] body mechanics   [x] transfers   [] heat/ice application    [] other:      Other Objective/Functional Measures: NA     Pain Level (0-10 scale) post treatment: 4/10    ASSESSMENT/Changes in Function:   Patient tolerated treatment session fairly today, able to perform exercises today. Patient continues to note increased amounts of pain and swelling when attempting to run on grass with their . Large portion of time spent advising patient on recovery time line following surgery and advising to progress slowly to running withh physical therapy to avoid continued flaring up of R knee with attempted running and horse shows. Patient continues to note increased amounts of pain with weight bearing exercises, omitted further repetitions of sidesteps and monster walks due to increased pain levels. Continue to progress as tolerated. Patient will continue to benefit from skilled PT services to modify and progress therapeutic interventions, address functional mobility deficits, address ROM deficits, address strength deficits, analyze and address soft tissue restrictions, analyze and cue movement patterns, analyze and modify body mechanics/ergonomics, and assess and modify postural abnormalities to attain remaining goals.      [x]  See Plan of Care  []  See progress note/recertification  []  See Discharge Summary         Progress towards goals / Updated goals:  Short Term Goals: To be accomplished in 2 treatments:                         1.) The patient will be independent with their HEP consistently for at least one week. Long Term Goals: To be accomplished in 24 treatments:                         1.) The patient will have at most 5/10 pain with daily activities. 2.) The patient will be able to ambulate for at least 30min without having to change positions due to pain. 3.) The patient will improve their FOTO score from 57 to at least 65 to show improvements in functional mobility. 4.) The patient will improve her A/PROM Knee flexion to at least 110/120 and extension to at least -5/0 to assist with daily activities.     PLAN  [x]  Upgrade activities as tolerated     [x]  Continue plan of care  [x]  Update interventions per flow sheet       []  Discharge due to:_  []  Other:_      Leonel Tovar, PTA 2/22/2023

## 2023-02-23 ENCOUNTER — OFFICE VISIT (OUTPATIENT)
Dept: ORTHOPEDIC SURGERY | Age: 48
End: 2023-02-23
Payer: COMMERCIAL

## 2023-02-23 ENCOUNTER — TELEPHONE (OUTPATIENT)
Dept: ORTHOPEDIC SURGERY | Age: 48
End: 2023-02-23

## 2023-02-23 VITALS
WEIGHT: 293 LBS | BODY MASS INDEX: 43.4 KG/M2 | HEIGHT: 69 IN | OXYGEN SATURATION: 96 % | SYSTOLIC BLOOD PRESSURE: 109 MMHG | DIASTOLIC BLOOD PRESSURE: 70 MMHG | TEMPERATURE: 98.4 F | HEART RATE: 105 BPM

## 2023-02-23 DIAGNOSIS — S83.231D COMPLEX TEAR OF MEDIAL MENISCUS OF RIGHT KNEE, SUBSEQUENT ENCOUNTER: Primary | ICD-10-CM

## 2023-02-23 DIAGNOSIS — M17.11 UNILATERAL PRIMARY OSTEOARTHRITIS, RIGHT KNEE: ICD-10-CM

## 2023-02-23 PROCEDURE — 99024 POSTOP FOLLOW-UP VISIT: CPT | Performed by: ORTHOPAEDIC SURGERY

## 2023-02-23 RX ORDER — HYALURONATE SOD, CROSS-LINKED 30 MG/3 ML
25 SYRINGE (ML) INTRAARTICULAR ONCE
Qty: 3 ML | Refills: 0 | Status: SHIPPED | OUTPATIENT
Start: 2023-02-23 | End: 2023-02-23

## 2023-02-23 RX ORDER — DICLOFENAC SODIUM 75 MG/1
75 TABLET, DELAYED RELEASE ORAL 2 TIMES DAILY
Qty: 60 TABLET | Refills: 0 | Status: SHIPPED | OUTPATIENT
Start: 2023-02-23

## 2023-02-23 NOTE — LETTER
2/23/2023    Patient: Dali Snow   YOB: 1975   Date of Visit: 2/23/2023     Russell Cook [address] [place]    Dear Lindsey Sanabria MD,      Thank you for referring Ms. Dali Snow to [facility] for evaluation. My notes for this consultation are attached. If you have questions, please do not hesitate to call me. I look forward to following your patient along with you.       Sincerely,    Tiffany Allen, DO

## 2023-02-23 NOTE — PROGRESS NOTES
Identified pt with two pt identifiers (name and ). Reviewed chart in preparation for visit and have obtained necessary documentation. Tamera Leung is a 41 y.o. female  Chief Complaint   Patient presents with    Surgical Follow-up     Rt Knee     Visit Vitals  /70 (BP 1 Location: Right upper arm, BP Patient Position: Sitting, BP Cuff Size: Large adult)   Pulse (!) 105   Temp 98.4 °F (36.9 °C) (Tympanic)   Ht 5' 9\" (1.753 m)   Wt (!) 356 lb (161.5 kg)   SpO2 96%   BMI 52.57 kg/m²     1. Have you been to the ER, urgent care clinic since your last visit? Hospitalized since your last visit? No    2. Have you seen or consulted any other health care providers outside of the [address] [place] [address] since your last visit? Include any pap smears or colon screening.  No

## 2023-02-23 NOTE — PROGRESS NOTES
is here for a follow up visit from a right knee arthroscopy, partial medial meniscectomy. Pain has been appropriate since surgery, no major medical complications since surgery. Moderate residual pain. Current Outpatient Medications on File Prior to Visit   Medication Sig Dispense Refill    sertraline (Zoloft) 50 mg tablet Take 1 Tablet by mouth daily. 30 Tablet 0    Dexcom G6 Transmitter jessica For use with DEXCOM SENSOR  E11.65 1 Each 3    Dexcom G6  misc Use as directed  E11.65 1 Each 0    Dexcom G6 Sensor jessica Use as directed every 10 days E11.65 3 Each 11    HYDROcodone-acetaminophen (NORCO) 5-325 mg per tablet Take 1 Tablet by mouth every eight (8) hours as needed for Pain. aspirin delayed-release 325 mg tablet Take 1 Tablet by mouth two (2) times a day. 60 Tablet 0    naproxen sodium 220 mg cap Take  by mouth as needed. Lantus Solostar U-100 Insulin 100 unit/mL (3 mL) inpn INJECT 50 UNITS UNDER THE SKIN DAILY AT BEDTIME 30 mL 5    glipiZIDE SR (GLUCOTROL XL) 10 mg CR tablet Take 2 Tablets by mouth daily. 60 Tablet 3    semaglutide (Ozempic) 1 mg/dose (4 mg/3 mL) pnij 1 mg by SubCUTAneous route every seven (7) days. 1 Each 3    insulin lispro (HUMALOG) 100 unit/mL kwikpen INJECT 10 UNITS UNDER THE SKIN THREE TIMES A DAY WITH MEALS 15 mL 3    Insulin Needles, Disposable, (BD Ultra-Fine Short Pen Needle) 31 gauge x 5/16\" ndle USE ONE PEN NEEDLE EVERY 7 DAYS OR AS DIRECTED E11.65 100 Each 4    FreeStyle Dominik 2 Sensor kit E11.65   Use 2 sensors per month with reader (Patient taking differently: E11.65   Use 2 sensors per month with reader) 2 Kit 12    medroxyPROGESTERone (PROVERA) 10 mg tablet Take 1 Tablet by mouth daily. Take 10 days/month.  30 Tablet 4    Blood-Glucose Meter monitoring kit Please take readings 4 times a day, before breakfast, lunch, dinner and at bedtime 1 Kit 0    glucose blood VI test strips (Glucocom Glucose) strip TID AC, QHs 100 Strip 0    lancets-blood glucose strips 30 gauge cmpk 1 Package by Does Not Apply route Before breakfast, lunch, dinner and at bedtime. 1 Dose Pack 0     No current facility-administered medications on file prior to visit. ROS:  General: denies agitation, major chest pain, unexpected weakness  Patient states intermittent pain  Skin: healing wound is without issue or drainage   Strength: appropriate weakness of involved extremity is resolving since surgery      Physical Examination:    Blood pressure 109/70, pulse (!) 105, temperature 98.4 °F (36.9 °C), temperature source Tympanic, height 5' 9\" (1.753 m), weight (!) 356 lb (161.5 kg), SpO2 96 %. Dressing: none  Skin: clean, dry, intact  Sensation intact to light touch at level of wound and distally  Strength is 5/5 knee extension and flexion  Range of motion is minimally limited at the end of range of motion. Distal swelling is noted, but appropriate for postoperative course  Distal capillary refill less than 2 seconds      Imaging:    Postoperative imaging: none      Assessment: Status post right knee arthroscopy, partial medial meniscectomy, arthritic symptoms    Plan:  Patient will continue physical therapy  Wound care was reviewed  Weightbearing will be as tolerated through the right leg  Deep Venous Thrombosis Prophylaxis: none    Follow up will be for gel shots.

## 2023-02-24 ENCOUNTER — TELEPHONE (OUTPATIENT)
Dept: ENDOCRINOLOGY | Age: 48
End: 2023-02-24

## 2023-02-24 ENCOUNTER — DOCUMENTATION ONLY (OUTPATIENT)
Dept: ENDOCRINOLOGY | Age: 48
End: 2023-02-24

## 2023-03-07 ENCOUNTER — TELEPHONE (OUTPATIENT)
Dept: ORTHOPEDIC SURGERY | Age: 48
End: 2023-03-07

## 2023-03-09 ENCOUNTER — TELEPHONE (OUTPATIENT)
Dept: ORTHOPEDIC SURGERY | Age: 48
End: 2023-03-09

## 2023-03-09 ENCOUNTER — OFFICE VISIT (OUTPATIENT)
Dept: NEUROLOGY | Age: 48
End: 2023-03-09

## 2023-03-09 ENCOUNTER — OFFICE VISIT (OUTPATIENT)
Dept: ORTHOPEDIC SURGERY | Age: 48
End: 2023-03-09

## 2023-03-09 VITALS
OXYGEN SATURATION: 97 % | BODY MASS INDEX: 43.4 KG/M2 | TEMPERATURE: 98.7 F | HEART RATE: 86 BPM | WEIGHT: 293 LBS | DIASTOLIC BLOOD PRESSURE: 75 MMHG | SYSTOLIC BLOOD PRESSURE: 117 MMHG | HEIGHT: 69 IN

## 2023-03-09 DIAGNOSIS — M17.11 UNILATERAL PRIMARY OSTEOARTHRITIS, RIGHT KNEE: Primary | ICD-10-CM

## 2023-03-09 DIAGNOSIS — G56.01 CARPAL TUNNEL SYNDROME ON RIGHT: ICD-10-CM

## 2023-03-09 DIAGNOSIS — G56.01 CARPAL TUNNEL SYNDROME OF RIGHT WRIST: Primary | ICD-10-CM

## 2023-03-09 RX ORDER — HYALURONATE SODIUM 10 MG/ML
25 SYRINGE (ML) INTRAARTICULAR ONCE
Status: COMPLETED | OUTPATIENT
Start: 2023-03-09 | End: 2023-03-09

## 2023-03-09 RX ADMIN — Medication 25 MG: at 08:58

## 2023-03-09 NOTE — TELEPHONE ENCOUNTER
LVM for pt to c/b to schedule surgery.        Diagnosis: Right carpal tunnel syndrome G56.01   Procedure: Right carpal tunnel release   CPT: 97252   Operative minutes: 45   Disposition postop: home   Location: [facility] Main OR   PAT: yes   Class: no   Special Equipment: hand table, lead hand, hand tray, bipolar electrocautery   Staffing: Assistant/retractor gipson   Anesthesia: local with MAC

## 2023-03-09 NOTE — PROCEDURES
ELECTRODIAGNOSTIC REPORT      Test Date:  3/9/2023    Patient: Betty Lemons : 1975 Physician: Dr. Lilia Mcadams    ID#: [id number] SEX: Female Ref. Phys: Dr. Sun Rangel     Patient History / Exam:  The patient is a pleasant 16-year-old female who presents with sensory disturbance and weakness in her right upper extremity. She does have full strength. There is sensory loss in the median distribution. EMG & NCV Findings:      Nerve conduction studies as listed below were within reference of normal except for a prolongation of the peak latency with decreased amplitude of the right median sensory nerve. The median mixed nerve study revealed a prolongation of the peak latency with a greater than 0.4 ms difference between the median and ulnar studies. There was also a prolongation of the distal motor latency in the right median motor nerve with normal amplitude and conduction velocity     Disposable concentric needle examination of the muscles listed below in the right upper extremity was normal.    Interpretation: This study was abnormal.  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 2. There was no evidence of a widespread large fiber neuropathy, myopathy, or cervical radiculopathy       ___________________________  Samantha Finger  ASIM REAGAN  FAAN        Nerve Conduction Studies  Anti Sensory Summary Table     Stim Site NR Onset (ms) Peak (ms) O-P Amp (µV) votaem Norm O-P Amp Site1 Site2 Dist (cm) Norm Andrew (m/s)   Right Median Anti Sensory (2nd Digit)  34.5°C   Wrist    3.5 4.5 9.6  >11 Wrist 2nd Digit 14.0    Right Radial Anti Sensory (Base 1st Digit)  34.5°C   Wrist    1.6 2.1 21.4  >15 Wrist Base 1st Digit 10.0    Right Ulnar Anti Sensory (5th Digit)  34.5°C   Wrist    2.2 2.8 15.9  >10 Wrist 5th Digit 14.0      Motor Summary Table     Stim Site NR Onset (ms) Norm Onset (ms) O-P Amp (mV) Norm O-P Amp P-T Amp (mV) Site1 Site2 Dist (cm) Andrew (m/s)   Right Median Motor (Abd Poll Brev)  34.5°C   Wrist    5.8 <4.5 5.3 >4.1  Wrist Abd Poll Brev 8.0    Elbow    9.6  4.3   Elbow Wrist 21.0 55   Right Ulnar Motor (Abd Dig Minimi)  34.5°C   Wrist    3.0 <3.1 10.0 >7.0  Wrist Abd Dig Minimi 8.0    B Elbow    6.5  8.5   B Elbow Wrist 22.0 63   A Elbow    8.2  8.0   A Elbow B Elbow 10.0 59     Comparison Summary Table     Stim Site NR Peak (ms) P-T Amp (µV) Site1 Site2 Dist (cm) Delta-P (ms)   Right Median/Ulnar Palm Comparison (Wrist)  34.5°C   Median Palm    3.6 13.9 Median Palm Ulnar Palm 8.0 1.7   Ulnar Palm    1.9 16.7           EMG     Side Muscle Nerve Root Ins Act Fibs Psw Recrt Duration Amp Poly Comment   Right Deltoid Axillary C5-6 Nml Nml Nml Nml Nml Nml Nml    Right Triceps Radial C6-7-8 Nml Nml Nml Nml Nml Nml Nml    Right PronatorTeres Median C6-7 Nml Nml Nml Nml Nml Nml Nml    Right 1stDorInt Ulnar C8-T1 Nml Nml Nml Nml Nml Nml Nml    Right Abd Poll Brev Median C8-T1 Nml Nml Nml Nml Nml Nml Nml      Waveforms:
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
## 2023-03-09 NOTE — PROGRESS NOTES
Identified pt with two pt identifiers (name and ). Reviewed chart in preparation for visit and have obtained necessary documentation. Asad Buckner is a 57 y.o. female  Chief Complaint   Patient presents with    Joint Or Tendon Injection     RT Knee 1st Visco-3     Visit Vitals  /75 (BP 1 Location: Right arm, BP Patient Position: Sitting, BP Cuff Size: Large adult)   Pulse 86   Temp 98.7 °F (37.1 °C) (Tympanic)   Ht 5' 9\" (1.753 m)   Wt (!) 363 lb (164.7 kg)   SpO2 97%   BMI 53.61 kg/m²     1. Have you been to the ER, urgent care clinic since your last visit? Hospitalized since your last visit? No    2. Have you seen or consulted any other health care providers outside of the [address] [place] [address] since your last visit? Include any pap smears or colon screening.  No

## 2023-03-14 ENCOUNTER — TELEPHONE (OUTPATIENT)
Dept: ORTHOPEDIC SURGERY | Age: 48
End: 2023-03-14

## 2023-03-14 NOTE — TELEPHONE ENCOUNTER
What kind of paperwork? [employer] Absence Claim  2. Continuous leave or intermittent? Continuous  3. Is patient aware of $35.00 charge to be collected prior to papers being sent out? N/A  4. Is patient willing to pay said charge? N/A  5. Is patient aware of the 14 business day turn around for ALL paperwork? Yes  6. Is a release signed and on file? Yes  7. Has the patient completed their portion of the paperwork?     Yes    Received via fax 3/14/23

## 2023-03-14 NOTE — PERIOP NOTES
[facility]  Preoperative Instructions        Surgery Date 3/20/2023     Time of Arrival To Be Called  Contact# [phone]    1. On the day of your surgery, please report to the Surgical Services Registration Desk and sign in at your designated time. The Surgery Center is located to the right of the Emergency Room. 2. You must have someone with you to drive you home. You should not drive a car for 24 hours following surgery. Please make arrangements for a friend or family member to stay with you for the first 24 hours after your surgery. 3. Do not have anything to eat or drink (including water, gum, mints, coffee, juice) after midnight 3/19/2023 . ? This may not apply to medications prescribed by your physician. ?(Please note below the special instructions with medications to take the morning of your procedure.)    4. We recommend you do not drink any alcoholic beverages for 24 hours before and after your surgery. 5. Contact your surgeons office for instructions on the following medications: non-steroidal anti-inflammatory drugs (i.e. Advil, Aleve), vitamins, and supplements. (Some surgeons will want you to stop these medications prior to surgery and others may allow you to take them)  **If you are currently taking Plavix, Coumadin, Aspirin and/or other blood-thinning agents, contact your surgeon for instructions. ** Your surgeon will partner with the physician prescribing these medications to determine if it is safe to stop or if you need to continue taking. Please do not stop taking these medications without instructions from your surgeon    6. Wear comfortable clothes. Wear glasses instead of contacts. Do not bring any money or jewelry. Please bring picture ID, insurance card, and any prearranged co-payment or hospital payment. Do not wear make-up, particularly mascara the morning of your surgery.   Do not wear nail polish, particularly if you are having foot /hand surgery. Wear your hair loose or down, no ponytails, buns, geeta pins or clips. All body piercings must be removed. Please shower with antibacterial soap for three consecutive days before and on the morning of surgery, but do not apply any lotions, powders or deodorants after the shower on the day of surgery. Please use a fresh towels after each shower. Please sleep in clean clothes and change bed linens the night before surgery. Please do not shave for 48 hours prior to surgery. Shaving of the face is acceptable. 7. You should understand that if you do not follow these instructions your surgery may be cancelled. If your physical condition changes (I.e. fever, cold or flu) please contact your surgeon as soon as possible. 8. It is important that you be on time. If a situation occurs where you may be late, please call [phone] (OR Holding Area). 9. If you have any questions and or problems, please call [phone] (Pre-admission Testing). 10. Your surgery time may be subject to change. You will receive a phone call the evening prior if your time changes. 11.  If having outpatient surgery, you must have someone to drive you here, stay with you during the duration of your stay, and to drive you home at time of discharge. Follow all instructions given to you by your doctor. Insulin Dependent Diabetic Patients: Take your diabetic medications as prescribed the day before surgery. Hold all diabetic medications the morning of surgery. If you are scheduled to arrive for surgery after 8 am and your AM blood sugar is >200, please call the Surgery Center. TAKE ALL MEDICATIONS DAY OF SURGERY EXCEPT:  diabetic medications, diclofenac      I understand a pre-operative phone call will be made to verify my surgery time. In the event that I am not available, I give permission for a message to be left on my answering service and/or with another person?   yes         ___________________ __________   _________    (Signature of Patient)             (Witness)                (Date and Time)

## 2023-03-15 DIAGNOSIS — Z79.4 TYPE 2 DIABETES MELLITUS WITH HYPERGLYCEMIA, WITH LONG-TERM CURRENT USE OF INSULIN (HCC): ICD-10-CM

## 2023-03-15 DIAGNOSIS — E11.65 TYPE 2 DIABETES MELLITUS WITH HYPERGLYCEMIA, WITH LONG-TERM CURRENT USE OF INSULIN (HCC): ICD-10-CM

## 2023-03-15 RX ORDER — INSULIN LISPRO 100 [IU]/ML
INJECTION, SOLUTION INTRAVENOUS; SUBCUTANEOUS
Qty: 15 ML | Refills: 3 | Status: CANCELLED | OUTPATIENT
Start: 2023-03-15

## 2023-03-15 RX ORDER — INSULIN LISPRO 100 [IU]/ML
INJECTION, SOLUTION INTRAVENOUS; SUBCUTANEOUS
Qty: 45 ML | Refills: 3 | Status: SHIPPED | OUTPATIENT
Start: 2023-03-15

## 2023-03-16 ENCOUNTER — OFFICE VISIT (OUTPATIENT)
Dept: ORTHOPEDIC SURGERY | Age: 48
End: 2023-03-16

## 2023-03-16 VITALS
TEMPERATURE: 98 F | SYSTOLIC BLOOD PRESSURE: 132 MMHG | BODY MASS INDEX: 43.4 KG/M2 | HEART RATE: 81 BPM | OXYGEN SATURATION: 96 % | DIASTOLIC BLOOD PRESSURE: 86 MMHG | HEIGHT: 69 IN | WEIGHT: 293 LBS

## 2023-03-16 DIAGNOSIS — M17.11 UNILATERAL PRIMARY OSTEOARTHRITIS, RIGHT KNEE: Primary | ICD-10-CM

## 2023-03-16 RX ORDER — HYALURONATE SODIUM 10 MG/ML
25 SYRINGE (ML) INTRAARTICULAR ONCE
Status: COMPLETED | OUTPATIENT
Start: 2023-03-16 | End: 2023-03-16

## 2023-03-16 RX ADMIN — Medication 25 MG: at 15:05

## 2023-03-16 NOTE — PROGRESS NOTES
Identified pt with two pt identifiers (name and ). Reviewed chart in preparation for visit and have obtained necessary documentation. Lilia Prasad is a 57 y.o. female  Chief Complaint   Patient presents with    Joint Or Tendon Injection     Rt Knee     Visit Vitals  /86 (BP 1 Location: Right upper arm, BP Patient Position: Sitting, BP Cuff Size: Large adult)   Pulse 81   Temp 98 °F (36.7 °C) (Tympanic)   Ht 5' 9\" (1.753 m)   Wt (!) 363 lb (164.7 kg)   SpO2 96%   BMI 53.61 kg/m²     1. Have you been to the ER, urgent care clinic since your last visit? Hospitalized since your last visit? No    2. Have you seen or consulted any other health care providers outside of the [address] [place] [address] since your last visit? Include any pap smears or colon screening.  No

## 2023-03-16 NOTE — PROGRESS NOTES
Date of Procedure: 3/16/2023  PROCEDURE NOTE: Right knee injection of visco 3    Consent was obtained from the patient. The correct site was identified after confirmation with the patient. Following identification and confirmation of the correct site with the patient, the superolateral right knee was prepped in the normal sterile fashion. A local anesthetic of 1% lidocaine without epinephrine was then administered to the local tissues. Following, an injection of 25 mg visco 3 viscosupplementation prefilled syringe was administered to the right knee. The needle was then withdrawn and the injection site dressed with a sterile bandage at the conclusion. The procedure was well tolerated by the patient. No immediate adverse reactions were noted. Post injection instructions were given.
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
## 2023-03-20 ENCOUNTER — HOSPITAL ENCOUNTER (OUTPATIENT)
Age: 48
Setting detail: OUTPATIENT SURGERY
Discharge: HOME | End: 2023-03-20
Attending: ORTHOPAEDIC SURGERY | Admitting: ORTHOPAEDIC SURGERY
Payer: COMMERCIAL

## 2023-03-20 ENCOUNTER — ANESTHESIA EVENT (OUTPATIENT)
Dept: SURGERY | Age: 48
End: 2023-03-20
Payer: COMMERCIAL

## 2023-03-20 ENCOUNTER — ANESTHESIA (OUTPATIENT)
Dept: SURGERY | Age: 48
End: 2023-03-20
Payer: COMMERCIAL

## 2023-03-20 VITALS
OXYGEN SATURATION: 95 % | SYSTOLIC BLOOD PRESSURE: 114 MMHG | HEIGHT: 69 IN | DIASTOLIC BLOOD PRESSURE: 65 MMHG | HEART RATE: 74 BPM | TEMPERATURE: 98 F | BODY MASS INDEX: 43.4 KG/M2 | WEIGHT: 293 LBS | RESPIRATION RATE: 16 BRPM

## 2023-03-20 DIAGNOSIS — Z47.89 ORTHOPEDIC AFTERCARE: Primary | ICD-10-CM

## 2023-03-20 LAB
GLUCOSE BLD STRIP.AUTO-MCNC: 117 MG/DL (ref 65–117)
HCG SERPL QL: NEGATIVE
SERVICE CMNT-IMP: NORMAL

## 2023-03-20 PROCEDURE — 2709999900 HC NON-CHARGEABLE SUPPLY: Performed by: ORTHOPAEDIC SURGERY

## 2023-03-20 PROCEDURE — 64721 CARPAL TUNNEL SURGERY: CPT | Performed by: ORTHOPAEDIC SURGERY

## 2023-03-20 PROCEDURE — 76060000032 HC ANESTHESIA 0.5 TO 1 HR: Performed by: ORTHOPAEDIC SURGERY

## 2023-03-20 PROCEDURE — 76210000021 HC REC RM PH II 0.5 TO 1 HR: Performed by: ORTHOPAEDIC SURGERY

## 2023-03-20 PROCEDURE — 77030000032 HC CUF TRNQT ZIMM -B: Performed by: ORTHOPAEDIC SURGERY

## 2023-03-20 PROCEDURE — 77030040356 HC CORD BPLR FRCP COVD -A: Performed by: ORTHOPAEDIC SURGERY

## 2023-03-20 PROCEDURE — 74011250636 HC RX REV CODE- 250/636: Performed by: NURSE ANESTHETIST, CERTIFIED REGISTERED

## 2023-03-20 PROCEDURE — 74011250636 HC RX REV CODE- 250/636: Performed by: ORTHOPAEDIC SURGERY

## 2023-03-20 PROCEDURE — 76010000138 HC OR TIME 0.5 TO 1 HR: Performed by: ORTHOPAEDIC SURGERY

## 2023-03-20 PROCEDURE — 84703 CHORIONIC GONADOTROPIN ASSAY: CPT

## 2023-03-20 PROCEDURE — 36415 COLL VENOUS BLD VENIPUNCTURE: CPT

## 2023-03-20 PROCEDURE — 77030031139 HC SUT VCRL2 J&J -A: Performed by: ORTHOPAEDIC SURGERY

## 2023-03-20 PROCEDURE — 76210000006 HC OR PH I REC 0.5 TO 1 HR: Performed by: ORTHOPAEDIC SURGERY

## 2023-03-20 PROCEDURE — 74011250637 HC RX REV CODE- 250/637: Performed by: ORTHOPAEDIC SURGERY

## 2023-03-20 PROCEDURE — 74011000250 HC RX REV CODE- 250: Performed by: NURSE ANESTHETIST, CERTIFIED REGISTERED

## 2023-03-20 PROCEDURE — 77030002916 HC SUT ETHLN J&J -A: Performed by: ORTHOPAEDIC SURGERY

## 2023-03-20 PROCEDURE — 74011000250 HC RX REV CODE- 250: Performed by: ORTHOPAEDIC SURGERY

## 2023-03-20 PROCEDURE — 74011250636 HC RX REV CODE- 250/636: Performed by: ANESTHESIOLOGY

## 2023-03-20 PROCEDURE — 82962 GLUCOSE BLOOD TEST: CPT

## 2023-03-20 RX ORDER — HYDROMORPHONE HYDROCHLORIDE 1 MG/ML
0.5 INJECTION, SOLUTION INTRAMUSCULAR; INTRAVENOUS; SUBCUTANEOUS
Status: DISCONTINUED | OUTPATIENT
Start: 2023-03-20 | End: 2023-03-20 | Stop reason: HOSPADM

## 2023-03-20 RX ORDER — PROPOFOL 10 MG/ML
INJECTION, EMULSION INTRAVENOUS
Status: DISCONTINUED | OUTPATIENT
Start: 2023-03-20 | End: 2023-03-20 | Stop reason: HOSPADM

## 2023-03-20 RX ORDER — LIDOCAINE HYDROCHLORIDE 10 MG/ML
0.1 INJECTION, SOLUTION EPIDURAL; INFILTRATION; INTRACAUDAL; PERINEURAL AS NEEDED
Status: DISCONTINUED | OUTPATIENT
Start: 2023-03-20 | End: 2023-03-20 | Stop reason: HOSPADM

## 2023-03-20 RX ORDER — SODIUM CHLORIDE 0.9 % (FLUSH) 0.9 %
5-40 SYRINGE (ML) INJECTION AS NEEDED
Status: DISCONTINUED | OUTPATIENT
Start: 2023-03-20 | End: 2023-03-20 | Stop reason: HOSPADM

## 2023-03-20 RX ORDER — SODIUM CHLORIDE 0.9 % (FLUSH) 0.9 %
5-40 SYRINGE (ML) INJECTION EVERY 8 HOURS
Status: DISCONTINUED | OUTPATIENT
Start: 2023-03-20 | End: 2023-03-20 | Stop reason: HOSPADM

## 2023-03-20 RX ORDER — ACETAMINOPHEN 325 MG/1
650 TABLET ORAL ONCE
Status: DISCONTINUED | OUTPATIENT
Start: 2023-03-20 | End: 2023-03-20 | Stop reason: HOSPADM

## 2023-03-20 RX ORDER — DIPHENHYDRAMINE HYDROCHLORIDE 50 MG/ML
12.5 INJECTION, SOLUTION INTRAMUSCULAR; INTRAVENOUS AS NEEDED
Status: DISCONTINUED | OUTPATIENT
Start: 2023-03-20 | End: 2023-03-20 | Stop reason: HOSPADM

## 2023-03-20 RX ORDER — MIDAZOLAM HYDROCHLORIDE 1 MG/ML
INJECTION, SOLUTION INTRAMUSCULAR; INTRAVENOUS AS NEEDED
Status: DISCONTINUED | OUTPATIENT
Start: 2023-03-20 | End: 2023-03-20 | Stop reason: HOSPADM

## 2023-03-20 RX ORDER — FENTANYL CITRATE 50 UG/ML
INJECTION, SOLUTION INTRAMUSCULAR; INTRAVENOUS AS NEEDED
Status: DISCONTINUED | OUTPATIENT
Start: 2023-03-20 | End: 2023-03-20 | Stop reason: HOSPADM

## 2023-03-20 RX ORDER — TRAMADOL HYDROCHLORIDE 50 MG/1
50 TABLET ORAL
Status: DISCONTINUED | OUTPATIENT
Start: 2023-03-20 | End: 2023-03-20 | Stop reason: HOSPADM

## 2023-03-20 RX ORDER — ACETAMINOPHEN 500 MG
1000 TABLET ORAL ONCE
Status: COMPLETED | OUTPATIENT
Start: 2023-03-20 | End: 2023-03-20

## 2023-03-20 RX ORDER — MIDAZOLAM HYDROCHLORIDE 1 MG/ML
1 INJECTION, SOLUTION INTRAMUSCULAR; INTRAVENOUS AS NEEDED
Status: DISCONTINUED | OUTPATIENT
Start: 2023-03-20 | End: 2023-03-20 | Stop reason: HOSPADM

## 2023-03-20 RX ORDER — FENTANYL CITRATE 50 UG/ML
50 INJECTION, SOLUTION INTRAMUSCULAR; INTRAVENOUS AS NEEDED
Status: DISCONTINUED | OUTPATIENT
Start: 2023-03-20 | End: 2023-03-20 | Stop reason: HOSPADM

## 2023-03-20 RX ORDER — ROPIVACAINE HYDROCHLORIDE 5 MG/ML
30 INJECTION, SOLUTION EPIDURAL; INFILTRATION; PERINEURAL AS NEEDED
Status: DISCONTINUED | OUTPATIENT
Start: 2023-03-20 | End: 2023-03-20 | Stop reason: HOSPADM

## 2023-03-20 RX ORDER — ONDANSETRON 2 MG/ML
4 INJECTION INTRAMUSCULAR; INTRAVENOUS AS NEEDED
Status: DISCONTINUED | OUTPATIENT
Start: 2023-03-20 | End: 2023-03-20 | Stop reason: HOSPADM

## 2023-03-20 RX ORDER — BUPIVACAINE HYDROCHLORIDE AND EPINEPHRINE 5; 5 MG/ML; UG/ML
INJECTION, SOLUTION PERINEURAL AS NEEDED
Status: DISCONTINUED | OUTPATIENT
Start: 2023-03-20 | End: 2023-03-20 | Stop reason: HOSPADM

## 2023-03-20 RX ORDER — SODIUM CHLORIDE, SODIUM LACTATE, POTASSIUM CHLORIDE, CALCIUM CHLORIDE 600; 310; 30; 20 MG/100ML; MG/100ML; MG/100ML; MG/100ML
100 INJECTION, SOLUTION INTRAVENOUS CONTINUOUS
Status: DISCONTINUED | OUTPATIENT
Start: 2023-03-20 | End: 2023-03-20 | Stop reason: HOSPADM

## 2023-03-20 RX ORDER — FENTANYL CITRATE 50 UG/ML
25 INJECTION, SOLUTION INTRAMUSCULAR; INTRAVENOUS
Status: DISCONTINUED | OUTPATIENT
Start: 2023-03-20 | End: 2023-03-20 | Stop reason: HOSPADM

## 2023-03-20 RX ORDER — PROPOFOL 10 MG/ML
INJECTION, EMULSION INTRAVENOUS AS NEEDED
Status: DISCONTINUED | OUTPATIENT
Start: 2023-03-20 | End: 2023-03-20 | Stop reason: HOSPADM

## 2023-03-20 RX ORDER — MORPHINE SULFATE 2 MG/ML
2 INJECTION, SOLUTION INTRAMUSCULAR; INTRAVENOUS
Status: DISCONTINUED | OUTPATIENT
Start: 2023-03-20 | End: 2023-03-20 | Stop reason: HOSPADM

## 2023-03-20 RX ORDER — MIDAZOLAM HYDROCHLORIDE 1 MG/ML
0.5 INJECTION, SOLUTION INTRAMUSCULAR; INTRAVENOUS
Status: DISCONTINUED | OUTPATIENT
Start: 2023-03-20 | End: 2023-03-20 | Stop reason: HOSPADM

## 2023-03-20 RX ORDER — LIDOCAINE HYDROCHLORIDE 20 MG/ML
INJECTION, SOLUTION EPIDURAL; INFILTRATION; INTRACAUDAL; PERINEURAL AS NEEDED
Status: DISCONTINUED | OUTPATIENT
Start: 2023-03-20 | End: 2023-03-20 | Stop reason: HOSPADM

## 2023-03-20 RX ORDER — SODIUM CHLORIDE 9 MG/ML
25 INJECTION, SOLUTION INTRAVENOUS CONTINUOUS
Status: DISCONTINUED | OUTPATIENT
Start: 2023-03-20 | End: 2023-03-20 | Stop reason: HOSPADM

## 2023-03-20 RX ORDER — SODIUM CHLORIDE, SODIUM LACTATE, POTASSIUM CHLORIDE, CALCIUM CHLORIDE 600; 310; 30; 20 MG/100ML; MG/100ML; MG/100ML; MG/100ML
25 INJECTION, SOLUTION INTRAVENOUS CONTINUOUS
Status: DISCONTINUED | OUTPATIENT
Start: 2023-03-20 | End: 2023-03-20 | Stop reason: HOSPADM

## 2023-03-20 RX ORDER — CELECOXIB 200 MG/1
200 CAPSULE ORAL ONCE
Status: COMPLETED | OUTPATIENT
Start: 2023-03-20 | End: 2023-03-20

## 2023-03-20 RX ORDER — TRAMADOL HYDROCHLORIDE 50 MG/1
50 TABLET ORAL
Qty: 28 TABLET | Refills: 0 | Status: SHIPPED | OUTPATIENT
Start: 2023-03-20 | End: 2023-03-27

## 2023-03-20 RX ADMIN — FENTANYL CITRATE 30 MCG: 50 INJECTION, SOLUTION INTRAMUSCULAR; INTRAVENOUS at 08:40

## 2023-03-20 RX ADMIN — PROPOFOL 40 MG: 10 INJECTION, EMULSION INTRAVENOUS at 08:31

## 2023-03-20 RX ADMIN — MIDAZOLAM HYDROCHLORIDE 2 MG: 1 INJECTION, SOLUTION INTRAMUSCULAR; INTRAVENOUS at 08:26

## 2023-03-20 RX ADMIN — CELECOXIB 200 MG: 200 CAPSULE ORAL at 07:46

## 2023-03-20 RX ADMIN — Medication 3 G: at 08:29

## 2023-03-20 RX ADMIN — Medication 3 AMPULE: at 07:45

## 2023-03-20 RX ADMIN — PROPOFOL 75 MCG/KG/MIN: 10 INJECTION, EMULSION INTRAVENOUS at 08:32

## 2023-03-20 RX ADMIN — ACETAMINOPHEN 1000 MG: 500 TABLET ORAL at 07:46

## 2023-03-20 RX ADMIN — SODIUM CHLORIDE, POTASSIUM CHLORIDE, SODIUM LACTATE AND CALCIUM CHLORIDE 25 ML/HR: 600; 310; 30; 20 INJECTION, SOLUTION INTRAVENOUS at 07:45

## 2023-03-20 RX ADMIN — PROPOFOL 10 MG: 10 INJECTION, EMULSION INTRAVENOUS at 08:33

## 2023-03-20 RX ADMIN — LIDOCAINE HYDROCHLORIDE 100 MG: 20 INJECTION, SOLUTION EPIDURAL; INFILTRATION; INTRACAUDAL; PERINEURAL at 08:31

## 2023-03-20 NOTE — OP NOTES
Date of Procedure: 3/20/2023  Preoperative Diagnosis: Right carpal tunnel syndrome  Postoperative Diagnosis: same  Procedure Performed: Right open carpal tunnel release  Surgeon: Rasheed Dietz DO  Assistant: none  Anesthesia: monitored anesthesia care with local injection  Estimated Blood Loss: 5cc  Specimens: none  Drains: none  Complications: none  Implants: none      Operative Indications: This is a 57 y.o. female who failed nonoperative management of carpal tunnel syndrome, as confirmed by clinical examination and EMG. Based on failure of nonoperative management, carpal tunnel release was indicated. We did discuss the risks of surgery which include but are not limited to infection, nerve or blood vessel damage, median nerve, ulnar nerve, palmar cutaneous nerve, recurrent branch of the median nerve injury, failure of any possible implant, need for reoperation, postoperative pain and swelling, hand stiffness, need for therapy, pain with pressure on the incision site, death, disability, organ dysfunction, wound healing issues, DVT, PE, medical complications, and the need for further procedures. The patient did freely state their understanding and satisfaction with our discussion. Appropriate medical clearances have been obtained. Description of Procedure:  After the correct site and side were identified by myself in the holding area, the patient was transported to the surgical suite, where, after induction of monitored sedative anesthesia, the patient was positioned in the supine position. The right hand was then prepped and draped in the usual sterile orthopedic fashion. After an appropriate timeout, and confirmation that preoperative antibiotics have been given, infusion of 1/2% ropivacaine was injected into the palmar skin at the level of the carpal tunnel, care was taken not to penetrate too deeply below the transverse carpal ligament.   Once adequate anesthesia was confirmed, arm was exsanguinated and tourniquet taken to 250 mmHg. I then marked out lines which converged from the radial border of the fourth ray and Aguilar's line, this became the distalmost portion of the incision, I then created an incision which tracked proximally in line with the forearm. Strict hemostasis was maintained with bipolar electrocautery. Sharp dissection was carried down to the transverse carpal ligament, retractors were placed inside the wound, with meticulous technique, an incision was made at the level of the ligament, a freer elevator was then inserted below the ligament to protect the median nerve. I then carefully incised the ligament distally to the level of the palmar arch and distal fat collection, attention was then turned proximally where the [place] elevator was again used to protect the nerve and the transverse carpal ligament was released into the volar forearm fascia to complete the release. The median nerve was inspected, no injury had occurred to the nerve itself, the nerve was mobilized carefully to ensure that there was no investing scar or remaining impingement. Once the nerve was freely mobilized and in satisfactory condition, wound was irrigated, tourniquet was released and bleeding was controlled with the use of electrocautery. 3-0 Vicryl was used to close dermis, 3-0 nylon was then used to close skin. Sterile dressing was applied, care was taken to ensure that it was not too tight. Patient was then awoken and taken to PACU in stable condition without apparent intraoperative complication. Postoperative plan: Patient will be weightbearing as tolerated up to 5 pounds. Patient will have instructions on wound care, strict elevation and pain control is been emphasized. Follow-up in 2 weeks in office for suture removal, no x-rays are necessary.

## 2023-03-20 NOTE — ANESTHESIA POSTPROCEDURE EVALUATION
Procedure(s):  RIGHT CARPAL TUNNEL RELEASE (MAC WITH LOCAL). MAC    Anesthesia Post Evaluation        Patient location during evaluation: PACU  Note status: Adequate. Level of consciousness: responsive to verbal stimuli and sleepy but conscious  Pain management: satisfactory to patient  Airway patency: patent  Anesthetic complications: no  Cardiovascular status: acceptable  Respiratory status: acceptable  Hydration status: acceptable  Comments: +Post-Anesthesia Evaluation and Assessment    Patient: Marla Gomez MRN: [id number]  SSN: [id number]   YOB: 1975  Age: 57 y.o. Sex: female          Cardiovascular Function/Vital Signs    /65   Pulse 74   Temp 36.7 °C (98 °F)   Resp 16   Ht 5' 9\" (1.753 m)   Wt (!) 160.8 kg (354 lb 8 oz)   SpO2 95%   BMI 52.35 kg/m²     Patient is status post Procedure(s):  RIGHT CARPAL TUNNEL RELEASE (MAC WITH LOCAL). Nausea/Vomiting: Controlled. Postoperative hydration reviewed and adequate. Pain:  Pain Scale 1: Numeric (0 - 10) (03/20/23 0945)  Pain Intensity 1: 0 (03/20/23 0945)   Managed. Neurological Status:   Neuro (WDL): Exceptions to WDL (03/20/23 0945)   At baseline. Mental Status and Level of Consciousness: Arousable. Pulmonary Status:   O2 Device: None (Room air) (03/20/23 0930)   Adequate oxygenation and airway patent. Complications related to anesthesia: None    Post-anesthesia assessment completed. No concerns. I have evaluated the patient and the patient is stable and ready to be discharged from PACU . Signed By: Liana Soria MD    3/20/2023        INITIAL Post-op Vital signs:   Vitals Value Taken Time   /70 03/20/23 0930   Temp 36.7 °C (98 °F) 03/20/23 0900   Pulse 63 03/20/23 0934   Resp 13 03/20/23 0934   SpO2 92 % 03/20/23 0934   Vitals shown include unvalidated device data.

## 2023-03-20 NOTE — DISCHARGE SUMMARY
Date of Admission: 3/20/2023  Date of Discharge: 3/20/2023    Attending on admission and discharge: Dr. Jeferson Ruth    Admitting Diagnosis: right carpal tunnel syndrome  Discharge Diagnosis: same  Procedure Performed: right carpal tunnel release    Hospital Course and Treatment:     The patient was admitted through the preop holding area. The patient tolerated the procedure well and was taken to the pacu for further observation and treatment. The patient was maintained on IV fluids until tolerating a PO diet. They were also maintained on sequential compression devices and DVT prophylaxis. The diet was advanced as tolerated. The patient was mobilized. There were no complications during the course of the hospital stay, and the patient was deemed medically stable for discharge to home. After consultation with physical therapy, the patient was cleared for discharge. Discharge instructions: The patient should not be in a pool or tub, or otherwise immerse the wound in water. The patient has been counseled on the importance of mobilizing and moving at least every two hours to help prevent blood clots. The patient should call Dr. Zaida Garsia office or go to an emergency department if they note a fever over 101.1 degrees fahrenheit, more or unrelieved pain, more or new swelling at the operative site, or any drainage from the wound. Condition at Discharge: Stable    Discharge medications:  Resume regular home medications  Additional medications to be prescribed on discharge    tramadol      Follow up instructions: The patient should follow up in 2  weeks for a wound check and xrays with Dr. Sharmila Owusu.

## 2023-03-20 NOTE — PERIOP NOTES
0900-Handoff Report from Operating Room to PACU    Report received from Singh Giordano CRNA regarding [employer]. Surgeon(s):  Kalyan CAMPOS DO  And Procedure(s) (LRB):  RIGHT CARPAL TUNNEL RELEASE (MAC WITH LOCAL) (Right)  confirmed   with allergies and dressings discussed. Anesthesia type, drugs, patient history, complications, estimated blood loss, vital signs, intake and output, and last pain medication, lines, reversal medications, and temperature were reviewed. 0930-TRANSFER - OUT REPORT:    Verbal report given to Kizzy RN(name) on [employer]  being transferred to phase II(unit) for routine post - op       Report consisted of patients Situation, Background, Assessment and   Recommendations(SBAR). Information from the following report(s) SBAR, Kardex, OR Summary, Procedure Summary, Intake/Output, MAR, and Recent Results was reviewed with the receiving nurse. Opportunity for questions and clarification was provided.       Patient transported with:   Registered Nurse

## 2023-03-20 NOTE — ANESTHESIA PREPROCEDURE EVALUATION
Relevant Problems   NEUROLOGY   (+) Depression   (+) Severe recurrent major depression without psychotic features (HCC)      CARDIOVASCULAR   (+) PVC's (premature ventricular contractions)      GASTROINTESTINAL   (+) GERD (gastroesophageal reflux disease)      ENDOCRINE   (+) Class 3 severe obesity due to excess calories with serious comorbidity and body mass index (BMI) of 50.0 to 59.9 in adult [facility])   (+) Type 2 diabetes mellitus with hyperglycemia, with long-term current use of insulin (HCC)      HEMATOLOGY   (+) Anemia       Anesthetic History   No history of anesthetic complications  PONV          Review of Systems / Medical History  Patient summary reviewed, nursing notes reviewed and pertinent labs reviewed    Pulmonary  Within defined limits                 Neuro/Psych   Within defined limits      Psychiatric history     Cardiovascular  Within defined limits          Dysrhythmias   Hyperlipidemia    Exercise tolerance: >4 METS     GI/Hepatic/Renal  Within defined limits   GERD           Endo/Other  Within defined limits  Diabetes    Morbid obesity     Other Findings   Comments: CTS (carpal tunnel syndrome)           Physical Exam    Airway  Mallampati: II  TM Distance: > 6 cm  Neck ROM: normal range of motion   Mouth opening: Normal     Cardiovascular  Regular rate and rhythm,  S1 and S2 normal,  no murmur, click, rub, or gallop             Dental  No notable dental hx       Pulmonary  Breath sounds clear to auscultation               Abdominal  GI exam deferred       Other Findings            Anesthetic Plan    ASA: 2  Anesthesia type: MAC          Induction: Intravenous  Anesthetic plan and risks discussed with: Patient

## 2023-03-20 NOTE — DISCHARGE INSTRUCTIONS
Carpal Tunnel Release: If you have specific questions: CALL OUR OFFICE: [phone]    DRESSING:  Keep dressing on, clean, and dry for three days. Three days after your surgery, you may remove the dressing and wash hand with soap and water. You are not allowed to place this underwater, and once it is cleaned, you should put an occlusive dressing over the wound. ACTIVITY:  Keep the operative hand elevated as much as possible for the next two weeks    It is ok to begin to wiggle the fingers, move the hand and wrist to your tolerance, but do not do any lifting more than food or basic hygiene. GENERAL PAIN CONTROL:    IF YOU HAVE HAD A NERVE BLOCK: remember, you will have an increase in pain sometime in the first 24 hours after your surgery. This can be significant, make sure you are consistently medicating and expect that increase in pain. This is normal, but will require you to be proactive in your pain control. If this increase in pain persists - call Dr. Julia Borrero office. Take pain medications as needed and prescribed. Never exceed the maximum dosage. It is reasonable to take something for pain prior to night time on the first night to avoid severe pain in the night. Ice is an important part of your recovery, and best if you do not apply ice or ice pack directly to skin, but use a towel or cloth on your skin. Do this for twenty minutes on the skin, then at least twenty minutes off of your skin. General Postoperative Instructions: If you have specific questions: CALL OUR OFFICE: [phone]    Diet: It is OK to resume your regular diet postoperatively. Start with light liquids and then slowly increase what you eat to avoid postoperative nausea and vomiting. If you do experience nausea, restrict fatty or greasy foods until this passes. If you have continued issues, please call our office number for help: [phone].     Dressings: In general, dressings should remain dry and clean. In many cases, these should stay on until the postoperative visit, unless you've been directed to do so otherwise. Additionally, if the dressing becomes soiled or bloody, please call our office for further instructions. Ice instructions:  Ice is important for pain relief, but can be dangerous if improperly applied. Always make sure that NO ice has direct contact with the skin, and make sure that it never stays on the affected area more than 20 minutes. It is important to have ice off of the area for more than 20 minutes after a session to allow the skin to normalize temperature. It is ok to ice through casts, splints, and dressings, and can still be helpful. Weight bearing: Please refer to the specific surgical weight bearing instructions. Please do not exceed the weight limits as prescribed, as this could cause a surgical complication, wound healing problem, or other issue that could otherwise be avoided. Warning signs: The patient should call Dr. Sydney Martinez office or go to an emergency department if they note a fever over 101.1 degrees fahrenheit, more or unrelieved pain, more or new swelling at the operative site, discoloration of the extremity any drainage from the wound.   DISCHARGE SUMMARY from Nurse    PATIENT INSTRUCTIONS:    After general anesthesia or intravenous sedation, for 24 hours or while taking prescription narcotics:    Have someone responsible help you with your care  Limit your activities  Do not drive and operate hazardous machinery  Do not make important personal, legal or business decisions  Do not drink alcoholic beverages  If you have not urinated within 8 hours after discharge, please contact your surgeon on call  Resume your medications unless otherwise instructed    From general anesthesia, intravenous sedation, or while taking prescription narcotics, you may experience:    Drowsiness, dizziness, sleepiness, or confusion  Difficulty remembering or delayed reaction times  Difficulty with your balance, especially while walking, move slowly and carefully, do not make sudden position changes  Difficulty focusing or blurred vision    You may not be aware of slight changes in your behavior and/or your reaction time because of the medication used during and after your procedure. Report the following to your surgeon:  Excessive pain, swelling, redness or odor of or around the surgical area  Temperature over 100.5  Nausea and vomiting lasting longer than 4 hours or if unable to take medications  Any signs of decreased circulation or nerve impairment to extremity: change in color, persistent numbness, tingling, coldness or increase pain  Any questions or concerns         IF YOU REPORT TO AN EMERGENCY ROOM, DOCTOR'S OFFICE OR HOSPITAL WITHIN 24 HOURS AFTER YOUR PROCEDURE, BRING THIS SHEET AND YOUR AFTER VISIT SUMMARY WITH YOU AND GIVE IT TO THE PHYSICIAN OR NURSE ATTENDING YOU. These are general instructions for a healthy lifestyle (if applicable): No smoking/ No tobacco products/ Avoid exposure to secondhand smoke  Surgeon General's Warning:  Quitting smoking now greatly reduces serious risk to your health. Obesity, smoking, and sedentary lifestyle greatly increases your risk for illness    A healthy diet, regular physical exercise & weight monitoring are important for maintaining a healthy lifestyle    You may be retaining fluid if you have a history of heart failure or if you experience any of the following symptoms:  Weight gain of 3 pounds or more overnight or 5 pounds in a week, increased swelling in our hands or feet or shortness of breath while lying flat in bed. Please call your doctor as soon as you notice any of these symptoms; do not wait until your next office visit. A common side effect of anesthesia following surgery is nausea and/or vomiting.  In order to decrease symptoms, it is wise to avoid foods that are high in fat, greasy foods, milk products, and spicy foods for the first 24 hours. Acceptable foods for the first 24 hours following surgery include but are not limited to:    soup  broth  toast   crackers   applesauce  bananas   mashed potatoes,  soft or scrambled eggs  oatmeal  jello    It is important to eat when taking your pain medication. This will help to prevent nausea. If possible, please try to time your meals with your medications. It is very important to stay hydrated following surgery. Sip fluids frequently while awake. Avoid acidic drinks such as citrus juices and soda for 24 hours. Carbonated beverages may cause bloating and gas. Acceptable fluids include:    water (flavor packets may add variety)  coffee or tea (in moderation)  Gatorade  Sundar-Aid  apple juice  cranberry juice    You are encouraged to cough and deep breathe every hour when awake. This will help to prevent respiratory complications following anesthesia. You may want to hug a pillow when coughing and sneezing to add additional support to the surgical area and to decrease discomfort if you had abdominal or chest surgery. If you are discharged home with support stockings, you may remove them after 24 hours. Support stockings are used to help prevent blood clots in the legs following surgery. TO PREVENT AN INFECTION      [address] YOUR HANDS    To prevent infection, good handwashing is the most important thing you or your caregiver can do. Wash your hands with soap and water or use the hand  we gave you before you touch any wounds. SHOWER    Use the antibacterial soap we gave you when you take a shower. Shower with this soap until your wounds are healed. To reach all areas of your body, you may need someone to help you. Dont forget to clean your belly button with every shower. USE CLEAN SHEETS    Use freshly cleaned sheets on your bed after surgery.      To keep the surgery site clean, do not allow pets to sleep with you while your wound is still healing. STOP SMOKING    Stop smoking, or at least cut back on smoking    Smoking slows your healing. CONTROL YOUR BLOOD SUGAR    High blood sugars slow wound healing. If you are diabetic, control your blood sugar levels before and after your surgery. Please take time to review all of your Home Care Instructions and Medication Information sheets provided in your discharge packet. If you have any questions, please contact your surgeon's office. Thank you. The discharge information has been reviewed with the patient and instruction recipient. The patient and instruction recipient verbalized understanding. Discharge medications reviewed with the patient and instruction recipient and appropriate educational materials and side effects teaching were provided. Please provide this summary of care documentation to your next provider.

## 2023-03-23 ENCOUNTER — OFFICE VISIT (OUTPATIENT)
Dept: ORTHOPEDIC SURGERY | Age: 48
End: 2023-03-23

## 2023-03-23 VITALS
BODY MASS INDEX: 43.4 KG/M2 | DIASTOLIC BLOOD PRESSURE: 89 MMHG | HEART RATE: 79 BPM | SYSTOLIC BLOOD PRESSURE: 152 MMHG | WEIGHT: 293 LBS | HEIGHT: 69 IN | TEMPERATURE: 98 F | OXYGEN SATURATION: 95 %

## 2023-03-23 DIAGNOSIS — M17.11 UNILATERAL PRIMARY OSTEOARTHRITIS, RIGHT KNEE: Primary | ICD-10-CM

## 2023-03-23 RX ORDER — HYALURONATE SODIUM 10 MG/ML
25 SYRINGE (ML) INTRAARTICULAR ONCE
Status: COMPLETED | OUTPATIENT
Start: 2023-03-23 | End: 2023-03-23

## 2023-03-23 RX ADMIN — Medication 25 MG: at 14:30

## 2023-03-23 NOTE — PROGRESS NOTES
Date of Procedure: 3/23/2023  PROCEDURE NOTE: Right knee injection of visco 3    Consent was obtained from the patient. The correct site was identified after confirmation with the patient. Following identification and confirmation of the correct site with the patient, the superolateral right knee was prepped in the normal sterile fashion. A local anesthetic of 1% lidocaine without epinephrine was then administered to the local tissues. Following, an injection of visco 3 25 mg viscosupplementation prefilled syringe was administered to the right knee. The needle was then withdrawn and the injection site dressed with a sterile bandage at the conclusion. The procedure was well tolerated by the patient. No immediate adverse reactions were noted. Post injection instructions were given.

## 2023-03-23 NOTE — TELEPHONE ENCOUNTER
Patient stated that she has received a phone call from [facility] requesting this paperwork be sent out to them as soon as possible.     The contact number is [phone]

## 2023-03-23 NOTE — PROGRESS NOTES
Identified pt with two pt identifiers (name and ). Reviewed chart in preparation for visit and have obtained necessary documentation. Jayant Carlisle is a 57 y.o. female  Chief Complaint   Patient presents with    Joint Or Tendon Injection     Rt Knee     Visit Vitals  BP (!) 152/89 (BP 1 Location: Right upper arm, BP Patient Position: Sitting, BP Cuff Size: Large adult)   Pulse 79   Temp 98 °F (36.7 °C)   Ht 5' 9\" (1.753 m)   Wt (!) 358 lb 6.4 oz (162.6 kg)   SpO2 95%   BMI 52.93 kg/m²     1. Have you been to the ER, urgent care clinic since your last visit? Hospitalized since your last visit? No    2. Have you seen or consulted any other health care providers outside of the [address] [place] [address] since your last visit? Include any pap smears or colon screening. No  Patient and provider made aware of elevated BP x2. Patient asymptomatic. Patient reminded to monitor BP, continue to take BP medications if prescribed, and follow up with PCP/Cardiologist.  Patient expressed understanding and agreement.    Patient refused 2x

## 2023-03-31 ENCOUNTER — OFFICE VISIT (OUTPATIENT)
Dept: ORTHOPEDIC SURGERY | Age: 48
End: 2023-03-31
Payer: COMMERCIAL

## 2023-03-31 VITALS
DIASTOLIC BLOOD PRESSURE: 84 MMHG | HEIGHT: 69 IN | WEIGHT: 293 LBS | OXYGEN SATURATION: 96 % | SYSTOLIC BLOOD PRESSURE: 135 MMHG | BODY MASS INDEX: 43.4 KG/M2 | RESPIRATION RATE: 17 BRPM | TEMPERATURE: 98 F | HEART RATE: 77 BPM

## 2023-03-31 DIAGNOSIS — Z47.89 ORTHOPEDIC AFTERCARE: Primary | ICD-10-CM

## 2023-03-31 PROCEDURE — 99024 POSTOP FOLLOW-UP VISIT: CPT | Performed by: ORTHOPAEDIC SURGERY

## 2023-03-31 NOTE — PROGRESS NOTES
Identified pt with two pt identifiers (name and ). Reviewed chart in preparation for visit and have obtained necessary documentation. Betsy Castro is a 57 y.o. female  Chief Complaint   Patient presents with    Surgical Follow-up     Rt Carpal Tunnel      Visit Vitals  /84 (BP 1 Location: Right upper arm, BP Patient Position: Sitting, BP Cuff Size: Large adult)   Pulse 77   Temp 98 °F (36.7 °C) (Tympanic)   Resp 17   Ht 5' 9\" (1.753 m)   Wt (!) 358 lb (162.4 kg)   SpO2 96%   BMI 52.87 kg/m²     1. Have you been to the ER, urgent care clinic since your last visit? Hospitalized since your last visit? No    2. Have you seen or consulted any other health care providers outside of the [address] [place] [address] since your last visit? Include any pap smears or colon screening.  No

## 2023-04-01 NOTE — PROGRESS NOTES
is here for a follow up visit from a right carpal tunnel release. Pain has been appropriate since surgery, no major medical complications since surgery. Current Outpatient Medications on File Prior to Visit   Medication Sig Dispense Refill    HumaLOG KwikPen Insulin 100 unit/mL kwikpen INJECT 10 UNITS UNDER THE SKIN THREE TIMES A DAY WITH MEALS with sliding scale, max dose of 50 units per day 45 mL 3    diclofenac EC (VOLTAREN) 75 mg EC tablet Take 1 Tablet by mouth two (2) times a day. 60 Tablet 0    sertraline (Zoloft) 50 mg tablet Take 1 Tablet by mouth daily. 30 Tablet 0    Dexcom G6 Transmitter jessica For use with DEXCOM SENSOR  E11.65 1 Each 3    Dexcom G6  misc Use as directed  E11.65 1 Each 0    Dexcom G6 Sensor jessica Use as directed every 10 days E11.65 3 Each 11    Lantus Solostar U-100 Insulin 100 unit/mL (3 mL) inpn INJECT 50 UNITS UNDER THE SKIN DAILY AT BEDTIME 30 mL 5    glipiZIDE SR (GLUCOTROL XL) 10 mg CR tablet Take 2 Tablets by mouth daily. 60 Tablet 3    semaglutide (Ozempic) 1 mg/dose (4 mg/3 mL) pnij 1 mg by SubCUTAneous route every seven (7) days. 1 Each 3    Insulin Needles, Disposable, (BD Ultra-Fine Short Pen Needle) 31 gauge x 5/16\" ndle USE ONE PEN NEEDLE EVERY 7 DAYS OR AS DIRECTED E11.65 100 Each 4    FreeStyle Dominik 2 Sensor kit E11.65   Use 2 sensors per month with reader (Patient taking differently: E11.65   Use 2 sensors per month with reader) 2 Kit 12    medroxyPROGESTERone (PROVERA) 10 mg tablet Take 1 Tablet by mouth daily. Take 10 days/month. 30 Tablet 4    Blood-Glucose Meter monitoring kit Please take readings 4 times a day, before breakfast, lunch, dinner and at bedtime 1 Kit 0    glucose blood VI test strips (Glucocom Glucose) strip TID AC, QHs 100 Strip 0    lancets-blood glucose strips 30 gauge cmpk 1 Package by Does Not Apply route Before breakfast, lunch, dinner and at bedtime.  1 Dose Pack 0     No current facility-administered medications on file prior to visit. ROS:  General: denies agitation, major chest pain, unexpected weakness  Patient states improving pain, no issues   Skin: healing wound is without issue or drainage   Strength: appropriate weakness of involved extremity is resolving since surgery      Physical Examination:    Blood pressure 135/84, pulse 77, temperature 98 °F (36.7 °C), temperature source Tympanic, resp. rate 17, height 5' 9\" (1.753 m), weight (!) 358 lb (162.4 kg), SpO2 96 %. Right wrist:    Dressing: none  Skin: clean, dry, intact  Sensation intact to light touch at level of wound and distally  Strength is not tested  Range of motion is slightly limited, but improving   Distal swelling is noted, but appropriate for postoperative course  Distal capillary refill less than 2 seconds      Imaging:    Postoperative imaging: none      Assessment: Status post right carpal tunnel release    Plan:  Patient will continue range of motion physical therapy  Wound care was reviewed  Weightbearing will be full through the wrist  Deep Venous Thrombosis Prophylaxis: none  We discussed pain control measures, the importance of wound care and maintenance.      Follow up will be at 4 weeks from now,  no xrays on follow up

## 2023-04-17 DIAGNOSIS — Z79.4 TYPE 2 DIABETES MELLITUS WITH HYPERGLYCEMIA, WITH LONG-TERM CURRENT USE OF INSULIN (HCC): ICD-10-CM

## 2023-04-17 DIAGNOSIS — E11.9 TYPE 2 DIABETES MELLITUS WITHOUT COMPLICATION, WITHOUT LONG-TERM CURRENT USE OF INSULIN (HCC): ICD-10-CM

## 2023-04-17 DIAGNOSIS — E11.65 TYPE 2 DIABETES MELLITUS WITH HYPERGLYCEMIA, WITH LONG-TERM CURRENT USE OF INSULIN (HCC): ICD-10-CM

## 2023-04-17 NOTE — TELEPHONE ENCOUNTER
A  rep from [address] [place] [address] called and left a message requesting 90 day supply for the following  3 medications. She stated that Ms. Hawa Winslow will have to go through them for her refills.

## 2023-04-18 RX ORDER — GLIPIZIDE 10 MG/1
TABLET, FILM COATED, EXTENDED RELEASE ORAL
Qty: 180 TABLET | Refills: 3 | Status: SHIPPED | OUTPATIENT
Start: 2023-04-18

## 2023-04-18 RX ORDER — INSULIN GLARGINE 100 [IU]/ML
INJECTION, SOLUTION SUBCUTANEOUS
Qty: 45 ML | Refills: 5 | Status: SHIPPED | OUTPATIENT
Start: 2023-04-18

## 2023-04-18 RX ORDER — INSULIN LISPRO 100 [IU]/ML
INJECTION, SOLUTION INTRAVENOUS; SUBCUTANEOUS
Qty: 45 ML | Refills: 5 | Status: SHIPPED | OUTPATIENT
Start: 2023-04-18

## 2023-04-28 ENCOUNTER — OFFICE VISIT (OUTPATIENT)
Dept: ORTHOPEDIC SURGERY | Age: 48
End: 2023-04-28
Payer: COMMERCIAL

## 2023-04-28 VITALS
RESPIRATION RATE: 17 BRPM | TEMPERATURE: 97.4 F | OXYGEN SATURATION: 96 % | WEIGHT: 293 LBS | SYSTOLIC BLOOD PRESSURE: 136 MMHG | BODY MASS INDEX: 43.4 KG/M2 | HEIGHT: 69 IN | DIASTOLIC BLOOD PRESSURE: 92 MMHG | HEART RATE: 82 BPM

## 2023-04-28 DIAGNOSIS — M17.11 UNILATERAL PRIMARY OSTEOARTHRITIS, RIGHT KNEE: Primary | ICD-10-CM

## 2023-04-28 DIAGNOSIS — G56.01 CARPAL TUNNEL SYNDROME ON RIGHT: ICD-10-CM

## 2023-04-28 PROCEDURE — 99024 POSTOP FOLLOW-UP VISIT: CPT | Performed by: ORTHOPAEDIC SURGERY

## 2023-04-28 NOTE — PROGRESS NOTES
Identified pt with two pt identifiers (name and ). Reviewed chart in preparation for visit and have obtained necessary documentation. Bere Joshi is a 57 y.o. female  Chief Complaint   Patient presents with    Surgical Follow-up     Rt Wrist     Visit Vitals  BP (!) 136/92 (BP 1 Location: Right upper arm, BP Patient Position: Sitting, BP Cuff Size: Large adult)   Pulse 82   Temp 97.4 °F (36.3 °C) (Tympanic)   Resp 17   Ht 5' 9\" (1.753 m)   Wt (!) 358 lb (162.4 kg)   SpO2 96%   BMI 52.87 kg/m²     1. Have you been to the ER, urgent care clinic since your last visit? Hospitalized since your last visit? No    2. Have you seen or consulted any other health care providers outside of the [address] [place] [address] since your last visit? Include any pap smears or colon screening.  No

## 2023-04-28 NOTE — PROGRESS NOTES
is here for a follow up visit from a right carpal tunnel release. Pain has been appropriate since surgery, no major medical complications since surgery. Current Outpatient Medications on File Prior to Visit   Medication Sig Dispense Refill    HumaLOG KwikPen Insulin 100 unit/mL kwikpen INJECT 10 UNITS UNDER THE SKIN THREE TIMES A DAY WITH MEALS with sliding scale, max dose of 50 units per day 45 mL 5    Lantus Solostar U-100 Insulin 100 unit/mL (3 mL) inpn INJECT 50 UNITS UNDER THE SKIN DAILY AT BEDTIME 45 mL 5    glipiZIDE SR (GLUCOTROL XL) 10 mg CR tablet TAKE TWO TABLETS BY MOUTH DAILY 180 Tablet 3    diclofenac EC (VOLTAREN) 75 mg EC tablet Take 1 Tablet by mouth two (2) times a day. 60 Tablet 0    sertraline (Zoloft) 50 mg tablet Take 1 Tablet by mouth daily. 30 Tablet 0    Dexcom G6 Transmitter jessica For use with DEXCOM SENSOR  E11.65 1 Each 3    Dexcom G6  misc Use as directed  E11.65 1 Each 0    Dexcom G6 Sensor jessica Use as directed every 10 days E11.65 3 Each 11    semaglutide (Ozempic) 1 mg/dose (4 mg/3 mL) pnij 1 mg by SubCUTAneous route every seven (7) days. 1 Each 3    Insulin Needles, Disposable, (BD Ultra-Fine Short Pen Needle) 31 gauge x 5/16\" ndle USE ONE PEN NEEDLE EVERY 7 DAYS OR AS DIRECTED E11.65 100 Each 4    FreeStyle Dominik 2 Sensor kit E11.65   Use 2 sensors per month with reader (Patient taking differently: E11.65   Use 2 sensors per month with reader) 2 Kit 12    medroxyPROGESTERone (PROVERA) 10 mg tablet Take 1 Tablet by mouth daily. Take 10 days/month. 30 Tablet 4    Blood-Glucose Meter monitoring kit Please take readings 4 times a day, before breakfast, lunch, dinner and at bedtime 1 Kit 0    glucose blood VI test strips (Glucocom Glucose) strip TID AC, QHs 100 Strip 0    lancets-blood glucose strips 30 gauge cmpk 1 Package by Does Not Apply route Before breakfast, lunch, dinner and at bedtime.  1 Dose Pack 0     No current facility-administered medications on file prior to visit. ROS:  General: denies agitation, major chest pain, unexpected weakness  Patient states improving pain, no issues   Skin: healing wound is without issue or drainage   Strength: appropriate weakness of involved extremity is resolving since surgery      Physical Examination:    Blood pressure (!) 136/92, pulse 82, temperature 97.4 °F (36.3 °C), temperature source Tympanic, resp. rate 17, height 5' 9\" (1.753 m), weight (!) 358 lb (162.4 kg), SpO2 96 %. Right wrist:    Dressing: none  Skin: clean, dry, intact  Sensation intact to light touch at level of wound and distally  Strength is not tested  Range of motion is slightly limited, but improving   Distal swelling is noted, but appropriate for postoperative course  Distal capillary refill less than 2 seconds      Imaging:    Postoperative imaging: none      Assessment: Status post right carpal tunnel release    Plan:  Patient will continue range of motion physical therapy  Wound care was reviewed  Weightbearing will be full through the wrist  Deep Venous Thrombosis Prophylaxis: none  We discussed pain control measures, the importance of wound care and maintenance.      Follow up will be prn

## 2023-05-31 ENCOUNTER — HOSPITAL ENCOUNTER (EMERGENCY)
Facility: HOSPITAL | Age: 48
Discharge: LEFT BEFORE DISCHARGE | End: 2023-05-31
Attending: EMERGENCY MEDICINE
Payer: COMMERCIAL

## 2023-05-31 VITALS
SYSTOLIC BLOOD PRESSURE: 181 MMHG | HEIGHT: 69 IN | BODY MASS INDEX: 43.4 KG/M2 | TEMPERATURE: 97.7 F | DIASTOLIC BLOOD PRESSURE: 88 MMHG | OXYGEN SATURATION: 99 % | WEIGHT: 293 LBS | HEART RATE: 89 BPM | RESPIRATION RATE: 18 BRPM

## 2023-05-31 LAB
ALBUMIN SERPL-MCNC: 3.7 G/DL (ref 3.5–5)
ALBUMIN/GLOB SERPL: 0.9 (ref 1.1–2.2)
ALP SERPL-CCNC: 85 U/L (ref 45–117)
ALT SERPL-CCNC: 29 U/L (ref 12–78)
ANION GAP SERPL CALC-SCNC: 5 MMOL/L (ref 5–15)
AST SERPL-CCNC: 15 U/L (ref 15–37)
BASOPHILS # BLD: 0 K/UL (ref 0–0.1)
BASOPHILS NFR BLD: 0 % (ref 0–1)
BILIRUB SERPL-MCNC: 0.3 MG/DL (ref 0.2–1)
BUN SERPL-MCNC: 16 MG/DL (ref 6–20)
BUN/CREAT SERPL: 17 (ref 12–20)
CALCIUM SERPL-MCNC: 8.9 MG/DL (ref 8.5–10.1)
CHLORIDE SERPL-SCNC: 105 MMOL/L (ref 97–108)
CO2 SERPL-SCNC: 28 MMOL/L (ref 21–32)
COMMENT:: NORMAL
CREAT SERPL-MCNC: 0.96 MG/DL (ref 0.55–1.02)
DIFFERENTIAL METHOD BLD: ABNORMAL
EOSINOPHIL # BLD: 0.1 K/UL (ref 0–0.4)
EOSINOPHIL NFR BLD: 2 % (ref 0–7)
ERYTHROCYTE [DISTWIDTH] IN BLOOD BY AUTOMATED COUNT: 13.4 % (ref 11.5–14.5)
GLOBULIN SER CALC-MCNC: 4 G/DL (ref 2–4)
GLUCOSE SERPL-MCNC: 116 MG/DL (ref 65–100)
HCT VFR BLD AUTO: 42.9 % (ref 35–47)
HGB BLD-MCNC: 14.3 G/DL (ref 11.5–16)
IMM GRANULOCYTES # BLD AUTO: 0.1 K/UL (ref 0–0.04)
IMM GRANULOCYTES NFR BLD AUTO: 1 % (ref 0–0.5)
LYMPHOCYTES # BLD: 2.1 K/UL (ref 0.8–3.5)
LYMPHOCYTES NFR BLD: 26 % (ref 12–49)
MCH RBC QN AUTO: 28.9 PG (ref 26–34)
MCHC RBC AUTO-ENTMCNC: 33.3 G/DL (ref 30–36.5)
MCV RBC AUTO: 86.7 FL (ref 80–99)
MONOCYTES # BLD: 0.5 K/UL (ref 0–1)
MONOCYTES NFR BLD: 6 % (ref 5–13)
NEUTS SEG # BLD: 5.3 K/UL (ref 1.8–8)
NEUTS SEG NFR BLD: 65 % (ref 32–75)
NRBC # BLD: 0 K/UL (ref 0–0.01)
NRBC BLD-RTO: 0 PER 100 WBC
PLATELET # BLD AUTO: 235 K/UL (ref 150–400)
PMV BLD AUTO: 10.5 FL (ref 8.9–12.9)
POTASSIUM SERPL-SCNC: 3.8 MMOL/L (ref 3.5–5.1)
PROT SERPL-MCNC: 7.7 G/DL (ref 6.4–8.2)
RBC # BLD AUTO: 4.95 M/UL (ref 3.8–5.2)
SODIUM SERPL-SCNC: 138 MMOL/L (ref 136–145)
SPECIMEN HOLD: NORMAL
TROPONIN I SERPL HS-MCNC: <4 NG/L (ref 0–51)
WBC # BLD AUTO: 8 K/UL (ref 3.6–11)

## 2023-05-31 PROCEDURE — 85025 COMPLETE CBC W/AUTO DIFF WBC: CPT

## 2023-05-31 PROCEDURE — 93005 ELECTROCARDIOGRAM TRACING: CPT | Performed by: EMERGENCY MEDICINE

## 2023-05-31 PROCEDURE — 84484 ASSAY OF TROPONIN QUANT: CPT

## 2023-05-31 PROCEDURE — 4500000002 HC ER NO CHARGE

## 2023-05-31 PROCEDURE — 36415 COLL VENOUS BLD VENIPUNCTURE: CPT

## 2023-05-31 PROCEDURE — 80053 COMPREHEN METABOLIC PANEL: CPT

## 2023-05-31 ASSESSMENT — PAIN - FUNCTIONAL ASSESSMENT: PAIN_FUNCTIONAL_ASSESSMENT: 0-10

## 2023-05-31 ASSESSMENT — PAIN SCALES - GENERAL: PAINLEVEL_OUTOF10: 8

## 2023-06-01 ENCOUNTER — CARE COORDINATION (OUTPATIENT)
Dept: OTHER | Facility: CLINIC | Age: 48
End: 2023-06-01

## 2023-06-01 NOTE — CARE COORDINATION
Ambulatory Care Coordination Note    ACM attempted to reach patient for introduction to Associate Care Management related to [facility] ER 5/31/23 Left without being seen Diagnosis: CP. HIPAA compliant message left requesting a return phone call at patient convenience. Plan for follow-up call in 1-2 days      No future appointments.

## 2023-06-02 ENCOUNTER — CARE COORDINATION (OUTPATIENT)
Dept: OTHER | Facility: CLINIC | Age: 48
End: 2023-06-02

## 2023-06-02 LAB
EKG ATRIAL RATE: 87 BPM
EKG DIAGNOSIS: NORMAL
EKG P AXIS: 43 DEGREES
EKG P-R INTERVAL: 116 MS
EKG Q-T INTERVAL: 370 MS
EKG QRS DURATION: 90 MS
EKG QTC CALCULATION (BAZETT): 445 MS
EKG R AXIS: 76 DEGREES
EKG T AXIS: 80 DEGREES
EKG VENTRICULAR RATE: 87 BPM

## 2023-06-05 ENCOUNTER — CARE COORDINATION (OUTPATIENT)
Dept: OTHER | Facility: CLINIC | Age: 48
End: 2023-06-05

## 2023-06-05 NOTE — CARE COORDINATION
resources in place to maintain patient in the community (ie., home health, equipment, DME, refer to, etc.)       [facility] - # [phone]  [employer]   *Go to [email or web address] to create an account. Your copay is $15   Nurse Access line 24/7 located on the back of the insurance card  [employer] online   [address] Urgent [facility] # [phone]  HR Service Now - [facility] Workday  IT - [phone]  MyChart Help - # 1- [phone]  Leave of absence # [phone] option 1 or call the dedicated line at [address] [place] [address] ([phone]). [employer] agents are available weekdays 8:30 a.m. - 10:30 p.m. ET. [employer] - [phone] Go to [employer] password [email or web address] to access resources, educational information, and self-service options.               Dai Zavala RN, [place] [address] [address]  [id number]  [facility] [phone] Fax [email or web address]

## 2023-06-23 ENCOUNTER — TELEMEDICINE (OUTPATIENT)
Facility: CLINIC | Age: 48
End: 2023-06-23
Payer: COMMERCIAL

## 2023-06-23 DIAGNOSIS — R45.851 SUICIDAL IDEATIONS: ICD-10-CM

## 2023-06-23 DIAGNOSIS — F41.9 ANXIETY: ICD-10-CM

## 2023-06-23 DIAGNOSIS — F41.0 PANIC ATTACKS: ICD-10-CM

## 2023-06-23 DIAGNOSIS — F33.2 SEVERE RECURRENT MAJOR DEPRESSION WITHOUT PSYCHOTIC FEATURES (HCC): Primary | ICD-10-CM

## 2023-06-23 PROCEDURE — 99214 OFFICE O/P EST MOD 30 MIN: CPT | Performed by: FAMILY MEDICINE

## 2023-06-23 RX ORDER — ALPRAZOLAM 0.5 MG/1
0.5 TABLET ORAL 3 TIMES DAILY PRN
Qty: 4 TABLET | Refills: 0 | Status: SHIPPED | OUTPATIENT
Start: 2023-06-23 | End: 2023-07-23

## 2023-06-23 ASSESSMENT — PATIENT HEALTH QUESTIONNAIRE - PHQ9
SUM OF ALL RESPONSES TO PHQ QUESTIONS 1-9: 2
SUM OF ALL RESPONSES TO PHQ QUESTIONS 1-9: 2
SUM OF ALL RESPONSES TO PHQ9 QUESTIONS 1 & 2: 2
2. FEELING DOWN, DEPRESSED OR HOPELESS: 1
SUM OF ALL RESPONSES TO PHQ QUESTIONS 1-9: 2
SUM OF ALL RESPONSES TO PHQ QUESTIONS 1-9: 2
1. LITTLE INTEREST OR PLEASURE IN DOING THINGS: 1

## 2023-06-23 ASSESSMENT — ENCOUNTER SYMPTOMS: SHORTNESS OF BREATH: 1

## 2023-06-23 NOTE — PROGRESS NOTES
Chief Complaint   Patient presents with    Depression     Follow up. 1. Have you been to the ER, urgent care clinic since your last visit? Hospitalized since your last visit? Yes, 05/31/23 [facility], Chest pain. 2. Have you seen or consulted any other health care providers outside of the [address] [place] [address] since your last visit? Include any pap smears or colon screening.  No
Vitals  Patient-Reported Systolic (Top): 906 mmHg  Patient-Reported Diastolic (Bottom): 82 mmHg  Patient-Reported Pulse: 80  Patient-Reported Weight: 356lb       Physical Exam  Constitutional:       General: She is not in acute distress. Appearance: Normal appearance. She is not ill-appearing. Neurological:      Mental Status: She is alert and oriented to person, place, and time. Psychiatric:         Mood and Affect: Mood is depressed. Behavior: Behavior normal.         Thought Content: Thought content normal.         Judgment: Judgment normal.              Pastora Milan, was evaluated through a synchronous (real-time) audio-video encounter. The patient (or guardian if applicable) is aware that this is a billable service, which includes applicable co-pays. This Virtual Visit was conducted with patient's (and/or legal guardian's) consent. Patient identification was verified, and a caregiver was present when appropriate.    The patient was located at Other: at work in [place]  Provider was located at [facility] ([address]): Fito Welch MD

## 2023-06-28 ENCOUNTER — OFFICE VISIT (OUTPATIENT)
Facility: CLINIC | Age: 48
End: 2023-06-28
Payer: COMMERCIAL

## 2023-06-28 VITALS
HEART RATE: 91 BPM | RESPIRATION RATE: 20 BRPM | WEIGHT: 293 LBS | HEIGHT: 69 IN | DIASTOLIC BLOOD PRESSURE: 84 MMHG | SYSTOLIC BLOOD PRESSURE: 147 MMHG | TEMPERATURE: 97.7 F | BODY MASS INDEX: 43.4 KG/M2

## 2023-06-28 DIAGNOSIS — F41.9 ANXIETY: ICD-10-CM

## 2023-06-28 DIAGNOSIS — F32.A DEPRESSION, UNSPECIFIED DEPRESSION TYPE: Primary | ICD-10-CM

## 2023-06-28 PROCEDURE — 99213 OFFICE O/P EST LOW 20 MIN: CPT | Performed by: FAMILY MEDICINE

## 2023-06-28 ASSESSMENT — ENCOUNTER SYMPTOMS: SHORTNESS OF BREATH: 0

## 2023-06-30 ENCOUNTER — TELEPHONE (OUTPATIENT)
Facility: CLINIC | Age: 48
End: 2023-06-30

## 2023-06-30 ENCOUNTER — CARE COORDINATION (OUTPATIENT)
Dept: OTHER | Facility: CLINIC | Age: 48
End: 2023-06-30

## 2023-07-03 RX ORDER — SEMAGLUTIDE 1.34 MG/ML
INJECTION, SOLUTION SUBCUTANEOUS
Qty: 3 ML | Refills: 1 | Status: SHIPPED | OUTPATIENT
Start: 2023-07-03

## 2023-07-14 ENCOUNTER — CARE COORDINATION (OUTPATIENT)
Dept: OTHER | Facility: CLINIC | Age: 48
End: 2023-07-14

## 2023-07-21 ENCOUNTER — TELEMEDICINE (OUTPATIENT)
Facility: CLINIC | Age: 48
End: 2023-07-21
Payer: COMMERCIAL

## 2023-07-21 DIAGNOSIS — F41.9 ANXIETY: ICD-10-CM

## 2023-07-21 DIAGNOSIS — F41.0 PANIC ATTACKS: ICD-10-CM

## 2023-07-21 DIAGNOSIS — F33.2 SEVERE RECURRENT MAJOR DEPRESSION WITHOUT PSYCHOTIC FEATURES (HCC): ICD-10-CM

## 2023-07-21 PROCEDURE — 99214 OFFICE O/P EST MOD 30 MIN: CPT | Performed by: FAMILY MEDICINE

## 2023-07-21 RX ORDER — SERTRALINE HYDROCHLORIDE 100 MG/1
100 TABLET, FILM COATED ORAL DAILY
Qty: 30 TABLET | Refills: 0 | Status: SHIPPED | OUTPATIENT
Start: 2023-07-21

## 2023-07-21 SDOH — ECONOMIC STABILITY: FOOD INSECURITY: WITHIN THE PAST 12 MONTHS, THE FOOD YOU BOUGHT JUST DIDN'T LAST AND YOU DIDN'T HAVE MONEY TO GET MORE.: NEVER TRUE

## 2023-07-21 SDOH — ECONOMIC STABILITY: FOOD INSECURITY: WITHIN THE PAST 12 MONTHS, YOU WORRIED THAT YOUR FOOD WOULD RUN OUT BEFORE YOU GOT MONEY TO BUY MORE.: NEVER TRUE

## 2023-07-21 SDOH — ECONOMIC STABILITY: HOUSING INSECURITY
IN THE LAST 12 MONTHS, WAS THERE A TIME WHEN YOU DID NOT HAVE A STEADY PLACE TO SLEEP OR SLEPT IN A SHELTER (INCLUDING NOW)?: NO

## 2023-07-21 SDOH — ECONOMIC STABILITY: INCOME INSECURITY: HOW HARD IS IT FOR YOU TO PAY FOR THE VERY BASICS LIKE FOOD, HOUSING, MEDICAL CARE, AND HEATING?: NOT HARD AT ALL

## 2023-07-21 ASSESSMENT — ENCOUNTER SYMPTOMS: SHORTNESS OF BREATH: 0

## 2023-07-21 NOTE — PROGRESS NOTES
Chief Complaint   Patient presents with    Depression     Follow up. Patient states feeling better. 1. Have you been to the ER, urgent care clinic since your last visit? Hospitalized since your last visit? No    2. Have you seen or consulted any other health care providers outside of the [address] [place] [address] Avenue since your last visit? Include any pap smears or colon screening.  No

## 2023-07-21 NOTE — PROGRESS NOTES
Nelly Zheng (:  1975) is a Established patient, presenting virtually for evaluation of the following:    Assessment & Plan   Below is the assessment and plan developed based on review of pertinent history, physical exam, labs, studies, and medications. 1. Severe recurrent major depression without psychotic features (HCC)  -     sertraline (ZOLOFT) 100 MG tablet; Take 1 tablet by mouth daily, Disp-30 tablet, R-0Normal  2. Anxiety  -     sertraline (ZOLOFT) 100 MG tablet; Take 1 tablet by mouth daily, Disp-30 tablet, R-0Normal  3. Panic attacks  -     sertraline (ZOLOFT) 100 MG tablet; Take 1 tablet by mouth daily, Disp-30 tablet, R-0Normal    Doing much better  Increase Zoloft  I have told her it is OK to return to her old job    Return in about 3 weeks (around 2023) for Depression, Anxiety. Subjective   Patient presents with:  Depression: Follow up. Patient states feeling better. She also needs to follow up on her anxiety. This is also better (about 85%. Also, she is working at her new job and it is going well. She plans to work a few days a week at her old job as well. Review of Systems   Constitutional:  Positive for activity change, appetite change, fatigue and unexpected weight change. Her appetite has increased and she has gained weight. She has cut back. .  Her fatigue is only slight. Respiratory:  Negative for shortness of breath. Cardiovascular:  Positive for palpitations. Negative for chest pain. Genitourinary:  Positive for vaginal pain. Psychiatric/Behavioral:  Positive for decreased concentration and dysphoric mood. Negative for hallucinations, sleep disturbance and suicidal ideas. The patient is nervous/anxious. No HI        Objective   Patient-Reported Vitals  Patient-Reported Systolic (Top): 759 mmHg  Patient-Reported Diastolic (Bottom): 60 mmHg       Physical Exam  Constitutional:       General: She is not in acute distress.      Appearance:

## 2023-07-26 ENCOUNTER — TELEPHONE (OUTPATIENT)
Facility: CLINIC | Age: 48
End: 2023-07-26

## 2023-07-26 NOTE — TELEPHONE ENCOUNTER
FMLA Forms faxed by [employer] ( Leave Request # [id number]) for continuous leave 6/30/23 to 7/12/23 to be completed and faxed back to: [facility] [employer] at [phone]. Forms due within 21 days of date received (7/25/23) or pt's leave may be denied. Forms placed in Dr. Mio Kennedy in basket for review.  Renem

## 2023-07-28 ENCOUNTER — TELEPHONE (OUTPATIENT)
Facility: CLINIC | Age: 48
End: 2023-07-28

## 2023-08-21 DIAGNOSIS — F33.2 SEVERE RECURRENT MAJOR DEPRESSION WITHOUT PSYCHOTIC FEATURES (HCC): ICD-10-CM

## 2023-08-21 DIAGNOSIS — F41.0 PANIC ATTACKS: ICD-10-CM

## 2023-08-21 DIAGNOSIS — F41.9 ANXIETY: ICD-10-CM

## 2023-08-22 NOTE — TELEPHONE ENCOUNTER
Forms completed and placed in nursing box. She needs to complete and sign her portion prior to sending.

## 2023-08-25 ENCOUNTER — TELEMEDICINE (OUTPATIENT)
Facility: CLINIC | Age: 48
End: 2023-08-25
Payer: COMMERCIAL

## 2023-08-25 DIAGNOSIS — K21.9 GASTROESOPHAGEAL REFLUX DISEASE, UNSPECIFIED WHETHER ESOPHAGITIS PRESENT: ICD-10-CM

## 2023-08-25 DIAGNOSIS — F41.0 PANIC ATTACKS: ICD-10-CM

## 2023-08-25 DIAGNOSIS — F41.9 ANXIETY: ICD-10-CM

## 2023-08-25 DIAGNOSIS — R11.0 NAUSEA: ICD-10-CM

## 2023-08-25 DIAGNOSIS — F33.2 SEVERE RECURRENT MAJOR DEPRESSION WITHOUT PSYCHOTIC FEATURES (HCC): Primary | ICD-10-CM

## 2023-08-25 PROCEDURE — 99214 OFFICE O/P EST MOD 30 MIN: CPT | Performed by: FAMILY MEDICINE

## 2023-08-25 RX ORDER — SERTRALINE HYDROCHLORIDE 100 MG/1
100 TABLET, FILM COATED ORAL DAILY
Qty: 90 TABLET | Refills: 0 | Status: SHIPPED | OUTPATIENT
Start: 2023-08-25

## 2023-08-25 RX ORDER — PANTOPRAZOLE SODIUM 40 MG/1
40 TABLET, DELAYED RELEASE ORAL
Qty: 90 TABLET | Refills: 0 | Status: SHIPPED | OUTPATIENT
Start: 2023-08-25

## 2023-08-25 SDOH — ECONOMIC STABILITY: INCOME INSECURITY: HOW HARD IS IT FOR YOU TO PAY FOR THE VERY BASICS LIKE FOOD, HOUSING, MEDICAL CARE, AND HEATING?: NOT HARD AT ALL

## 2023-08-25 SDOH — ECONOMIC STABILITY: FOOD INSECURITY: WITHIN THE PAST 12 MONTHS, THE FOOD YOU BOUGHT JUST DIDN'T LAST AND YOU DIDN'T HAVE MONEY TO GET MORE.: NEVER TRUE

## 2023-08-25 SDOH — ECONOMIC STABILITY: FOOD INSECURITY: WITHIN THE PAST 12 MONTHS, YOU WORRIED THAT YOUR FOOD WOULD RUN OUT BEFORE YOU GOT MONEY TO BUY MORE.: NEVER TRUE

## 2023-08-25 ASSESSMENT — ENCOUNTER SYMPTOMS
ABDOMINAL PAIN: 0
VOMITING: 0
NAUSEA: 1
SHORTNESS OF BREATH: 0

## 2023-08-25 ASSESSMENT — PATIENT HEALTH QUESTIONNAIRE - PHQ9
SUM OF ALL RESPONSES TO PHQ QUESTIONS 1-9: 0
SUM OF ALL RESPONSES TO PHQ QUESTIONS 1-9: 0
1. LITTLE INTEREST OR PLEASURE IN DOING THINGS: 0
SUM OF ALL RESPONSES TO PHQ QUESTIONS 1-9: 0
SUM OF ALL RESPONSES TO PHQ9 QUESTIONS 1 & 2: 0
SUM OF ALL RESPONSES TO PHQ QUESTIONS 1-9: 0
2. FEELING DOWN, DEPRESSED OR HOPELESS: 0

## 2023-08-25 NOTE — PROGRESS NOTES
Kiersten Payton (:  1975) is a Established patient, presenting virtually for evaluation of the following:    Assessment & Plan   Below is the assessment and plan developed based on review of pertinent history, physical exam, labs, studies, and medications. 1. Severe recurrent major depression without psychotic features (HCC)  -     sertraline (ZOLOFT) 100 MG tablet; Take 1 tablet by mouth daily, Disp-90 tablet, R-0Normal  2. Anxiety  -     sertraline (ZOLOFT) 100 MG tablet; Take 1 tablet by mouth daily, Disp-90 tablet, R-0Normal  3. Panic attacks  -     sertraline (ZOLOFT) 100 MG tablet; Take 1 tablet by mouth daily, Disp-90 tablet, R-0Normal  4. Gastroesophageal reflux disease, unspecified whether esophagitis present  -     pantoprazole (PROTONIX) 40 MG tablet; Take 1 tablet by mouth every morning (before breakfast), Disp-90 tablet, R-0Normal  5. Nausea  -     HCG Qualitative, Serum; Future    Depression, anxiety and panic attacks all doing well  Nausea likely due to gastroesophageal reflux disease  Continue current plans. Refills per orders  Start Protonix  Check serum BHCG    Return in about 2 months (around 10/25/2023) for Depression, Anxiety, panic attacks, GERD. Subjective   Patient presents with: Anxiety: Follow up. Medication Refill    She also needs to follow up on her depression and panic attacks. Her symptoms are 99% gone. She had a mild panic attack about a week ago. Review of Systems   Constitutional:  Positive for appetite change. Negative for activity change, fatigue and unexpected weight change. Her appetite has decreeased   Respiratory:  Negative for shortness of breath. Cardiovascular:  Negative for chest pain and palpitations. Gastrointestinal:  Positive for nausea. Negative for abdominal pain and vomiting. She has been nauseated for the past 3 weeks. Her pregnancy test was negative. mild gastroesophageal reflux disease symptoms.    Psychiatric/Behavioral:

## 2023-08-25 NOTE — PROGRESS NOTES
Chief Complaint   Patient presents with    Anxiety     Follow up. Medication Refill     1. Have you been to the ER, urgent care clinic since your last visit? Hospitalized since your last visit? No    2. Have you seen or consulted any other health care providers outside of the [address] [place] [address] Avenue since your last visit? Include any pap smears or colon screening.  No

## 2023-08-27 RX ORDER — SERTRALINE HYDROCHLORIDE 100 MG/1
100 TABLET, FILM COATED ORAL DAILY
Qty: 30 TABLET | Refills: 0 | OUTPATIENT
Start: 2023-08-27

## 2023-08-28 RX ORDER — HYALURONATE SOD, CROSS-LINKED 30 MG/3 ML
SYRINGE (ML) INTRAARTICULAR
Qty: 7.5 ML | Refills: 0 | OUTPATIENT
Start: 2023-08-28

## 2023-08-28 NOTE — TELEPHONE ENCOUNTER
Spoke with patient she will call back in a day or two when she can look at her schedule and come in for re-evaluation.

## 2023-08-31 ENCOUNTER — TELEPHONE (OUTPATIENT)
Facility: CLINIC | Age: 48
End: 2023-08-31

## 2023-08-31 NOTE — TELEPHONE ENCOUNTER
Please verify that we have permission to talk to her. The patient is being treated with antidepressants.

## 2023-09-07 DIAGNOSIS — R11.0 NAUSEA: ICD-10-CM

## 2023-09-08 LAB — HCG SERPL QL: NEGATIVE

## 2023-09-19 ENCOUNTER — TELEPHONE (OUTPATIENT)
Facility: CLINIC | Age: 48
End: 2023-09-19

## 2023-09-19 NOTE — TELEPHONE ENCOUNTER
Pt dropped off folder with return to work forms. It is in Dr Harvey conrad, not sure if an appointment was needed. Let me know if so and I will call patient and make appointment.

## 2023-09-21 NOTE — TELEPHONE ENCOUNTER
Patient was seen virtually on 08/25/23 and advised if the paperwork is not signed by 09/21/23 Friday that she can work part time she will be terminated from her job.

## 2023-09-27 NOTE — TELEPHONE ENCOUNTER
Pt called to clarify why she needs to be seen again for paperwork to be completed for her to work 3 days a week when she was told by Dr. Arabella Gomez at her last visit to follow up around 10/25/23. Pt requesting call back at [phone].  Mike

## 2024-10-17 NOTE — TELEPHONE ENCOUNTER
Called and spoke with pt, and she has been advised and states understanding of recommendation. Pt declined both options. Message sent via [employer]. Awaiting response.

## 2024-11-13 ENCOUNTER — TELEPHONE (OUTPATIENT)
Age: 49
End: 2024-11-13

## 2024-11-13 NOTE — TELEPHONE ENCOUNTER
Patient called in having received an alternative injection to visco supplementation on 06/01/22 from Dr. Segura for a complex tear of medical meniscus right knee injury. She is wanting to move forward with receiving this alternative injection of platelet rich plasma. Patient would like a call back to learn if this service is still available and what the next steps would be to move forward with scheduling the procedure at the new office location of [facility]. The patient may be reached at [phone].

## 2024-11-25 ENCOUNTER — OFFICE VISIT (OUTPATIENT)
Age: 49
End: 2024-11-25

## 2024-11-25 ENCOUNTER — TELEPHONE (OUTPATIENT)
Age: 49
End: 2024-11-25

## 2024-11-25 VITALS — WEIGHT: 293 LBS | HEIGHT: 69 IN | BODY MASS INDEX: 43.4 KG/M2

## 2024-11-25 DIAGNOSIS — M17.11 UNILATERAL PRIMARY OSTEOARTHRITIS, RIGHT KNEE: Primary | ICD-10-CM

## 2024-11-25 PROCEDURE — 99213 OFFICE O/P EST LOW 20 MIN: CPT | Performed by: ORTHOPAEDIC SURGERY

## 2024-11-25 RX ORDER — TIRZEPATIDE 5 MG/.5ML
INJECTION, SOLUTION SUBCUTANEOUS
COMMUNITY
Start: 2024-10-15

## 2024-11-25 RX ORDER — ARIPIPRAZOLE 2 MG/1
TABLET ORAL
COMMUNITY
Start: 2024-10-15

## 2024-11-25 ASSESSMENT — PATIENT HEALTH QUESTIONNAIRE - PHQ9
SUM OF ALL RESPONSES TO PHQ QUESTIONS 1-9: 6
10. IF YOU CHECKED OFF ANY PROBLEMS, HOW DIFFICULT HAVE THESE PROBLEMS MADE IT FOR YOU TO DO YOUR WORK, TAKE CARE OF THINGS AT HOME, OR GET ALONG WITH OTHER PEOPLE: NOT DIFFICULT AT ALL
4. FEELING TIRED OR HAVING LITTLE ENERGY: NEARLY EVERY DAY
2. FEELING DOWN, DEPRESSED OR HOPELESS: SEVERAL DAYS
SUM OF ALL RESPONSES TO PHQ QUESTIONS 1-9: 6
1. LITTLE INTEREST OR PLEASURE IN DOING THINGS: SEVERAL DAYS
SUM OF ALL RESPONSES TO PHQ QUESTIONS 1-9: 6
9. THOUGHTS THAT YOU WOULD BE BETTER OFF DEAD, OR OF HURTING YOURSELF: NOT AT ALL
3. TROUBLE FALLING OR STAYING ASLEEP: SEVERAL DAYS
SUM OF ALL RESPONSES TO PHQ9 QUESTIONS 1 & 2: 2
6. FEELING BAD ABOUT YOURSELF - OR THAT YOU ARE A FAILURE OR HAVE LET YOURSELF OR YOUR FAMILY DOWN: NOT AT ALL
SUM OF ALL RESPONSES TO PHQ QUESTIONS 1-9: 6
8. MOVING OR SPEAKING SO SLOWLY THAT OTHER PEOPLE COULD HAVE NOTICED. OR THE OPPOSITE, BEING SO FIGETY OR RESTLESS THAT YOU HAVE BEEN MOVING AROUND A LOT MORE THAN USUAL: NOT AT ALL
5. POOR APPETITE OR OVEREATING: NOT AT ALL
7. TROUBLE CONCENTRATING ON THINGS, SUCH AS READING THE NEWSPAPER OR WATCHING TELEVISION: NOT AT ALL

## 2024-11-25 NOTE — PROGRESS NOTES
Identified pt with two pt identifiers (name and ). Reviewed chart in preparation for visit and have obtained necessary documentation.    Pastora Milan is a 49 y.o. female  Chief Complaint   Patient presents with    Knee Pain      Rt knee alternative injection of platelet rich plasma       Ht 1.753 m (5' 9\")   Wt (!) 158.3 kg (349 lb)   BMI 51.54 kg/m²     1. Have you been to the ER, urgent care clinic since your last visit?  Hospitalized since your last visit?no    2. Have you seen or consulted any other health care providers outside of the [facility] since your last visit?  Include any pap smears or colon screening. no  
Bjorn has a reminder for a \"due or due soon\" health maintenance. I have asked that she contact her primary care provider for follow-up on this health maintenance.

## 2024-11-25 NOTE — TELEPHONE ENCOUNTER
Identified pt with two pt identifiers (name and ). Reviewed chart in preparation for visit and have obtained necessary information. Called pt to inform her that her insurance card is needed to start prior authorization for visco supplementation. Pt stated that she can't get to it at the moment. I also sent a message to her [employer] asking her to upload a front and back picture of her insurance card (awaiting response).

## 2024-11-29 ENCOUNTER — TELEPHONE (OUTPATIENT)
Age: 49
End: 2024-11-29

## 2024-11-29 NOTE — TELEPHONE ENCOUNTER
Bakari called regarding Patient need for Authorization for scheduled injections. Bakari needs additional medical records that state the medication patient has had for pain as well as treatment . Fax to [phone] phone [phone] extension 26

## 2024-12-01 NOTE — TELEPHONE ENCOUNTER
What kind of paperwork? [place]    2. Continuous leave or intermittent? Continuous leave from 1/9/23-1/29/23    3. Is patient aware of $25.00 charge to be collected prior to papers being sent out? Yes    4. Is patient willing to pay said charge? Yes    5. Is patient aware of the 14 business day turn around for ALL paperwork? Yes    6. Is a release signed and on file? Yes    7. Has the patient completed their portion of the paperwork?   Yes PROVIDER:[TOKEN:[[id number]]]

## 2024-12-10 ENCOUNTER — OFFICE VISIT (OUTPATIENT)
Age: 49
End: 2024-12-10
Payer: COMMERCIAL

## 2024-12-10 VITALS — HEIGHT: 69 IN | WEIGHT: 293 LBS | BODY MASS INDEX: 43.4 KG/M2

## 2024-12-10 DIAGNOSIS — M17.11 UNILATERAL PRIMARY OSTEOARTHRITIS, RIGHT KNEE: Primary | ICD-10-CM

## 2024-12-10 PROCEDURE — 20610 DRAIN/INJ JOINT/BURSA W/O US: CPT | Performed by: ORTHOPAEDIC SURGERY

## 2024-12-10 RX ORDER — HYALURONATE SODIUM 10 MG/ML
20 SYRINGE (ML) INTRAARTICULAR ONCE
Status: COMPLETED | OUTPATIENT
Start: 2024-12-10 | End: 2024-12-10

## 2024-12-10 RX ADMIN — Medication 20 MG: at 09:27

## 2024-12-10 ASSESSMENT — PATIENT HEALTH QUESTIONNAIRE - PHQ9
SUM OF ALL RESPONSES TO PHQ QUESTIONS 1-9: 0
2. FEELING DOWN, DEPRESSED OR HOPELESS: NOT AT ALL
8. MOVING OR SPEAKING SO SLOWLY THAT OTHER PEOPLE COULD HAVE NOTICED. OR THE OPPOSITE, BEING SO FIGETY OR RESTLESS THAT YOU HAVE BEEN MOVING AROUND A LOT MORE THAN USUAL: NOT AT ALL
9. THOUGHTS THAT YOU WOULD BE BETTER OFF DEAD, OR OF HURTING YOURSELF: NOT AT ALL
SUM OF ALL RESPONSES TO PHQ QUESTIONS 1-9: 0
SUM OF ALL RESPONSES TO PHQ9 QUESTIONS 1 & 2: 0
SUM OF ALL RESPONSES TO PHQ QUESTIONS 1-9: 0
7. TROUBLE CONCENTRATING ON THINGS, SUCH AS READING THE NEWSPAPER OR WATCHING TELEVISION: NOT AT ALL
6. FEELING BAD ABOUT YOURSELF - OR THAT YOU ARE A FAILURE OR HAVE LET YOURSELF OR YOUR FAMILY DOWN: NOT AT ALL
4. FEELING TIRED OR HAVING LITTLE ENERGY: NOT AT ALL
3. TROUBLE FALLING OR STAYING ASLEEP: NOT AT ALL
1. LITTLE INTEREST OR PLEASURE IN DOING THINGS: NOT AT ALL
5. POOR APPETITE OR OVEREATING: NOT AT ALL
SUM OF ALL RESPONSES TO PHQ QUESTIONS 1-9: 0
10. IF YOU CHECKED OFF ANY PROBLEMS, HOW DIFFICULT HAVE THESE PROBLEMS MADE IT FOR YOU TO DO YOUR WORK, TAKE CARE OF THINGS AT HOME, OR GET ALONG WITH OTHER PEOPLE: NOT DIFFICULT AT ALL

## 2024-12-10 NOTE — PROGRESS NOTES
Date of Procedure: 12/10/2024  PROCEDURE NOTE: Right knee injection of Euflexxa    Consent was obtained from the patient. The correct site was identified after confirmation with the patient.  Following identification and confirmation of the correct site with the patient, the superolateral right knee was prepped in the normal sterile fashion.  A local anesthetic of 1% lidocaine without epinephrine was then administered to the local tissues.  Following, an injection of Euflexxa 20 mg viscosupplementation prefilled syringe was administered to the right knee.  The needle was then withdrawn and the injection site dressed with a sterile bandage at the conclusion.  The procedure was well tolerated by the patient.  No immediate adverse reactions were noted.  Post injection instructions were given.      Diagnosis: Right Knee Osteoarthritis

## 2024-12-10 NOTE — PROGRESS NOTES
Identified pt with two pt identifiers (name and ). Reviewed chart in preparation for visit and have obtained necessary documentation.    Pastora Mlian is a 49 y.o. female  Chief Complaint   Patient presents with    Injections     1st injection right knee euflexxa (auth in media)         Ht 1.753 m (5' 9\")   Wt (!) 158.3 kg (349 lb)   LMP  (LMP Unknown)   BMI 51.54 kg/m²     1. Have you been to the ER, urgent care clinic since your last visit?  Hospitalized since your last visit?no    2. Have you seen or consulted any other health care providers outside of the [facility] since your last visit?  Include any pap smears or colon screening. no

## 2024-12-16 ENCOUNTER — OFFICE VISIT (OUTPATIENT)
Age: 49
End: 2024-12-16
Payer: COMMERCIAL

## 2024-12-16 VITALS
HEIGHT: 69 IN | OXYGEN SATURATION: 96 % | HEART RATE: 88 BPM | DIASTOLIC BLOOD PRESSURE: 73 MMHG | BODY MASS INDEX: 51.51 KG/M2 | SYSTOLIC BLOOD PRESSURE: 110 MMHG

## 2024-12-16 DIAGNOSIS — M17.11 UNILATERAL PRIMARY OSTEOARTHRITIS, RIGHT KNEE: Primary | ICD-10-CM

## 2024-12-16 PROCEDURE — 20610 DRAIN/INJ JOINT/BURSA W/O US: CPT | Performed by: ORTHOPAEDIC SURGERY

## 2024-12-16 RX ORDER — HYALURONATE SODIUM 10 MG/ML
20 SYRINGE (ML) INTRAARTICULAR ONCE
Status: COMPLETED | OUTPATIENT
Start: 2024-12-16 | End: 2024-12-16

## 2024-12-16 RX ADMIN — Medication 20 MG: at 14:22

## 2024-12-16 ASSESSMENT — PATIENT HEALTH QUESTIONNAIRE - PHQ9
SUM OF ALL RESPONSES TO PHQ QUESTIONS 1-9: 0
1. LITTLE INTEREST OR PLEASURE IN DOING THINGS: NOT AT ALL
SUM OF ALL RESPONSES TO PHQ9 QUESTIONS 1 & 2: 0
SUM OF ALL RESPONSES TO PHQ QUESTIONS 1-9: 0
2. FEELING DOWN, DEPRESSED OR HOPELESS: NOT AT ALL

## 2024-12-16 NOTE — PROGRESS NOTES
Identified pt with two pt identifiers (name and ). Reviewed chart in preparation for visit and have obtained necessary documentation.    Pastora Milan is a 49 y.o. female Knee Pain (Right Knee injection )  .    Vitals:    24 1021   BP: 110/73   Site: Left Upper Arm   Position: Sitting   Cuff Size: Large Adult   Pulse: 88   SpO2: 96%   Height: 1.753 m (5' 9.02\")          1. Have you been to the ER, urgent care clinic since your last visit?  Hospitalized since your last visit?  no     2. Have you seen or consulted any other health care providers outside of the [facility] since your last visit?  Include any pap smears or colon screening.  no

## 2024-12-16 NOTE — PROGRESS NOTES
Date of Procedure: 12/16/2024  PROCEDURE NOTE: Right knee injection of Euflexxa    Consent was obtained from the patient. The correct site was identified after confirmation with the patient.  Following identification and confirmation of the correct site with the patient, the superolateral right knee was prepped in the normal sterile fashion.  A local anesthetic of 1% lidocaine without epinephrine was then administered to the local tissues.  Following, an injection of Euflexxa 20 mg viscosupplementation prefilled syringe was administered to the right knee.  The needle was then withdrawn and the injection site dressed with a sterile bandage at the conclusion.  The procedure was well tolerated by the patient.  No immediate adverse reactions were noted.  Post injection instructions were given.      Diagnosis: Right Knee Osteoarthritis

## 2024-12-23 ENCOUNTER — OFFICE VISIT (OUTPATIENT)
Age: 49
End: 2024-12-23
Payer: COMMERCIAL

## 2024-12-23 DIAGNOSIS — M17.11 UNILATERAL PRIMARY OSTEOARTHRITIS, RIGHT KNEE: Primary | ICD-10-CM

## 2024-12-23 PROCEDURE — 20610 DRAIN/INJ JOINT/BURSA W/O US: CPT | Performed by: ORTHOPAEDIC SURGERY

## 2024-12-23 RX ORDER — HYALURONATE SODIUM 10 MG/ML
20 SYRINGE (ML) INTRAARTICULAR ONCE
Status: COMPLETED | OUTPATIENT
Start: 2024-12-23 | End: 2024-12-23

## 2024-12-23 RX ADMIN — Medication 20 MG: at 11:20

## 2025-07-21 ENCOUNTER — OFFICE VISIT (OUTPATIENT)
Age: 50
End: 2025-07-21
Payer: COMMERCIAL

## 2025-07-21 DIAGNOSIS — M79.642 LEFT HAND PAIN: Primary | ICD-10-CM

## 2025-07-21 DIAGNOSIS — S62.307A CLOSED FRACTURE OF FIFTH METACARPAL BONE OF LEFT HAND, INITIAL ENCOUNTER: ICD-10-CM

## 2025-07-21 PROCEDURE — 99213 OFFICE O/P EST LOW 20 MIN: CPT | Performed by: ORTHOPAEDIC SURGERY

## 2025-07-21 ASSESSMENT — PATIENT HEALTH QUESTIONNAIRE - PHQ9
SUM OF ALL RESPONSES TO PHQ QUESTIONS 1-9: 0
SUM OF ALL RESPONSES TO PHQ QUESTIONS 1-9: 0
1. LITTLE INTEREST OR PLEASURE IN DOING THINGS: NOT AT ALL
SUM OF ALL RESPONSES TO PHQ QUESTIONS 1-9: 0
2. FEELING DOWN, DEPRESSED OR HOPELESS: NOT AT ALL
SUM OF ALL RESPONSES TO PHQ QUESTIONS 1-9: 0

## 2025-07-21 NOTE — PROGRESS NOTES
Identified pt with two pt identifiers (name and ). Reviewed chart in preparation for visit and have obtained necessary documentation.    Pastora Milan is a 50 y.o. female Pain (Left Hand )  .    There were no vitals filed for this visit.       1. Have you been to the ER, urgent care clinic since your last visit?  Hospitalized since your last visit?  yes - ER [facility]     2. Have you seen or consulted any other health care providers outside of the [facility] since your last visit?  Include any pap smears or colon screening.  yes - [facility]
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
## 2025-07-22 ENCOUNTER — HOSPITAL ENCOUNTER (EMERGENCY)
Facility: HOSPITAL | Age: 50
Discharge: HOME | End: 2025-07-23
Attending: STUDENT IN AN ORGANIZED HEALTH CARE EDUCATION/TRAINING PROGRAM
Payer: COMMERCIAL

## 2025-07-22 DIAGNOSIS — I10 HYPERTENSION, UNSPECIFIED TYPE: ICD-10-CM

## 2025-07-22 DIAGNOSIS — T78.40XA ALLERGIC REACTION, INITIAL ENCOUNTER: Primary | ICD-10-CM

## 2025-07-22 DIAGNOSIS — E11.65 TYPE 2 DIABETES MELLITUS WITH HYPERGLYCEMIA, WITHOUT LONG-TERM CURRENT USE OF INSULIN (HCC): ICD-10-CM

## 2025-07-22 LAB
COMMENT:: NORMAL
SPECIMEN HOLD: NORMAL

## 2025-07-22 PROCEDURE — 2580000003 HC RX 258: Performed by: STUDENT IN AN ORGANIZED HEALTH CARE EDUCATION/TRAINING PROGRAM

## 2025-07-22 PROCEDURE — 85025 COMPLETE CBC W/AUTO DIFF WBC: CPT

## 2025-07-22 PROCEDURE — 96374 THER/PROPH/DIAG INJ IV PUSH: CPT

## 2025-07-22 PROCEDURE — 99284 EMERGENCY DEPT VISIT MOD MDM: CPT

## 2025-07-22 PROCEDURE — 6360000002 HC RX W HCPCS: Performed by: STUDENT IN AN ORGANIZED HEALTH CARE EDUCATION/TRAINING PROGRAM

## 2025-07-22 PROCEDURE — 2500000003 HC RX 250 WO HCPCS: Performed by: STUDENT IN AN ORGANIZED HEALTH CARE EDUCATION/TRAINING PROGRAM

## 2025-07-22 PROCEDURE — 80053 COMPREHEN METABOLIC PANEL: CPT

## 2025-07-22 PROCEDURE — 96375 TX/PRO/DX INJ NEW DRUG ADDON: CPT

## 2025-07-22 PROCEDURE — 36415 COLL VENOUS BLD VENIPUNCTURE: CPT

## 2025-07-22 PROCEDURE — 96361 HYDRATE IV INFUSION ADD-ON: CPT

## 2025-07-22 RX ORDER — 0.9 % SODIUM CHLORIDE 0.9 %
1000 INTRAVENOUS SOLUTION INTRAVENOUS ONCE
Status: COMPLETED | OUTPATIENT
Start: 2025-07-22 | End: 2025-07-23

## 2025-07-22 RX ORDER — ONDANSETRON 2 MG/ML
4 INJECTION INTRAMUSCULAR; INTRAVENOUS ONCE
Status: COMPLETED | OUTPATIENT
Start: 2025-07-22 | End: 2025-07-22

## 2025-07-22 RX ADMIN — ONDANSETRON 4 MG: 2 INJECTION, SOLUTION INTRAMUSCULAR; INTRAVENOUS at 23:19

## 2025-07-22 RX ADMIN — SODIUM CHLORIDE 1000 ML: 0.9 INJECTION, SOLUTION INTRAVENOUS at 23:20

## 2025-07-22 RX ADMIN — FAMOTIDINE 20 MG: 10 INJECTION, SOLUTION INTRAVENOUS at 23:19

## 2025-07-23 VITALS
RESPIRATION RATE: 19 BRPM | HEART RATE: 87 BPM | OXYGEN SATURATION: 90 % | SYSTOLIC BLOOD PRESSURE: 119 MMHG | TEMPERATURE: 98 F | DIASTOLIC BLOOD PRESSURE: 81 MMHG

## 2025-07-23 LAB
ALBUMIN SERPL-MCNC: 3.8 G/DL (ref 3.5–5)
ALBUMIN/GLOB SERPL: 1 (ref 1.1–2.2)
ALP SERPL-CCNC: 118 U/L (ref 45–117)
ALT SERPL-CCNC: 33 U/L (ref 12–78)
ANION GAP SERPL CALC-SCNC: 7 MMOL/L (ref 2–12)
AST SERPL-CCNC: 17 U/L (ref 15–37)
BASOPHILS # BLD: 0.04 K/UL (ref 0–0.1)
BASOPHILS NFR BLD: 0.3 % (ref 0–1)
BILIRUB SERPL-MCNC: 0.5 MG/DL (ref 0.2–1)
BUN SERPL-MCNC: 17 MG/DL (ref 6–20)
BUN/CREAT SERPL: 17 (ref 12–20)
CALCIUM SERPL-MCNC: 9.2 MG/DL (ref 8.5–10.1)
CHLORIDE SERPL-SCNC: 102 MMOL/L (ref 97–108)
CO2 SERPL-SCNC: 25 MMOL/L (ref 21–32)
CREAT SERPL-MCNC: 0.99 MG/DL (ref 0.55–1.02)
DIFFERENTIAL METHOD BLD: ABNORMAL
EOSINOPHIL # BLD: 0.2 K/UL (ref 0–0.4)
EOSINOPHIL NFR BLD: 1.7 % (ref 0–7)
ERYTHROCYTE [DISTWIDTH] IN BLOOD BY AUTOMATED COUNT: 13.2 % (ref 11.5–14.5)
GLOBULIN SER CALC-MCNC: 3.9 G/DL (ref 2–4)
GLUCOSE SERPL-MCNC: 278 MG/DL (ref 65–100)
HCT VFR BLD AUTO: 47.2 % (ref 35–47)
HGB BLD-MCNC: 15.2 G/DL (ref 11.5–16)
IMM GRANULOCYTES # BLD AUTO: 0.04 K/UL (ref 0–0.04)
IMM GRANULOCYTES NFR BLD AUTO: 0.3 % (ref 0–0.5)
LYMPHOCYTES # BLD: 3.23 K/UL (ref 0.8–3.5)
LYMPHOCYTES NFR BLD: 28.3 % (ref 12–49)
MCH RBC QN AUTO: 27.8 PG (ref 26–34)
MCHC RBC AUTO-ENTMCNC: 32.2 G/DL (ref 30–36.5)
MCV RBC AUTO: 86.4 FL (ref 80–99)
MONOCYTES # BLD: 0.61 K/UL (ref 0–1)
MONOCYTES NFR BLD: 5.3 % (ref 5–13)
NEUTS SEG # BLD: 7.31 K/UL (ref 1.8–8)
NEUTS SEG NFR BLD: 64.1 % (ref 32–75)
NRBC # BLD: 0 K/UL (ref 0–0.01)
NRBC BLD-RTO: 0 PER 100 WBC
PLATELET # BLD AUTO: 238 K/UL (ref 150–400)
PMV BLD AUTO: 11.5 FL (ref 8.9–12.9)
POTASSIUM SERPL-SCNC: 3.7 MMOL/L (ref 3.5–5.1)
PROT SERPL-MCNC: 7.7 G/DL (ref 6.4–8.2)
RBC # BLD AUTO: 5.46 M/UL (ref 3.8–5.2)
SODIUM SERPL-SCNC: 134 MMOL/L (ref 136–145)
WBC # BLD AUTO: 11.4 K/UL (ref 3.6–11)

## 2025-07-23 PROCEDURE — 96361 HYDRATE IV INFUSION ADD-ON: CPT

## 2025-07-23 RX ORDER — LORATADINE 10 MG/1
10 TABLET ORAL DAILY
Qty: 7 TABLET | Refills: 0 | Status: SHIPPED | OUTPATIENT
Start: 2025-07-23 | End: 2025-07-30

## 2025-07-23 NOTE — ED NOTES
Patient discharged from the ED by [facility]. Diagnosis, medications, precautions and follow-ups were reviewed with the patient/family. Questions were asked and answered prior to departure. Patient departed the ED via walk-out and was accompanied by friend.

## 2025-07-23 NOTE — DISCHARGE INSTRUCTIONS
Please make a followup with your primary care physician and an allergist.  If you have any new or worsening concerning medical symptoms please return to the emergency department.

## 2025-07-24 NOTE — ED PROVIDER NOTES
[facility] EMERGENCY DEPARTMENT  EMERGENCY DEPARTMENT ENCOUNTER       Pt Name: Pastora Milan  MRN: [id number]  Birthdate 1975  Date of evaluation: 7/22/2025  Provider: Ridge Solares MD   PCP: Kasie Parr APRN - NP  Note Started: 3:27 AM EDT 7/24/25     CHIEF COMPLAINT       Chief Complaint   Patient presents with    Allergic Reaction     Pt arrives w sudden onset hives, vomiting x 5 times, abd pain. Pt has welts all over arms and legs, pt reports she took 3 benadryl at 2220. Pt reports some tongue swelling. Unsure of allergen. Some SOB noted, pt reports its likely from her anxiety.      HISTORY OF PRESENT ILLNESS: 1 or more elements      History From: patient, History limited by: none     Pastora Milan is a 50 y.o. female who presents to the emergency department 1 hour after the onset of nausea/vomiting and welts over bilateral upper extremities and throughout back.  She does have facial flushing.  She feels as if her tongue is slightly swollen.  She had no known allergic exposures.  No abnormal PO intake that she is aware of.  She took 100mg PO benadryl prior to arrival.    Please See MDM for Additional Details of the HPI/PMH  Nursing Notes were all reviewed and agreed with or any disagreements were addressed in the HPI.     REVIEW OF SYSTEMS      Positives and Pertinent negatives as per HPI.    PAST HISTORY     Past Medical History:  Past Medical History:   Diagnosis Date    Abnormal Pap smear of cervix 07/16/2015 9/28/16 Normal cytology ; Positive HPV and 16 ; Negative 18 --> Colpo     Abnormal Pap smear of cervix 01/11/2018    ASCUS ; HPV Negative -> Colpo ; Pt declined ; Rpt Pap in 1yr     Anemia     Anxiety     Cervical dysplasia 02/2017    ROLF 3 on LEEP -> margins neg    Chronic pain     CTS (carpal tunnel syndrome)     Depression 11/05/2014    Diabetes (HCC)     Diverticulitis 10/2017    Encounter for screening mammogram for breast cancer     Negatative/No mammographic evidence of

## 2025-07-28 ENCOUNTER — OFFICE VISIT (OUTPATIENT)
Age: 50
End: 2025-07-28
Payer: COMMERCIAL

## 2025-07-28 DIAGNOSIS — S62.307A CLOSED FRACTURE OF FIFTH METACARPAL BONE OF LEFT HAND, INITIAL ENCOUNTER: Primary | ICD-10-CM

## 2025-07-28 PROCEDURE — 99213 OFFICE O/P EST LOW 20 MIN: CPT | Performed by: ORTHOPAEDIC SURGERY

## 2025-07-28 ASSESSMENT — PATIENT HEALTH QUESTIONNAIRE - PHQ9
1. LITTLE INTEREST OR PLEASURE IN DOING THINGS: NOT AT ALL
SUM OF ALL RESPONSES TO PHQ QUESTIONS 1-9: 0
2. FEELING DOWN, DEPRESSED OR HOPELESS: NOT AT ALL
SUM OF ALL RESPONSES TO PHQ QUESTIONS 1-9: 0

## 2025-07-28 NOTE — PROGRESS NOTES
Identified pt with two pt identifiers (name and ). Reviewed chart in preparation for visit and have obtained necessary documentation.    Pastora Milan is a 50 y.o. female Follow-up (Left hand )  .    There were no vitals filed for this visit.       1. Have you been to the ER, urgent care clinic since your last visit?  Hospitalized since your last visit?  yes - ER     2. Have you seen or consulted any other health care providers outside of the [facility] since your last visit?  Include any pap smears or colon screening.  no
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
## 2025-08-06 ENCOUNTER — OFFICE VISIT (OUTPATIENT)
Age: 50
End: 2025-08-06
Payer: COMMERCIAL

## 2025-08-06 DIAGNOSIS — S62.307A CLOSED FRACTURE OF FIFTH METACARPAL BONE OF LEFT HAND, INITIAL ENCOUNTER: Primary | ICD-10-CM

## 2025-08-06 DIAGNOSIS — M79.642 LEFT HAND PAIN: ICD-10-CM

## 2025-08-06 PROCEDURE — 99213 OFFICE O/P EST LOW 20 MIN: CPT | Performed by: ORTHOPAEDIC SURGERY

## 2025-08-06 ASSESSMENT — PATIENT HEALTH QUESTIONNAIRE - PHQ9
1. LITTLE INTEREST OR PLEASURE IN DOING THINGS: NOT AT ALL
2. FEELING DOWN, DEPRESSED OR HOPELESS: NOT AT ALL
SUM OF ALL RESPONSES TO PHQ QUESTIONS 1-9: 0

## 2025-08-26 ENCOUNTER — OFFICE VISIT (OUTPATIENT)
Age: 50
End: 2025-08-26
Payer: COMMERCIAL

## 2025-08-26 DIAGNOSIS — S62.307A CLOSED FRACTURE OF FIFTH METACARPAL BONE OF LEFT HAND, INITIAL ENCOUNTER: Primary | ICD-10-CM

## 2025-08-26 PROCEDURE — 99213 OFFICE O/P EST LOW 20 MIN: CPT | Performed by: ORTHOPAEDIC SURGERY

## 2025-08-26 ASSESSMENT — PATIENT HEALTH QUESTIONNAIRE - PHQ9
SUM OF ALL RESPONSES TO PHQ QUESTIONS 1-9: 0
1. LITTLE INTEREST OR PLEASURE IN DOING THINGS: NOT AT ALL
SUM OF ALL RESPONSES TO PHQ QUESTIONS 1-9: 0
SUM OF ALL RESPONSES TO PHQ QUESTIONS 1-9: 0
2. FEELING DOWN, DEPRESSED OR HOPELESS: NOT AT ALL
SUM OF ALL RESPONSES TO PHQ QUESTIONS 1-9: 0

## (undated) DEVICE — TRAY PREP DRY W/ PREM GLV 2 APPL 6 SPNG 2 UNDPD 1 OVERWRAP

## (undated) DEVICE — DRAPE,REIN 53X77,STERILE: Brand: MEDLINE

## (undated) DEVICE — BASIC SINGLE BASIN BTC-LF: Brand: MEDLINE INDUSTRIES, INC.

## (undated) DEVICE — SUT ETHLN 3-0 18IN PS2 BLK --

## (undated) DEVICE — KIT INFECTION CTRL ST FRAN --

## (undated) DEVICE — BRUSH SCRB 4% CHG RED DISP --

## (undated) DEVICE — SYRINGE MED 3ML CLR PLAS STD N CTRL LUERLOCK TIP DISP

## (undated) DEVICE — GOWN,SIRUS,NONRNF,XLN/2XL,18/CS: Brand: MEDLINE

## (undated) DEVICE — DRESSING,GAUZE,XEROFORM,CURAD,5"X9",ST: Brand: CURAD

## (undated) DEVICE — MEDI-VAC YANK SUCT HNDL W/TPRD BULBOUS TIP: Brand: CARDINAL HEALTH

## (undated) DEVICE — BNDG COMPR ESMRCH 6INX9FT LF --

## (undated) DEVICE — DRAPE,U/ SHT,SPLIT,PLAS,STERIL: Brand: MEDLINE

## (undated) DEVICE — SOLUTION IRRIG 500ML 0.9% SOD CHLO USP POUR PLAS BTL

## (undated) DEVICE — SOLUTION SURG PREP 26 CC PURPREP

## (undated) DEVICE — LLETZ LOOP ELECTRODE, 10MM WIDE X 10MM DEEP, 11.8 CM SHAFT, YELLOW: Brand: MEGADYNE

## (undated) DEVICE — PERI/GYN PACK: Brand: CONVERTORS

## (undated) DEVICE — KNEE ARTHROSCOPY-MRMCASU: Brand: MEDLINE INDUSTRIES, INC.

## (undated) DEVICE — SUPER TURBOVAC 90 INTEGRATED CABLE WAND ICW: Brand: COBLATION

## (undated) DEVICE — MEDI-VAC NON-CONDUCTIVE SUCTION TUBING: Brand: CARDINAL HEALTH

## (undated) DEVICE — SKIN MARKER,REGULAR TIP WITH RULER AND LABELS: Brand: DEVON

## (undated) DEVICE — BANDAGE,GAUZE,CONFORMING,3"X75",STRL,LF: Brand: MEDLINE

## (undated) DEVICE — ZIMMER® STERILE DISPOSABLE TOURNIQUET CUFF WITH PROTECTIVE SLEEVE AND PLC, DUAL PORT, SINGLE BLADDER, 18 IN. (46 CM)

## (undated) DEVICE — SUT SLK 0 30IN SH BLK --

## (undated) DEVICE — ZIMMER® STERILE DISPOSABLE TOURNIQUET CUFF WITH PROTECTIVE SLEEVE AND PLC, DUAL PORT, SINGLE BLADDER, 34 IN. (86 CM)

## (undated) DEVICE — DRESSING,GAUZE,XEROFORM,CURAD,1"X8",ST: Brand: CURAD

## (undated) DEVICE — TUBING SUCT 12FR MAL ALUM SHFT FN CAP VENT UNIV CONN W/ OBT

## (undated) DEVICE — GOWN,SIRUS,NONRNF,SETINSLV,2XL,18/CS: Brand: MEDLINE

## (undated) DEVICE — STERILE POLYISOPRENE POWDER-FREE SURGICAL GLOVES: Brand: PROTEXIS

## (undated) DEVICE — SUTURE VCRL SZ 3-0 L18IN ABSRB UD PS-2 L19MM 3/8 CRV PRIM J497H

## (undated) DEVICE — GLOVE SURG SZ 85 L12IN FNGR THK79MIL GRN LTX FREE

## (undated) DEVICE — X-RAY SPONGES,16 PLY: Brand: DERMACEA

## (undated) DEVICE — EXTREMITY-MRMC: Brand: MEDLINE INDUSTRIES, INC.

## (undated) DEVICE — TRANSFER SET 3": Brand: MEDLINE INDUSTRIES, INC.

## (undated) DEVICE — WRAP COHESIVE W2INXL5YD TAN SELF ADH BNDG HND NON STERILE TEAR CARING

## (undated) DEVICE — GLOVE SURG SZ 85 L12IN FNGR ORTHO 126MIL CRM LTX FREE

## (undated) DEVICE — STRAP RESTRAIN W3.5XL19IN TECLIN STRRP POS LEG DURING LITH

## (undated) DEVICE — APPLICATOR MEDICATED 10.5 CC SOLUTION HI LT ORNG CHLORAPREP

## (undated) DEVICE — LLETZ LOOP ELECTRODE, 20MM WIDE X 10MM DEEP, 11.8 CM SHAFT, RED: Brand: MEGADYNE

## (undated) DEVICE — REM POLYHESIVE ADULT PATIENT RETURN ELECTRODE: Brand: VALLEYLAB

## (undated) DEVICE — ELECTRODE ES L11CM DIA5MM BALL SHFT RED DISP UTAHLOOP

## (undated) DEVICE — HYPODERMIC SAFETY NEEDLE: Brand: MAGELLAN

## (undated) DEVICE — TELFA NON-ADHERENT ABSORBENT DRESSING: Brand: TELFA

## (undated) DEVICE — DYONICS 25 INFLOW TUBE SET, 3 PER BOX

## (undated) DEVICE — SOLUTION IRRIG 3000ML LAC RINGERS ARTHROMTC PLAS CONT

## (undated) DEVICE — PREP KIT PEEL PTCH POVIDONE IOD

## (undated) DEVICE — NEEDLE SPNL 20GA L3.5IN YEL HUB S STL REG WALL FIT STYL W/

## (undated) DEVICE — BIPOLAR FORCEPS CORD: Brand: VALLEYLAB

## (undated) DEVICE — COVER LT HNDL BLU PLAS

## (undated) DEVICE — SUTURE ETHLN SZ 3-0 L18IN NONABSORBABLE BLK PS-2 L19MM 3/8 1669H

## (undated) DEVICE — PENCIL ES L3M BTTN SWCH S STL HEX LOK BLDE ELECTRD HOLSTER